# Patient Record
Sex: MALE | Race: WHITE | Employment: OTHER | URBAN - METROPOLITAN AREA
[De-identification: names, ages, dates, MRNs, and addresses within clinical notes are randomized per-mention and may not be internally consistent; named-entity substitution may affect disease eponyms.]

---

## 2017-01-28 DIAGNOSIS — G47.00 INSOMNIA, UNSPECIFIED TYPE: ICD-10-CM

## 2017-01-30 RX ORDER — TRAZODONE HYDROCHLORIDE 50 MG/1
TABLET ORAL
Qty: 30 TABLET | Refills: 0 | Status: SHIPPED | OUTPATIENT
Start: 2017-01-30 | End: 2017-03-06 | Stop reason: SDUPTHER

## 2017-01-31 DIAGNOSIS — F32.A DEPRESSION: ICD-10-CM

## 2017-01-31 DIAGNOSIS — R94.39 ABNORMAL STRESS ECG: ICD-10-CM

## 2017-01-31 RX ORDER — ASPIRIN 81 MG/1
TABLET ORAL
Qty: 30 TABLET | Refills: 3 | Status: SHIPPED | OUTPATIENT
Start: 2017-01-31 | End: 2017-09-18 | Stop reason: SDUPTHER

## 2017-01-31 RX ORDER — FLUOXETINE HYDROCHLORIDE 20 MG/1
CAPSULE ORAL
Qty: 30 CAPSULE | Refills: 3 | Status: SHIPPED | OUTPATIENT
Start: 2017-01-31 | End: 2017-09-18 | Stop reason: SDUPTHER

## 2017-03-16 DIAGNOSIS — E11.9 DIABETES MELLITUS TYPE 2 IN NONOBESE (HCC): ICD-10-CM

## 2017-07-06 ENCOUNTER — TELEPHONE (OUTPATIENT)
Dept: FAMILY MEDICINE CLINIC | Age: 57
End: 2017-07-06

## 2017-07-07 ENCOUNTER — TELEPHONE (OUTPATIENT)
Dept: FAMILY MEDICINE CLINIC | Age: 57
End: 2017-07-07

## 2017-09-18 ENCOUNTER — OFFICE VISIT (OUTPATIENT)
Dept: FAMILY MEDICINE CLINIC | Age: 57
End: 2017-09-18

## 2017-09-18 VITALS
WEIGHT: 154 LBS | BODY MASS INDEX: 22.1 KG/M2 | DIASTOLIC BLOOD PRESSURE: 80 MMHG | HEART RATE: 112 BPM | SYSTOLIC BLOOD PRESSURE: 130 MMHG | OXYGEN SATURATION: 98 %

## 2017-09-18 DIAGNOSIS — E11.9 DIABETES MELLITUS TYPE 2 IN NONOBESE (HCC): ICD-10-CM

## 2017-09-18 DIAGNOSIS — G47.00 INSOMNIA, UNSPECIFIED TYPE: ICD-10-CM

## 2017-09-18 DIAGNOSIS — G25.81 RLS (RESTLESS LEGS SYNDROME): ICD-10-CM

## 2017-09-18 DIAGNOSIS — R94.39 ABNORMAL STRESS ECG: ICD-10-CM

## 2017-09-18 DIAGNOSIS — F32.A DEPRESSION, UNSPECIFIED DEPRESSION TYPE: ICD-10-CM

## 2017-09-18 DIAGNOSIS — J40 BRONCHITIS: ICD-10-CM

## 2017-09-18 LAB
A/G RATIO: 1.9 (ref 1.1–2.2)
ALBUMIN SERPL-MCNC: 4.1 G/DL (ref 3.4–5)
ALP BLD-CCNC: 127 U/L (ref 40–129)
ALT SERPL-CCNC: 22 U/L (ref 10–40)
ANION GAP SERPL CALCULATED.3IONS-SCNC: 15 MMOL/L (ref 3–16)
AST SERPL-CCNC: 21 U/L (ref 15–37)
BILIRUB SERPL-MCNC: <0.2 MG/DL (ref 0–1)
BUN BLDV-MCNC: 10 MG/DL (ref 7–20)
CALCIUM SERPL-MCNC: 9.5 MG/DL (ref 8.3–10.6)
CHLORIDE BLD-SCNC: 100 MMOL/L (ref 99–110)
CHOLESTEROL, TOTAL: 143 MG/DL (ref 0–199)
CO2: 23 MMOL/L (ref 21–32)
CREAT SERPL-MCNC: 0.7 MG/DL (ref 0.9–1.3)
CREATININE URINE: 158.5 MG/DL (ref 39–259)
GFR AFRICAN AMERICAN: >60
GFR NON-AFRICAN AMERICAN: >60
GLOBULIN: 2.2 G/DL
GLUCOSE BLD-MCNC: 116 MG/DL (ref 70–99)
HDLC SERPL-MCNC: 23 MG/DL (ref 40–60)
LDL CHOLESTEROL CALCULATED: 62 MG/DL
MICROALBUMIN UR-MCNC: <1.2 MG/DL
MICROALBUMIN/CREAT UR-RTO: NORMAL MG/G (ref 0–30)
POTASSIUM SERPL-SCNC: 3.8 MMOL/L (ref 3.5–5.1)
SODIUM BLD-SCNC: 138 MMOL/L (ref 136–145)
TOTAL PROTEIN: 6.3 G/DL (ref 6.4–8.2)
TRIGL SERPL-MCNC: 290 MG/DL (ref 0–150)
VLDLC SERPL CALC-MCNC: 58 MG/DL

## 2017-09-18 PROCEDURE — 99214 OFFICE O/P EST MOD 30 MIN: CPT | Performed by: FAMILY MEDICINE

## 2017-09-18 PROCEDURE — 36415 COLL VENOUS BLD VENIPUNCTURE: CPT | Performed by: FAMILY MEDICINE

## 2017-09-18 RX ORDER — ASPIRIN 81 MG/1
TABLET ORAL
Qty: 30 TABLET | Refills: 3 | Status: SHIPPED | OUTPATIENT
Start: 2017-09-18 | End: 2018-01-13 | Stop reason: SDUPTHER

## 2017-09-18 RX ORDER — GABAPENTIN 300 MG/1
CAPSULE ORAL
Qty: 90 CAPSULE | Refills: 3 | Status: SHIPPED | OUTPATIENT
Start: 2017-09-18 | End: 2018-01-13 | Stop reason: SDUPTHER

## 2017-09-18 RX ORDER — ALBUTEROL SULFATE 90 UG/1
2 AEROSOL, METERED RESPIRATORY (INHALATION) EVERY 6 HOURS PRN
Qty: 1 INHALER | Refills: 3 | Status: SHIPPED | OUTPATIENT
Start: 2017-09-18 | End: 2019-02-27

## 2017-09-18 RX ORDER — FLUOXETINE HYDROCHLORIDE 20 MG/1
CAPSULE ORAL
Qty: 30 CAPSULE | Refills: 3 | Status: SHIPPED | OUTPATIENT
Start: 2017-09-18 | End: 2018-01-13 | Stop reason: SDUPTHER

## 2017-09-18 RX ORDER — TRAZODONE HYDROCHLORIDE 50 MG/1
TABLET ORAL
Qty: 30 TABLET | Refills: 3 | Status: SHIPPED | OUTPATIENT
Start: 2017-09-18 | End: 2018-01-13 | Stop reason: SDUPTHER

## 2017-09-19 LAB
ESTIMATED AVERAGE GLUCOSE: 128.4 MG/DL
HBA1C MFR BLD: 6.1 %

## 2017-10-17 ENCOUNTER — TELEPHONE (OUTPATIENT)
Dept: FAMILY MEDICINE CLINIC | Age: 57
End: 2017-10-17

## 2017-10-17 NOTE — TELEPHONE ENCOUNTER
Pt faxed over a request for a new rx stating that the pt wants to switch to something other than metformin as this is causing him to have diarrhea.  Please advise

## 2017-10-24 ENCOUNTER — TELEPHONE (OUTPATIENT)
Dept: FAMILY MEDICINE CLINIC | Age: 57
End: 2017-10-24

## 2017-10-26 NOTE — TELEPHONE ENCOUNTER
Left another message for patient to call and move up his apt from 12/8/17 to an earlier date. Patient has been left many messages, but he may just be wanting to keep that apt. It's now up to the patient to call and make apt.

## 2017-10-30 ENCOUNTER — OFFICE VISIT (OUTPATIENT)
Dept: FAMILY MEDICINE CLINIC | Age: 57
End: 2017-10-30

## 2017-10-30 VITALS
OXYGEN SATURATION: 99 % | HEART RATE: 94 BPM | WEIGHT: 155 LBS | DIASTOLIC BLOOD PRESSURE: 64 MMHG | BODY MASS INDEX: 22.24 KG/M2 | SYSTOLIC BLOOD PRESSURE: 118 MMHG

## 2017-10-30 DIAGNOSIS — R06.02 SOB (SHORTNESS OF BREATH): Primary | ICD-10-CM

## 2017-10-30 DIAGNOSIS — F17.200 SMOKER: ICD-10-CM

## 2017-10-30 PROCEDURE — 3017F COLORECTAL CA SCREEN DOC REV: CPT | Performed by: FAMILY MEDICINE

## 2017-10-30 PROCEDURE — 99214 OFFICE O/P EST MOD 30 MIN: CPT | Performed by: FAMILY MEDICINE

## 2017-10-30 PROCEDURE — G8420 CALC BMI NORM PARAMETERS: HCPCS | Performed by: FAMILY MEDICINE

## 2017-10-30 PROCEDURE — G8427 DOCREV CUR MEDS BY ELIG CLIN: HCPCS | Performed by: FAMILY MEDICINE

## 2017-10-30 PROCEDURE — G8484 FLU IMMUNIZE NO ADMIN: HCPCS | Performed by: FAMILY MEDICINE

## 2017-10-30 PROCEDURE — 4004F PT TOBACCO SCREEN RCVD TLK: CPT | Performed by: FAMILY MEDICINE

## 2017-10-30 ASSESSMENT — ENCOUNTER SYMPTOMS
COUGH: 1
SHORTNESS OF BREATH: 1

## 2017-10-30 NOTE — PROGRESS NOTES
(shortness of breath)    - FULL PFT STUDY WITH PRE AND POST; Future  - umeclidinium-vilanterol (ANORO ELLIPTA) 62.5-25 MCG/INH AEPB inhaler; Inhale 1 puff into the lungs daily  Dispense: 1 each; Refill: 5    2. Smoker    - FULL PFT STUDY WITH PRE AND POST; Future  - umeclidinium-vilanterol (ANORO ELLIPTA) 62.5-25 MCG/INH AEPB inhaler; Inhale 1 puff into the lungs daily  Dispense: 1 each; Refill: 5    Still strongly suspect early COPD by the diagnosis has not been made by criteria at this time. He'll start on ANORO and get a another pulmonary function tests.

## 2017-11-21 ENCOUNTER — HOSPITAL ENCOUNTER (OUTPATIENT)
Dept: PULMONOLOGY | Age: 57
Discharge: HOME | End: 2017-11-21
Attending: FAMILY MEDICINE | Admitting: FAMILY MEDICINE

## 2017-11-21 VITALS — OXYGEN SATURATION: 99 %

## 2017-11-21 DIAGNOSIS — R06.02 SHORTNESS OF BREATH: ICD-10-CM

## 2017-11-21 RX ORDER — ALBUTEROL SULFATE 2.5 MG/3ML
2.5 SOLUTION RESPIRATORY (INHALATION) ONCE
Status: COMPLETED | OUTPATIENT
Start: 2017-11-21 | End: 2017-11-21

## 2017-11-21 RX ADMIN — ALBUTEROL SULFATE 2.5 MG: 2.5 SOLUTION RESPIRATORY (INHALATION) at 09:14

## 2017-11-22 NOTE — PROCEDURES
[address]                  [address] [place] [address]                                PULMONARY FUNCTION    PATIENT NAME: Sudhir White                     :        1960  MED REC NO:   [id number]                          ROOM:  ACCOUNT NO:   [de-identified]                          ADMIT DATE: 2017  PROVIDER:     [facility] MD    DATE OF PROCEDURE:  2017    INDICATION:  Shortness of breath. FINDINGS:  1. Spirometry: The FEV1 is 3.41 L, which is 111% predicted. The FEV1/FVC  ratio is normal.  Inhaled bronchodilators are given. There is no  significant improvement. 2.  Lung volumes: Total lung capacity is supranormal at 7.44 L or 122%  predicted. Air trapping is present. 3.  Diffusing capacity:  DLCO is 21.38 mL/min/mmHg, which is 80% predicted. 4.  Flow volume loop is relatively normal.    IMPRESSION:  No obstructive lung defect. However, air trapping capacity is  present and diffusing capacity is at the lowest limit of normal.  Emphysema  should be in the differential diagnosis.         Graciela Nicole MD    D: 2017 16:19:28       T: 2017 0:46:25     [id number]  Job#: [id number]     Doc#: [id number]

## 2017-12-18 ENCOUNTER — OFFICE VISIT (OUTPATIENT)
Dept: FAMILY MEDICINE CLINIC | Age: 57
End: 2017-12-18

## 2017-12-18 VITALS
OXYGEN SATURATION: 97 % | DIASTOLIC BLOOD PRESSURE: 62 MMHG | WEIGHT: 154 LBS | BODY MASS INDEX: 22.1 KG/M2 | HEART RATE: 78 BPM | SYSTOLIC BLOOD PRESSURE: 110 MMHG

## 2017-12-18 DIAGNOSIS — E11.9 TYPE 2 DIABETES MELLITUS WITHOUT COMPLICATION, WITHOUT LONG-TERM CURRENT USE OF INSULIN (HCC): Primary | ICD-10-CM

## 2017-12-18 DIAGNOSIS — F17.200 SMOKER: ICD-10-CM

## 2017-12-18 PROCEDURE — G8427 DOCREV CUR MEDS BY ELIG CLIN: HCPCS | Performed by: FAMILY MEDICINE

## 2017-12-18 PROCEDURE — 4004F PT TOBACCO SCREEN RCVD TLK: CPT | Performed by: FAMILY MEDICINE

## 2017-12-18 PROCEDURE — G8484 FLU IMMUNIZE NO ADMIN: HCPCS | Performed by: FAMILY MEDICINE

## 2017-12-18 PROCEDURE — 3044F HG A1C LEVEL LT 7.0%: CPT | Performed by: FAMILY MEDICINE

## 2017-12-18 PROCEDURE — G8420 CALC BMI NORM PARAMETERS: HCPCS | Performed by: FAMILY MEDICINE

## 2017-12-18 PROCEDURE — 3017F COLORECTAL CA SCREEN DOC REV: CPT | Performed by: FAMILY MEDICINE

## 2017-12-18 PROCEDURE — 99214 OFFICE O/P EST MOD 30 MIN: CPT | Performed by: FAMILY MEDICINE

## 2017-12-21 NOTE — PROGRESS NOTES
Subjective:      Patient ID: Etienne Conrad is a 59 y.o. male. Etienne Conrad is a 59 y.o. male. Patient presents with:  3 Month Follow-Up: lung test results. Diabetes: Metformin is still causing diarrhea still happening with the decrease dose. 57-year-old male who has a significant history of shortness of breath. Recently did PFTs and these were normal. Patient has had a stress test in the past and this was normal. Patient still has shortness of breath with activity and exertion. He is also complaining that metformin continues to cause him diarrhea. He states that he is trying to watch his diet and doing well with this. His A1c's have been fairly well controlled. He denies any significant issues with numbness or tingling in his extremities no vision impairment. The patients PMH, surgical history, family history, medications, allergies were all reviewed and updated as appropriate today. Diabetes   He presents for his follow-up diabetic visit. He has type 2 diabetes mellitus. MedicAlert identification noted. His disease course has been stable. There are no diabetic associated symptoms. There are no hypoglycemic complications. Symptoms are stable. There are no diabetic complications. Risk factors for coronary artery disease include diabetes mellitus, male sex and tobacco exposure. Current diabetic treatment includes oral agent (monotherapy). He is compliant with treatment all of the time. His weight is fluctuating minimally. He is following a diabetic diet. He has had a previous visit with a dietitian. He rarely participates in exercise. His home blood glucose trend is fluctuating minimally. An ACE inhibitor/angiotensin II receptor blocker is not being taken. He does not see a podiatrist.Eye exam is current. Review of Systems    Objective:   Physical Exam   Constitutional: He is oriented to person, place, and time. He appears well-developed and well-nourished.    HENT:   Head: Normocephalic and

## 2017-12-26 ENCOUNTER — TELEPHONE (OUTPATIENT)
Dept: FAMILY MEDICINE CLINIC | Age: 57
End: 2017-12-26

## 2017-12-27 ENCOUNTER — TELEPHONE (OUTPATIENT)
Dept: FAMILY MEDICINE CLINIC | Age: 57
End: 2017-12-27

## 2017-12-27 NOTE — TELEPHONE ENCOUNTER
[address] with [facility] called to let Dr. Javon Freed know that the prescription for the Anoro-Ellipta combo was filled only as the one medication last month. He wanted to let Dr. Javon Freed know that the prescription has been corrected and patient will be getting the Anoro-Ellipta this month.

## 2018-01-10 ENCOUNTER — TELEPHONE (OUTPATIENT)
Dept: FAMILY MEDICINE CLINIC | Age: 58
End: 2018-01-10

## 2018-01-10 NOTE — TELEPHONE ENCOUNTER
Attempted to contact patient on 1/10/2018. Result: left message on the patient's voicemail asking patient to return my call. Pre-Visit planning not completed.

## 2018-01-12 NOTE — TELEPHONE ENCOUNTER
Attempted to contact patient on 1/12/2018. Result: left message on the patient's voicemail asking patient to return my call. Pre-Visit planning not completed.

## 2018-01-13 DIAGNOSIS — R94.39 ABNORMAL STRESS ECG: ICD-10-CM

## 2018-01-13 DIAGNOSIS — F32.A DEPRESSION, UNSPECIFIED DEPRESSION TYPE: ICD-10-CM

## 2018-01-13 DIAGNOSIS — G25.81 RLS (RESTLESS LEGS SYNDROME): ICD-10-CM

## 2018-01-13 DIAGNOSIS — G47.00 INSOMNIA, UNSPECIFIED TYPE: ICD-10-CM

## 2018-01-15 RX ORDER — SALICYLIC ACID 40 %
ADHESIVE PATCH, MEDICATED TOPICAL
Qty: 30 TABLET | Refills: 3 | Status: SHIPPED | OUTPATIENT
Start: 2018-01-15 | End: 2019-02-27

## 2018-01-15 RX ORDER — GABAPENTIN 300 MG/1
CAPSULE ORAL
Qty: 90 CAPSULE | Refills: 3 | Status: SHIPPED | OUTPATIENT
Start: 2018-01-15 | End: 2019-03-01 | Stop reason: ALTCHOICE

## 2018-01-15 RX ORDER — TRAZODONE HYDROCHLORIDE 50 MG/1
TABLET ORAL
Qty: 30 TABLET | Refills: 3 | Status: SHIPPED | OUTPATIENT
Start: 2018-01-15 | End: 2019-02-27

## 2018-01-15 RX ORDER — FLUOXETINE HYDROCHLORIDE 20 MG/1
CAPSULE ORAL
Qty: 30 CAPSULE | Refills: 3 | Status: SHIPPED | OUTPATIENT
Start: 2018-01-15 | End: 2019-02-27

## 2019-02-27 ENCOUNTER — APPOINTMENT (OUTPATIENT)
Dept: GENERAL RADIOLOGY | Age: 59
End: 2019-02-27
Payer: COMMERCIAL

## 2019-02-27 ENCOUNTER — HOSPITAL ENCOUNTER (OUTPATIENT)
Age: 59
Setting detail: OBSERVATION
Discharge: LEFT BEFORE DISCHARGE | End: 2019-02-28
Attending: EMERGENCY MEDICINE
Payer: COMMERCIAL

## 2019-02-27 DIAGNOSIS — R07.9 CHEST PAIN, UNSPECIFIED TYPE: Primary | ICD-10-CM

## 2019-02-27 LAB
A/G RATIO: 1.8 (ref 1.1–2.2)
ALBUMIN SERPL-MCNC: 4.2 G/DL (ref 3.4–5)
ALP BLD-CCNC: 106 U/L (ref 40–129)
ALT SERPL-CCNC: 20 U/L (ref 10–40)
ANION GAP SERPL CALCULATED.3IONS-SCNC: 10 MMOL/L (ref 3–16)
AST SERPL-CCNC: 18 U/L (ref 15–37)
BASOPHILS ABSOLUTE: 0 K/UL (ref 0–0.2)
BASOPHILS RELATIVE PERCENT: 0.6 %
BILIRUB SERPL-MCNC: 0.4 MG/DL (ref 0–1)
BUN BLDV-MCNC: 11 MG/DL (ref 7–20)
CALCIUM SERPL-MCNC: 9.3 MG/DL (ref 8.3–10.6)
CHLORIDE BLD-SCNC: 98 MMOL/L (ref 99–110)
CO2: 26 MMOL/L (ref 21–32)
CREAT SERPL-MCNC: 0.7 MG/DL (ref 0.8–1.3)
EOSINOPHILS ABSOLUTE: 0.3 K/UL (ref 0–0.6)
EOSINOPHILS RELATIVE PERCENT: 4.6 %
GFR AFRICAN AMERICAN: >60
GFR NON-AFRICAN AMERICAN: >60
GLOBULIN: 2.4 G/DL
GLUCOSE BLD-MCNC: 219 MG/DL (ref 70–99)
HCT VFR BLD CALC: 31.4 % (ref 40.5–52.5)
HEMOGLOBIN: 11.3 G/DL (ref 13.5–17.5)
LYMPHOCYTES ABSOLUTE: 2.4 K/UL (ref 1–5.1)
LYMPHOCYTES RELATIVE PERCENT: 38.8 %
MCH RBC QN AUTO: 38.3 PG (ref 26–34)
MCHC RBC AUTO-ENTMCNC: 35.9 G/DL (ref 31–36)
MCV RBC AUTO: 106.8 FL (ref 80–100)
MONOCYTES ABSOLUTE: 0.1 K/UL (ref 0–1.3)
MONOCYTES RELATIVE PERCENT: 2.1 %
NEUTROPHILS ABSOLUTE: 3.4 K/UL (ref 1.7–7.7)
NEUTROPHILS RELATIVE PERCENT: 53.9 %
PDW BLD-RTO: 18.6 % (ref 12.4–15.4)
PLATELET # BLD: 124 K/UL (ref 135–450)
PMV BLD AUTO: 7.5 FL (ref 5–10.5)
POTASSIUM SERPL-SCNC: 3.7 MMOL/L (ref 3.5–5.1)
RBC # BLD: 2.94 M/UL (ref 4.2–5.9)
SODIUM BLD-SCNC: 134 MMOL/L (ref 136–145)
TOTAL PROTEIN: 6.6 G/DL (ref 6.4–8.2)
TROPONIN: <0.01 NG/ML
WBC # BLD: 6.2 K/UL (ref 4–11)

## 2019-02-27 PROCEDURE — 2580000003 HC RX 258: Performed by: NURSE PRACTITIONER

## 2019-02-27 PROCEDURE — 71045 X-RAY EXAM CHEST 1 VIEW: CPT

## 2019-02-27 PROCEDURE — 96374 THER/PROPH/DIAG INJ IV PUSH: CPT

## 2019-02-27 PROCEDURE — 85025 COMPLETE CBC W/AUTO DIFF WBC: CPT

## 2019-02-27 PROCEDURE — 6370000000 HC RX 637 (ALT 250 FOR IP): Performed by: NURSE PRACTITIONER

## 2019-02-27 PROCEDURE — 80053 COMPREHEN METABOLIC PANEL: CPT

## 2019-02-27 PROCEDURE — 96361 HYDRATE IV INFUSION ADD-ON: CPT

## 2019-02-27 PROCEDURE — 6360000002 HC RX W HCPCS: Performed by: NURSE PRACTITIONER

## 2019-02-27 PROCEDURE — 2500000003 HC RX 250 WO HCPCS: Performed by: NURSE PRACTITIONER

## 2019-02-27 PROCEDURE — 96375 TX/PRO/DX INJ NEW DRUG ADDON: CPT

## 2019-02-27 PROCEDURE — 99285 EMERGENCY DEPT VISIT HI MDM: CPT

## 2019-02-27 PROCEDURE — 84484 ASSAY OF TROPONIN QUANT: CPT

## 2019-02-27 PROCEDURE — 93005 ELECTROCARDIOGRAM TRACING: CPT | Performed by: EMERGENCY MEDICINE

## 2019-02-27 PROCEDURE — 83690 ASSAY OF LIPASE: CPT

## 2019-02-27 RX ORDER — NITROGLYCERIN 0.4 MG/1
0.4 TABLET SUBLINGUAL EVERY 5 MIN PRN
Status: DISCONTINUED | OUTPATIENT
Start: 2019-02-27 | End: 2019-02-28 | Stop reason: HOSPADM

## 2019-02-27 RX ORDER — ONDANSETRON 2 MG/ML
4 INJECTION INTRAMUSCULAR; INTRAVENOUS ONCE
Status: COMPLETED | OUTPATIENT
Start: 2019-02-28 | End: 2019-02-27

## 2019-02-27 RX ORDER — ASPIRIN 325 MG
325 TABLET ORAL ONCE
Status: COMPLETED | OUTPATIENT
Start: 2019-02-27 | End: 2019-02-27

## 2019-02-27 RX ORDER — 0.9 % SODIUM CHLORIDE 0.9 %
1000 INTRAVENOUS SOLUTION INTRAVENOUS ONCE
Status: COMPLETED | OUTPATIENT
Start: 2019-02-27 | End: 2019-02-28

## 2019-02-27 RX ADMIN — NITROGLYCERIN 0.4 MG: 0.4 TABLET SUBLINGUAL at 23:22

## 2019-02-27 RX ADMIN — NITROGLYCERIN 0.4 MG: 0.4 TABLET SUBLINGUAL at 23:17

## 2019-02-27 RX ADMIN — NITROGLYCERIN 0.4 MG: 0.4 TABLET SUBLINGUAL at 23:28

## 2019-02-27 RX ADMIN — ASPIRIN 325 MG: 325 TABLET ORAL at 23:16

## 2019-02-27 RX ADMIN — SODIUM CHLORIDE 1000 ML: 9 INJECTION, SOLUTION INTRAVENOUS at 23:39

## 2019-02-27 RX ADMIN — NITROGLYCERIN 1 INCH: 20 OINTMENT TOPICAL at 23:39

## 2019-02-27 RX ADMIN — LIDOCAINE HYDROCHLORIDE: 20 SOLUTION ORAL; TOPICAL at 23:28

## 2019-02-27 RX ADMIN — FAMOTIDINE 20 MG: 10 INJECTION, SOLUTION INTRAVENOUS at 23:28

## 2019-02-27 RX ADMIN — ONDANSETRON 4 MG: 2 INJECTION INTRAMUSCULAR; INTRAVENOUS at 23:49

## 2019-02-27 ASSESSMENT — PAIN DESCRIPTION - ORIENTATION
ORIENTATION: MID

## 2019-02-27 ASSESSMENT — PAIN DESCRIPTION - LOCATION
LOCATION: CHEST

## 2019-02-27 ASSESSMENT — PAIN SCALES - GENERAL
PAINLEVEL_OUTOF10: 1
PAINLEVEL_OUTOF10: 4
PAINLEVEL_OUTOF10: 3
PAINLEVEL_OUTOF10: 6

## 2019-02-27 ASSESSMENT — PAIN DESCRIPTION - DESCRIPTORS
DESCRIPTORS: ACHING
DESCRIPTORS: ACHING
DESCRIPTORS: SHARP

## 2019-02-27 ASSESSMENT — HEART SCORE: ECG: 1

## 2019-02-27 ASSESSMENT — PAIN DESCRIPTION - FREQUENCY
FREQUENCY: CONTINUOUS

## 2019-02-27 ASSESSMENT — PAIN DESCRIPTION - PAIN TYPE
TYPE: ACUTE PAIN

## 2019-02-27 ASSESSMENT — ENCOUNTER SYMPTOMS
ABDOMINAL PAIN: 1
SHORTNESS OF BREATH: 0
NAUSEA: 1

## 2019-02-28 ENCOUNTER — APPOINTMENT (OUTPATIENT)
Dept: CT IMAGING | Age: 59
End: 2019-02-28
Payer: COMMERCIAL

## 2019-02-28 VITALS
SYSTOLIC BLOOD PRESSURE: 108 MMHG | WEIGHT: 160 LBS | TEMPERATURE: 96.8 F | RESPIRATION RATE: 20 BRPM | HEIGHT: 69 IN | HEART RATE: 73 BPM | DIASTOLIC BLOOD PRESSURE: 60 MMHG | OXYGEN SATURATION: 100 % | BODY MASS INDEX: 23.7 KG/M2

## 2019-02-28 LAB
EKG ATRIAL RATE: 75 BPM
EKG DIAGNOSIS: NORMAL
EKG P AXIS: 70 DEGREES
EKG P-R INTERVAL: 172 MS
EKG Q-T INTERVAL: 424 MS
EKG QRS DURATION: 140 MS
EKG QTC CALCULATION (BAZETT): 473 MS
EKG R AXIS: -31 DEGREES
EKG T AXIS: 82 DEGREES
EKG VENTRICULAR RATE: 75 BPM
LIPASE: 27 U/L (ref 13–60)

## 2019-02-28 PROCEDURE — 6360000002 HC RX W HCPCS: Performed by: NURSE PRACTITIONER

## 2019-02-28 PROCEDURE — 74177 CT ABD & PELVIS W/CONTRAST: CPT

## 2019-02-28 PROCEDURE — 93010 ELECTROCARDIOGRAM REPORT: CPT | Performed by: INTERNAL MEDICINE

## 2019-02-28 PROCEDURE — 96375 TX/PRO/DX INJ NEW DRUG ADDON: CPT

## 2019-02-28 PROCEDURE — G0378 HOSPITAL OBSERVATION PER HR: HCPCS

## 2019-02-28 PROCEDURE — 6360000004 HC RX CONTRAST MEDICATION: Performed by: EMERGENCY MEDICINE

## 2019-02-28 PROCEDURE — 74160 CT ABDOMEN W/CONTRAST: CPT

## 2019-02-28 RX ORDER — DEXTROSE MONOHYDRATE 25 G/50ML
12.5 INJECTION, SOLUTION INTRAVENOUS PRN
Status: CANCELLED | OUTPATIENT
Start: 2019-02-28

## 2019-02-28 RX ORDER — SODIUM CHLORIDE 0.9 % (FLUSH) 0.9 %
10 SYRINGE (ML) INJECTION PRN
Status: CANCELLED | OUTPATIENT
Start: 2019-02-28

## 2019-02-28 RX ORDER — SODIUM CHLORIDE 0.9 % (FLUSH) 0.9 %
10 SYRINGE (ML) INJECTION EVERY 12 HOURS SCHEDULED
Status: CANCELLED | OUTPATIENT
Start: 2019-02-28

## 2019-02-28 RX ORDER — ONDANSETRON 2 MG/ML
4 INJECTION INTRAMUSCULAR; INTRAVENOUS EVERY 6 HOURS PRN
Status: CANCELLED | OUTPATIENT
Start: 2019-02-28

## 2019-02-28 RX ORDER — ATORVASTATIN CALCIUM 10 MG/1
40 TABLET, FILM COATED ORAL NIGHTLY
Status: CANCELLED | OUTPATIENT
Start: 2019-02-28

## 2019-02-28 RX ORDER — MORPHINE SULFATE 4 MG/ML
4 INJECTION, SOLUTION INTRAMUSCULAR; INTRAVENOUS ONCE
Status: COMPLETED | OUTPATIENT
Start: 2019-02-28 | End: 2019-02-28

## 2019-02-28 RX ORDER — MORPHINE SULFATE 4 MG/ML
2 INJECTION, SOLUTION INTRAMUSCULAR; INTRAVENOUS EVERY 4 HOURS PRN
Status: CANCELLED | OUTPATIENT
Start: 2019-02-28

## 2019-02-28 RX ORDER — ASPIRIN 81 MG/1
81 TABLET, CHEWABLE ORAL DAILY
Status: CANCELLED | OUTPATIENT
Start: 2019-03-01

## 2019-02-28 RX ORDER — DEXTROSE MONOHYDRATE 50 MG/ML
100 INJECTION, SOLUTION INTRAVENOUS PRN
Status: CANCELLED | OUTPATIENT
Start: 2019-02-28

## 2019-02-28 RX ORDER — NICOTINE POLACRILEX 4 MG
15 LOZENGE BUCCAL PRN
Status: CANCELLED | OUTPATIENT
Start: 2019-02-28

## 2019-02-28 RX ADMIN — MORPHINE SULFATE 4 MG: 4 INJECTION INTRAVENOUS at 00:15

## 2019-02-28 RX ADMIN — IOPAMIDOL 75 ML: 755 INJECTION, SOLUTION INTRAVENOUS at 00:45

## 2019-02-28 ASSESSMENT — PAIN DESCRIPTION - PAIN TYPE
TYPE: ACUTE PAIN
TYPE: ACUTE PAIN

## 2019-02-28 ASSESSMENT — PAIN SCALES - GENERAL
PAINLEVEL_OUTOF10: 4
PAINLEVEL_OUTOF10: 4
PAINLEVEL_OUTOF10: 3

## 2019-02-28 ASSESSMENT — PAIN DESCRIPTION - LOCATION
LOCATION: CHEST
LOCATION: CHEST

## 2019-03-01 ENCOUNTER — HOSPITAL ENCOUNTER (EMERGENCY)
Age: 59
Discharge: HOME | End: 2019-03-01
Payer: COMMERCIAL

## 2019-03-01 ENCOUNTER — OFFICE VISIT (OUTPATIENT)
Dept: FAMILY MEDICINE CLINIC | Age: 59
End: 2019-03-01
Payer: COMMERCIAL

## 2019-03-01 ENCOUNTER — APPOINTMENT (OUTPATIENT)
Dept: ULTRASOUND IMAGING | Age: 59
End: 2019-03-01
Payer: COMMERCIAL

## 2019-03-01 VITALS
OXYGEN SATURATION: 98 % | TEMPERATURE: 98.6 F | HEART RATE: 83 BPM | HEIGHT: 69 IN | DIASTOLIC BLOOD PRESSURE: 83 MMHG | RESPIRATION RATE: 14 BRPM | SYSTOLIC BLOOD PRESSURE: 113 MMHG | BODY MASS INDEX: 22.81 KG/M2 | WEIGHT: 154 LBS

## 2019-03-01 VITALS
DIASTOLIC BLOOD PRESSURE: 64 MMHG | TEMPERATURE: 98.4 F | SYSTOLIC BLOOD PRESSURE: 130 MMHG | WEIGHT: 154 LBS | OXYGEN SATURATION: 94 % | HEART RATE: 98 BPM | BODY MASS INDEX: 22.74 KG/M2

## 2019-03-01 DIAGNOSIS — R10.11 ABDOMINAL PAIN, RIGHT UPPER QUADRANT: Primary | ICD-10-CM

## 2019-03-01 DIAGNOSIS — K76.0 HEPATIC STEATOSIS: ICD-10-CM

## 2019-03-01 DIAGNOSIS — K80.20 CALCULUS OF GALLBLADDER WITHOUT CHOLECYSTITIS WITHOUT OBSTRUCTION: ICD-10-CM

## 2019-03-01 DIAGNOSIS — R19.8 ABDOMINAL COMPLAINTS: Primary | ICD-10-CM

## 2019-03-01 DIAGNOSIS — F17.200 SMOKER: ICD-10-CM

## 2019-03-01 LAB
A/G RATIO: 1.6 (ref 1.1–2.2)
ALBUMIN SERPL-MCNC: 4.1 G/DL (ref 3.4–5)
ALP BLD-CCNC: 107 U/L (ref 40–129)
ALT SERPL-CCNC: 24 U/L (ref 10–40)
ANION GAP SERPL CALCULATED.3IONS-SCNC: 11 MMOL/L (ref 3–16)
AST SERPL-CCNC: 15 U/L (ref 15–37)
BACTERIA: ABNORMAL /HPF
BASOPHILS ABSOLUTE: 0 K/UL (ref 0–0.2)
BASOPHILS RELATIVE PERCENT: 0.2 %
BILIRUB SERPL-MCNC: 0.5 MG/DL (ref 0–1)
BILIRUBIN URINE: NEGATIVE
BLOOD, URINE: ABNORMAL
BUN BLDV-MCNC: 11 MG/DL (ref 7–20)
CALCIUM SERPL-MCNC: 8.8 MG/DL (ref 8.3–10.6)
CHLORIDE BLD-SCNC: 94 MMOL/L (ref 99–110)
CLARITY: CLEAR
CO2: 26 MMOL/L (ref 21–32)
COLOR: YELLOW
CREAT SERPL-MCNC: 0.6 MG/DL (ref 0.8–1.3)
EOSINOPHILS ABSOLUTE: 0.1 K/UL (ref 0–0.6)
EOSINOPHILS RELATIVE PERCENT: 1.1 %
ETHANOL: NORMAL MG/DL (ref 0–0.08)
GFR AFRICAN AMERICAN: >60
GFR NON-AFRICAN AMERICAN: >60
GLOBULIN: 2.5 G/DL
GLUCOSE BLD-MCNC: 144 MG/DL (ref 70–99)
GLUCOSE URINE: NEGATIVE MG/DL
HCT VFR BLD CALC: 32.6 % (ref 40.5–52.5)
HEMOGLOBIN: 11.3 G/DL (ref 13.5–17.5)
KETONES, URINE: ABNORMAL MG/DL
LEUKOCYTE ESTERASE, URINE: NEGATIVE
LIPASE: 18 U/L (ref 13–60)
LYMPHOCYTES ABSOLUTE: 2.6 K/UL (ref 1–5.1)
LYMPHOCYTES RELATIVE PERCENT: 35.3 %
MCH RBC QN AUTO: 37.2 PG (ref 26–34)
MCHC RBC AUTO-ENTMCNC: 34.7 G/DL (ref 31–36)
MCV RBC AUTO: 107.2 FL (ref 80–100)
MICROSCOPIC EXAMINATION: YES
MONOCYTES ABSOLUTE: 0.1 K/UL (ref 0–1.3)
MONOCYTES RELATIVE PERCENT: 1.8 %
MUCUS: ABNORMAL /LPF
NEUTROPHILS ABSOLUTE: 4.5 K/UL (ref 1.7–7.7)
NEUTROPHILS RELATIVE PERCENT: 61.6 %
NITRITE, URINE: NEGATIVE
PDW BLD-RTO: 19 % (ref 12.4–15.4)
PH UA: 6.5
PLATELET # BLD: 136 K/UL (ref 135–450)
PMV BLD AUTO: 7.1 FL (ref 5–10.5)
POTASSIUM SERPL-SCNC: 3.7 MMOL/L (ref 3.5–5.1)
PROTEIN UA: NEGATIVE MG/DL
RBC # BLD: 3.04 M/UL (ref 4.2–5.9)
RBC UA: ABNORMAL /HPF (ref 0–2)
SODIUM BLD-SCNC: 131 MMOL/L (ref 136–145)
SPECIFIC GRAVITY UA: 1.02
TOTAL PROTEIN: 6.6 G/DL (ref 6.4–8.2)
URINE TYPE: ABNORMAL
UROBILINOGEN, URINE: 0.2 E.U./DL
WBC # BLD: 7.3 K/UL (ref 4–11)
WBC UA: ABNORMAL /HPF (ref 0–5)

## 2019-03-01 PROCEDURE — 36415 COLL VENOUS BLD VENIPUNCTURE: CPT

## 2019-03-01 PROCEDURE — 99284 EMERGENCY DEPT VISIT MOD MDM: CPT

## 2019-03-01 PROCEDURE — 76705 ECHO EXAM OF ABDOMEN: CPT

## 2019-03-01 PROCEDURE — 81001 URINALYSIS AUTO W/SCOPE: CPT

## 2019-03-01 PROCEDURE — 80053 COMPREHEN METABOLIC PANEL: CPT

## 2019-03-01 PROCEDURE — 83690 ASSAY OF LIPASE: CPT

## 2019-03-01 PROCEDURE — 85025 COMPLETE CBC W/AUTO DIFF WBC: CPT

## 2019-03-01 PROCEDURE — 99213 OFFICE O/P EST LOW 20 MIN: CPT | Performed by: NURSE PRACTITIONER

## 2019-03-01 PROCEDURE — G0480 DRUG TEST DEF 1-7 CLASSES: HCPCS

## 2019-03-01 RX ORDER — HYDROCODONE BITARTRATE AND ACETAMINOPHEN 5; 325 MG/1; MG/1
1 TABLET ORAL EVERY 6 HOURS PRN
Qty: 12 TABLET | Refills: 0 | Status: SHIPPED | OUTPATIENT
Start: 2019-03-01 | End: 2019-03-04

## 2019-03-01 RX ORDER — FAMOTIDINE 20 MG/1
20 TABLET, FILM COATED ORAL 2 TIMES DAILY
Qty: 60 TABLET | Refills: 0 | Status: SHIPPED | OUTPATIENT
Start: 2019-03-01 | End: 2021-08-17

## 2019-03-01 RX ORDER — FLUOXETINE HYDROCHLORIDE 20 MG/1
20 CAPSULE ORAL DAILY
COMMUNITY
End: 2021-08-17

## 2019-03-01 ASSESSMENT — PAIN SCALES - GENERAL
PAINLEVEL_OUTOF10: 8
PAINLEVEL_OUTOF10: 5

## 2019-03-01 ASSESSMENT — PAIN DESCRIPTION - PAIN TYPE
TYPE: ACUTE PAIN
TYPE: ACUTE PAIN

## 2019-03-01 ASSESSMENT — ENCOUNTER SYMPTOMS
COUGH: 0
SHORTNESS OF BREATH: 0
CHEST TIGHTNESS: 0
VOMITING: 1
COLOR CHANGE: 0
DIARRHEA: 0
BACK PAIN: 0
VOMITING: 0
NAUSEA: 1
CONSTIPATION: 0
ABDOMINAL PAIN: 1
EYES NEGATIVE: 1
ABDOMINAL PAIN: 1
CONSTIPATION: 0
DIARRHEA: 0

## 2019-03-01 ASSESSMENT — PAIN DESCRIPTION - ORIENTATION: ORIENTATION: MID

## 2019-03-01 ASSESSMENT — PAIN DESCRIPTION - LOCATION: LOCATION: ABDOMEN

## 2019-03-11 ENCOUNTER — INITIAL CONSULT (OUTPATIENT)
Dept: SURGERY | Age: 59
End: 2019-03-11
Payer: COMMERCIAL

## 2019-03-11 VITALS
HEIGHT: 69 IN | SYSTOLIC BLOOD PRESSURE: 128 MMHG | BODY MASS INDEX: 22.9 KG/M2 | WEIGHT: 154.6 LBS | DIASTOLIC BLOOD PRESSURE: 79 MMHG | HEART RATE: 86 BPM

## 2019-03-11 DIAGNOSIS — K80.20 SYMPTOMATIC CHOLELITHIASIS: Primary | ICD-10-CM

## 2019-03-11 PROCEDURE — 99243 OFF/OP CNSLTJ NEW/EST LOW 30: CPT | Performed by: SURGERY

## 2019-03-12 ENCOUNTER — TELEPHONE (OUTPATIENT)
Dept: SURGERY | Age: 59
End: 2019-03-12

## 2019-03-15 ENCOUNTER — OFFICE VISIT (OUTPATIENT)
Dept: FAMILY MEDICINE CLINIC | Age: 59
End: 2019-03-15
Payer: COMMERCIAL

## 2019-03-15 VITALS
WEIGHT: 152.6 LBS | BODY MASS INDEX: 22.6 KG/M2 | OXYGEN SATURATION: 98 % | HEART RATE: 89 BPM | DIASTOLIC BLOOD PRESSURE: 50 MMHG | SYSTOLIC BLOOD PRESSURE: 100 MMHG | RESPIRATION RATE: 16 BRPM | HEIGHT: 69 IN

## 2019-03-15 DIAGNOSIS — Z01.818 PRE-OP EXAM: Primary | ICD-10-CM

## 2019-03-15 DIAGNOSIS — K80.20 CALCULUS OF GALLBLADDER WITHOUT CHOLECYSTITIS WITHOUT OBSTRUCTION: ICD-10-CM

## 2019-03-15 LAB
ANION GAP SERPL CALCULATED.3IONS-SCNC: 12 MMOL/L (ref 3–16)
BASOPHILS ABSOLUTE: 0 K/UL (ref 0–0.2)
BASOPHILS RELATIVE PERCENT: 0.5 %
BUN BLDV-MCNC: 15 MG/DL (ref 7–20)
CALCIUM SERPL-MCNC: 8.7 MG/DL (ref 8.3–10.6)
CHLORIDE BLD-SCNC: 100 MMOL/L (ref 99–110)
CO2: 25 MMOL/L (ref 21–32)
CREAT SERPL-MCNC: 0.6 MG/DL (ref 0.8–1.3)
EOSINOPHILS ABSOLUTE: 0.1 K/UL (ref 0–0.6)
EOSINOPHILS RELATIVE PERCENT: 2.4 %
GFR AFRICAN AMERICAN: >60
GFR NON-AFRICAN AMERICAN: >60
GLUCOSE BLD-MCNC: 137 MG/DL (ref 70–99)
HCT VFR BLD CALC: 28 % (ref 40.5–52.5)
HEMOGLOBIN: 9.7 G/DL (ref 13.5–17.5)
LYMPHOCYTES ABSOLUTE: 1.8 K/UL (ref 1–5.1)
LYMPHOCYTES RELATIVE PERCENT: 35.3 %
MCH RBC QN AUTO: 37.8 PG (ref 26–34)
MCHC RBC AUTO-ENTMCNC: 34.8 G/DL (ref 31–36)
MCV RBC AUTO: 108.5 FL (ref 80–100)
MONOCYTES ABSOLUTE: 0.1 K/UL (ref 0–1.3)
MONOCYTES RELATIVE PERCENT: 1.7 %
NEUTROPHILS ABSOLUTE: 3.1 K/UL (ref 1.7–7.7)
NEUTROPHILS RELATIVE PERCENT: 60.1 %
PDW BLD-RTO: 21.1 % (ref 12.4–15.4)
PLATELET # BLD: 170 K/UL (ref 135–450)
PMV BLD AUTO: 7.3 FL (ref 5–10.5)
POTASSIUM SERPL-SCNC: 4.5 MMOL/L (ref 3.5–5.1)
RBC # BLD: 2.58 M/UL (ref 4.2–5.9)
SODIUM BLD-SCNC: 137 MMOL/L (ref 136–145)
WBC # BLD: 5.2 K/UL (ref 4–11)

## 2019-03-15 PROCEDURE — 99242 OFF/OP CONSLTJ NEW/EST SF 20: CPT | Performed by: NURSE PRACTITIONER

## 2019-03-15 PROCEDURE — 36415 COLL VENOUS BLD VENIPUNCTURE: CPT | Performed by: NURSE PRACTITIONER

## 2019-03-15 ASSESSMENT — PATIENT HEALTH QUESTIONNAIRE - PHQ9
SUM OF ALL RESPONSES TO PHQ QUESTIONS 1-9: 0
SUM OF ALL RESPONSES TO PHQ9 QUESTIONS 1 & 2: 0
1. LITTLE INTEREST OR PLEASURE IN DOING THINGS: 0
2. FEELING DOWN, DEPRESSED OR HOPELESS: 0
SUM OF ALL RESPONSES TO PHQ QUESTIONS 1-9: 0

## 2019-03-18 ENCOUNTER — ANESTHESIA EVENT (OUTPATIENT)
Dept: OPERATING ROOM | Age: 59
End: 2019-03-18
Payer: COMMERCIAL

## 2019-03-18 PROBLEM — K80.20 SYMPTOMATIC CHOLELITHIASIS: Status: ACTIVE | Noted: 2019-03-18

## 2019-03-18 ASSESSMENT — ENCOUNTER SYMPTOMS: SHORTNESS OF BREATH: 1

## 2019-03-19 ENCOUNTER — HOSPITAL ENCOUNTER (OUTPATIENT)
Dept: GENERAL RADIOLOGY | Age: 59
Discharge: HOME | End: 2019-03-19
Attending: SURGERY
Payer: COMMERCIAL

## 2019-03-19 ENCOUNTER — ANESTHESIA (OUTPATIENT)
Dept: OPERATING ROOM | Age: 59
End: 2019-03-19
Payer: COMMERCIAL

## 2019-03-19 ENCOUNTER — HOSPITAL ENCOUNTER (OUTPATIENT)
Age: 59
Setting detail: OUTPATIENT SURGERY
Discharge: HOME | End: 2019-03-19
Attending: SURGERY | Admitting: SURGERY
Payer: COMMERCIAL

## 2019-03-19 VITALS
OXYGEN SATURATION: 97 % | HEART RATE: 85 BPM | DIASTOLIC BLOOD PRESSURE: 68 MMHG | HEIGHT: 68 IN | BODY MASS INDEX: 22.59 KG/M2 | RESPIRATION RATE: 20 BRPM | SYSTOLIC BLOOD PRESSURE: 108 MMHG | TEMPERATURE: 97.6 F | WEIGHT: 149.06 LBS

## 2019-03-19 VITALS
RESPIRATION RATE: 4 BRPM | SYSTOLIC BLOOD PRESSURE: 134 MMHG | OXYGEN SATURATION: 100 % | DIASTOLIC BLOOD PRESSURE: 66 MMHG

## 2019-03-19 DIAGNOSIS — Z90.49 S/P LAPAROSCOPIC CHOLECYSTECTOMY: Primary | ICD-10-CM

## 2019-03-19 DIAGNOSIS — K80.20 SYMPTOMATIC CHOLELITHIASIS: ICD-10-CM

## 2019-03-19 LAB
GLUCOSE BLD-MCNC: 148 MG/DL (ref 70–99)
GLUCOSE BLD-MCNC: 178 MG/DL (ref 70–99)
PERFORMED ON: ABNORMAL
PERFORMED ON: ABNORMAL

## 2019-03-19 PROCEDURE — 7100000010 HC PHASE II RECOVERY - FIRST 15 MIN: Performed by: SURGERY

## 2019-03-19 PROCEDURE — 3600000004 HC SURGERY LEVEL 4 BASE: Performed by: SURGERY

## 2019-03-19 PROCEDURE — 6360000002 HC RX W HCPCS

## 2019-03-19 PROCEDURE — 7100000000 HC PACU RECOVERY - FIRST 15 MIN: Performed by: SURGERY

## 2019-03-19 PROCEDURE — 7100000011 HC PHASE II RECOVERY - ADDTL 15 MIN: Performed by: SURGERY

## 2019-03-19 PROCEDURE — 3600000014 HC SURGERY LEVEL 4 ADDTL 15MIN: Performed by: SURGERY

## 2019-03-19 PROCEDURE — 2709999900 HC NON-CHARGEABLE SUPPLY: Performed by: SURGERY

## 2019-03-19 PROCEDURE — 3700000001 HC ADD 15 MINUTES (ANESTHESIA): Performed by: SURGERY

## 2019-03-19 PROCEDURE — 2580000003 HC RX 258: Performed by: ANESTHESIOLOGY

## 2019-03-19 PROCEDURE — 47563 LAPARO CHOLECYSTECTOMY/GRAPH: CPT | Performed by: SURGERY

## 2019-03-19 PROCEDURE — 74300 X-RAY BILE DUCTS/PANCREAS: CPT

## 2019-03-19 PROCEDURE — 3700000000 HC ANESTHESIA ATTENDED CARE: Performed by: SURGERY

## 2019-03-19 PROCEDURE — 6360000002 HC RX W HCPCS: Performed by: NURSE ANESTHETIST, CERTIFIED REGISTERED

## 2019-03-19 PROCEDURE — 88304 TISSUE EXAM BY PATHOLOGIST: CPT

## 2019-03-19 PROCEDURE — 7100000001 HC PACU RECOVERY - ADDTL 15 MIN: Performed by: SURGERY

## 2019-03-19 PROCEDURE — 6360000004 HC RX CONTRAST MEDICATION: Performed by: SURGERY

## 2019-03-19 PROCEDURE — 2580000003 HC RX 258: Performed by: SURGERY

## 2019-03-19 PROCEDURE — 2500000003 HC RX 250 WO HCPCS: Performed by: NURSE ANESTHETIST, CERTIFIED REGISTERED

## 2019-03-19 PROCEDURE — 3209999900 FLUORO FOR SURGICAL PROCEDURES

## 2019-03-19 PROCEDURE — 2500000003 HC RX 250 WO HCPCS: Performed by: SURGERY

## 2019-03-19 RX ORDER — LABETALOL HYDROCHLORIDE 5 MG/ML
5 INJECTION, SOLUTION INTRAVENOUS EVERY 10 MIN PRN
Status: DISCONTINUED | OUTPATIENT
Start: 2019-03-19 | End: 2019-03-19 | Stop reason: HOSPADM

## 2019-03-19 RX ORDER — LIDOCAINE HYDROCHLORIDE 10 MG/ML
INJECTION, SOLUTION INFILTRATION; PERINEURAL PRN
Status: DISCONTINUED | OUTPATIENT
Start: 2019-03-19 | End: 2019-03-19 | Stop reason: SDUPTHER

## 2019-03-19 RX ORDER — FENTANYL CITRATE 50 UG/ML
INJECTION, SOLUTION INTRAMUSCULAR; INTRAVENOUS PRN
Status: DISCONTINUED | OUTPATIENT
Start: 2019-03-19 | End: 2019-03-19 | Stop reason: SDUPTHER

## 2019-03-19 RX ORDER — MORPHINE SULFATE 4 MG/ML
1 INJECTION, SOLUTION INTRAMUSCULAR; INTRAVENOUS EVERY 5 MIN PRN
Status: DISCONTINUED | OUTPATIENT
Start: 2019-03-19 | End: 2019-03-19 | Stop reason: HOSPADM

## 2019-03-19 RX ORDER — ONDANSETRON 2 MG/ML
INJECTION INTRAMUSCULAR; INTRAVENOUS PRN
Status: DISCONTINUED | OUTPATIENT
Start: 2019-03-19 | End: 2019-03-19 | Stop reason: SDUPTHER

## 2019-03-19 RX ORDER — HYDRALAZINE HYDROCHLORIDE 20 MG/ML
5 INJECTION INTRAMUSCULAR; INTRAVENOUS EVERY 10 MIN PRN
Status: DISCONTINUED | OUTPATIENT
Start: 2019-03-19 | End: 2019-03-19 | Stop reason: HOSPADM

## 2019-03-19 RX ORDER — LIDOCAINE HYDROCHLORIDE 10 MG/ML
1 INJECTION, SOLUTION EPIDURAL; INFILTRATION; INTRACAUDAL; PERINEURAL
Status: DISCONTINUED | OUTPATIENT
Start: 2019-03-19 | End: 2019-03-19 | Stop reason: HOSPADM

## 2019-03-19 RX ORDER — PROPOFOL 10 MG/ML
INJECTION, EMULSION INTRAVENOUS PRN
Status: DISCONTINUED | OUTPATIENT
Start: 2019-03-19 | End: 2019-03-19 | Stop reason: SDUPTHER

## 2019-03-19 RX ORDER — HYDROMORPHONE HCL 110MG/55ML
PATIENT CONTROLLED ANALGESIA SYRINGE INTRAVENOUS PRN
Status: DISCONTINUED | OUTPATIENT
Start: 2019-03-19 | End: 2019-03-19 | Stop reason: SDUPTHER

## 2019-03-19 RX ORDER — DEXAMETHASONE SODIUM PHOSPHATE 4 MG/ML
INJECTION, SOLUTION INTRA-ARTICULAR; INTRALESIONAL; INTRAMUSCULAR; INTRAVENOUS; SOFT TISSUE PRN
Status: DISCONTINUED | OUTPATIENT
Start: 2019-03-19 | End: 2019-03-19 | Stop reason: SDUPTHER

## 2019-03-19 RX ORDER — SODIUM CHLORIDE 0.9 % (FLUSH) 0.9 %
10 SYRINGE (ML) INJECTION EVERY 12 HOURS SCHEDULED
Status: DISCONTINUED | OUTPATIENT
Start: 2019-03-19 | End: 2019-03-19 | Stop reason: HOSPADM

## 2019-03-19 RX ORDER — SODIUM CHLORIDE, SODIUM LACTATE, POTASSIUM CHLORIDE, CALCIUM CHLORIDE 600; 310; 30; 20 MG/100ML; MG/100ML; MG/100ML; MG/100ML
INJECTION, SOLUTION INTRAVENOUS CONTINUOUS
Status: DISCONTINUED | OUTPATIENT
Start: 2019-03-19 | End: 2019-03-19 | Stop reason: HOSPADM

## 2019-03-19 RX ORDER — DIPHENHYDRAMINE HYDROCHLORIDE 50 MG/ML
12.5 INJECTION INTRAMUSCULAR; INTRAVENOUS
Status: DISCONTINUED | OUTPATIENT
Start: 2019-03-19 | End: 2019-03-19 | Stop reason: HOSPADM

## 2019-03-19 RX ORDER — MORPHINE SULFATE 4 MG/ML
2 INJECTION, SOLUTION INTRAMUSCULAR; INTRAVENOUS EVERY 5 MIN PRN
Status: DISCONTINUED | OUTPATIENT
Start: 2019-03-19 | End: 2019-03-19 | Stop reason: HOSPADM

## 2019-03-19 RX ORDER — SODIUM CHLORIDE, SODIUM LACTATE, POTASSIUM CHLORIDE, AND CALCIUM CHLORIDE .6; .31; .03; .02 G/100ML; G/100ML; G/100ML; G/100ML
IRRIGANT IRRIGATION PRN
Status: DISCONTINUED | OUTPATIENT
Start: 2019-03-19 | End: 2019-03-19 | Stop reason: ALTCHOICE

## 2019-03-19 RX ORDER — OXYCODONE HYDROCHLORIDE AND ACETAMINOPHEN 5; 325 MG/1; MG/1
1-2 TABLET ORAL EVERY 6 HOURS PRN
Qty: 20 TABLET | Refills: 0 | Status: SHIPPED | OUTPATIENT
Start: 2019-03-19 | End: 2019-03-24

## 2019-03-19 RX ORDER — BUPIVACAINE HYDROCHLORIDE AND EPINEPHRINE 5; 5 MG/ML; UG/ML
INJECTION, SOLUTION PERINEURAL PRN
Status: DISCONTINUED | OUTPATIENT
Start: 2019-03-19 | End: 2019-03-19 | Stop reason: ALTCHOICE

## 2019-03-19 RX ORDER — ONDANSETRON 2 MG/ML
4 INJECTION INTRAMUSCULAR; INTRAVENOUS PRN
Status: DISCONTINUED | OUTPATIENT
Start: 2019-03-19 | End: 2019-03-19 | Stop reason: HOSPADM

## 2019-03-19 RX ORDER — OXYCODONE HYDROCHLORIDE AND ACETAMINOPHEN 5; 325 MG/1; MG/1
2 TABLET ORAL PRN
Status: DISCONTINUED | OUTPATIENT
Start: 2019-03-19 | End: 2019-03-19 | Stop reason: HOSPADM

## 2019-03-19 RX ORDER — PROMETHAZINE HYDROCHLORIDE 25 MG/ML
6.25 INJECTION, SOLUTION INTRAMUSCULAR; INTRAVENOUS
Status: DISCONTINUED | OUTPATIENT
Start: 2019-03-19 | End: 2019-03-19 | Stop reason: HOSPADM

## 2019-03-19 RX ORDER — OXYCODONE HYDROCHLORIDE AND ACETAMINOPHEN 5; 325 MG/1; MG/1
1 TABLET ORAL PRN
Status: DISCONTINUED | OUTPATIENT
Start: 2019-03-19 | End: 2019-03-19 | Stop reason: HOSPADM

## 2019-03-19 RX ORDER — MEPERIDINE HYDROCHLORIDE 50 MG/ML
12.5 INJECTION INTRAMUSCULAR; INTRAVENOUS; SUBCUTANEOUS EVERY 5 MIN PRN
Status: DISCONTINUED | OUTPATIENT
Start: 2019-03-19 | End: 2019-03-19 | Stop reason: HOSPADM

## 2019-03-19 RX ORDER — SODIUM CHLORIDE 0.9 % (FLUSH) 0.9 %
10 SYRINGE (ML) INJECTION PRN
Status: DISCONTINUED | OUTPATIENT
Start: 2019-03-19 | End: 2019-03-19 | Stop reason: HOSPADM

## 2019-03-19 RX ORDER — ROCURONIUM BROMIDE 10 MG/ML
INJECTION, SOLUTION INTRAVENOUS PRN
Status: DISCONTINUED | OUTPATIENT
Start: 2019-03-19 | End: 2019-03-19 | Stop reason: SDUPTHER

## 2019-03-19 RX ADMIN — FENTANYL CITRATE 75 MCG: 50 INJECTION INTRAMUSCULAR; INTRAVENOUS at 10:41

## 2019-03-19 RX ADMIN — SODIUM CHLORIDE, POTASSIUM CHLORIDE, SODIUM LACTATE AND CALCIUM CHLORIDE: 600; 310; 30; 20 INJECTION, SOLUTION INTRAVENOUS at 11:25

## 2019-03-19 RX ADMIN — Medication 0.25 MG: at 09:39

## 2019-03-19 RX ADMIN — ONDANSETRON 4 MG: 2 INJECTION INTRAMUSCULAR; INTRAVENOUS at 10:55

## 2019-03-19 RX ADMIN — HYDROMORPHONE HYDROCHLORIDE 0.5 MG: 2 INJECTION INTRAMUSCULAR; INTRAVENOUS; SUBCUTANEOUS at 11:13

## 2019-03-19 RX ADMIN — HYDROMORPHONE HYDROCHLORIDE 0.25 MG: 1 INJECTION, SOLUTION INTRAMUSCULAR; INTRAVENOUS; SUBCUTANEOUS at 09:39

## 2019-03-19 RX ADMIN — FENTANYL CITRATE 25 MCG: 50 INJECTION INTRAMUSCULAR; INTRAVENOUS at 11:00

## 2019-03-19 RX ADMIN — SODIUM CHLORIDE, POTASSIUM CHLORIDE, SODIUM LACTATE AND CALCIUM CHLORIDE: 600; 310; 30; 20 INJECTION, SOLUTION INTRAVENOUS at 08:48

## 2019-03-19 RX ADMIN — SUGAMMADEX 200 MG: 100 INJECTION, SOLUTION INTRAVENOUS at 11:46

## 2019-03-19 RX ADMIN — DEXAMETHASONE SODIUM PHOSPHATE 4 MG: 4 INJECTION, SOLUTION INTRAMUSCULAR; INTRAVENOUS at 10:55

## 2019-03-19 RX ADMIN — LIDOCAINE HYDROCHLORIDE 40 MG: 10 INJECTION, SOLUTION INFILTRATION; PERINEURAL at 10:41

## 2019-03-19 RX ADMIN — HYDROMORPHONE HYDROCHLORIDE 0.5 MG: 2 INJECTION INTRAMUSCULAR; INTRAVENOUS; SUBCUTANEOUS at 11:23

## 2019-03-19 RX ADMIN — FENTANYL CITRATE 100 MCG: 50 INJECTION INTRAMUSCULAR; INTRAVENOUS at 11:05

## 2019-03-19 RX ADMIN — ROCURONIUM BROMIDE 40 MG: 10 SOLUTION INTRAVENOUS at 10:41

## 2019-03-19 RX ADMIN — PROPOFOL 130 MG: 10 INJECTION, EMULSION INTRAVENOUS at 10:41

## 2019-03-19 ASSESSMENT — PULMONARY FUNCTION TESTS
PIF_VALUE: 4
PIF_VALUE: 13
PIF_VALUE: 23
PIF_VALUE: 16
PIF_VALUE: 22
PIF_VALUE: 13
PIF_VALUE: 13
PIF_VALUE: 24
PIF_VALUE: 0
PIF_VALUE: 18
PIF_VALUE: 13
PIF_VALUE: 13
PIF_VALUE: 7
PIF_VALUE: 2
PIF_VALUE: 24
PIF_VALUE: 15
PIF_VALUE: 2
PIF_VALUE: 13
PIF_VALUE: 13
PIF_VALUE: 22
PIF_VALUE: 4
PIF_VALUE: 13
PIF_VALUE: 13
PIF_VALUE: 2
PIF_VALUE: 2
PIF_VALUE: 17
PIF_VALUE: 4
PIF_VALUE: 2
PIF_VALUE: 0
PIF_VALUE: 14
PIF_VALUE: 24
PIF_VALUE: 19
PIF_VALUE: 16
PIF_VALUE: 1
PIF_VALUE: 3
PIF_VALUE: 0
PIF_VALUE: 13
PIF_VALUE: 18
PIF_VALUE: 18
PIF_VALUE: 2
PIF_VALUE: 23
PIF_VALUE: 15
PIF_VALUE: 15
PIF_VALUE: 24
PIF_VALUE: 24
PIF_VALUE: 4
PIF_VALUE: 2
PIF_VALUE: 21
PIF_VALUE: 14
PIF_VALUE: 2
PIF_VALUE: 1
PIF_VALUE: 1
PIF_VALUE: 13
PIF_VALUE: 25
PIF_VALUE: 16
PIF_VALUE: 18
PIF_VALUE: 2
PIF_VALUE: 18
PIF_VALUE: 14
PIF_VALUE: 0
PIF_VALUE: 21
PIF_VALUE: 23
PIF_VALUE: 19
PIF_VALUE: 23
PIF_VALUE: 2
PIF_VALUE: 21
PIF_VALUE: 21
PIF_VALUE: 2
PIF_VALUE: 2
PIF_VALUE: 13
PIF_VALUE: 14
PIF_VALUE: 4
PIF_VALUE: 2
PIF_VALUE: 2

## 2019-03-19 ASSESSMENT — PAIN DESCRIPTION - DESCRIPTORS: DESCRIPTORS: DULL

## 2019-03-19 ASSESSMENT — PAIN - FUNCTIONAL ASSESSMENT: PAIN_FUNCTIONAL_ASSESSMENT: 0-10

## 2019-03-19 ASSESSMENT — PAIN SCALES - GENERAL
PAINLEVEL_OUTOF10: 1
PAINLEVEL_OUTOF10: 0
PAINLEVEL_OUTOF10: 4
PAINLEVEL_OUTOF10: 1

## 2019-03-19 ASSESSMENT — PAIN DESCRIPTION - PAIN TYPE: TYPE: SURGICAL PAIN

## 2019-03-19 ASSESSMENT — PAIN DESCRIPTION - LOCATION: LOCATION: ABDOMEN

## 2019-03-20 DIAGNOSIS — D64.9 LOW HEMOGLOBIN: Primary | ICD-10-CM

## 2019-03-29 ENCOUNTER — TELEPHONE (OUTPATIENT)
Dept: FAMILY MEDICINE CLINIC | Age: 59
End: 2019-03-29

## 2019-04-04 ENCOUNTER — OFFICE VISIT (OUTPATIENT)
Dept: SURGERY | Age: 59
End: 2019-04-04

## 2019-04-04 VITALS
HEIGHT: 69 IN | WEIGHT: 148 LBS | DIASTOLIC BLOOD PRESSURE: 56 MMHG | SYSTOLIC BLOOD PRESSURE: 117 MMHG | BODY MASS INDEX: 21.92 KG/M2 | HEART RATE: 90 BPM

## 2019-04-04 DIAGNOSIS — Z90.49 S/P LAPAROSCOPIC CHOLECYSTECTOMY: Primary | ICD-10-CM

## 2019-04-04 PROCEDURE — 99024 POSTOP FOLLOW-UP VISIT: CPT | Performed by: SURGERY

## 2019-04-04 NOTE — PATIENT INSTRUCTIONS
Patient Education        Stopping Smoking: Care Instructions  Your Care Instructions  Cigarette smokers crave the nicotine in cigarettes. Giving it up is much harder than simply changing a habit. Your body has to stop craving the nicotine. It is hard to quit, but you can do it. There are many tools that people use to quit smoking. You may find that combining tools works best for you. There are several steps to quitting. First you get ready to quit. Then you get support to help you. After that, you learn new skills and behaviors to become a nonsmoker. For many people, a necessary step is getting and using medicine. Your doctor will help you set up the plan that best meets your needs. You may want to attend a smoking cessation program to help you quit smoking. When you choose a program, look for one that has proven success. Ask your doctor for ideas. You will greatly increase your chances of success if you take medicine as well as get counseling or join a cessation program.  Some of the changes you feel when you first quit tobacco are uncomfortable. Your body will miss the nicotine at first, and you may feel short-tempered and grumpy. You may have trouble sleeping or concentrating. Medicine can help you deal with these symptoms. You may struggle with changing your smoking habits and rituals. The last step is the tricky one: Be prepared for the smoking urge to continue for a time. This is a lot to deal with, but keep at it. You will feel better. Follow-up care is a key part of your treatment and safety. Be sure to make and go to all appointments, and call your doctor if you are having problems. It's also a good idea to know your test results and keep a list of the medicines you take. How can you care for yourself at home? · Ask your family, friends, and coworkers for support. You have a better chance of quitting if you have help and support.   · Join a support group, such as Nicotine Anonymous, for people who are nicotine. · Be prepared to keep trying. Most people are not successful the first few times they try to quit. Do not get mad at yourself if you smoke again. Make a list of things you learned and think about when you want to try again, such as next week, next month, or next year. Where can you learn more? Go to https://StorkUp.compesunilewtracy.Wallflower. org and sign in to your [employer] account. Enter Q952 in the [employer] box to learn more about \"Stopping Smoking: Care Instructions. \"     If you do not have an account, please click on the \"Sign Up Now\" link. Current as of: September 26, 2018  Content Version: 11.9  © 2222-6269 [employer]. Care instructions adapted under license by [facility] ([facility]). If you have questions about a medical condition or this instruction, always ask your healthcare professional. [address] any warranty or liability for your use of this information.

## 2019-04-04 NOTE — LETTER
[facility]  [address]  [address] [facility] [address]  Phone: [phone]  Fax: [phone]    Chasity Carrion MD        April 4, 2019     Patient: Bernadine Mancilla   YOB: 1960   Date of Visit: 4/4/2019       To Whom It May Concern: It is my medical opinion that Aixa Linton may return to work on Monday, April 8, 2019. No restrictions. If you have any questions or concerns, please don't hesitate to call.     Sincerely,        Chasity Carrion MD

## 2019-04-05 NOTE — PROGRESS NOTES
HPI: Nursing notes reviewed. Patient is a 58 yo who underwent laparoscopic cholecystectomy at [facility].   Reports minimal pain and no nausea. Energy is normal.  resolvign diarrhea and no constipation. ROS:  10 point review of systems performed with pertinent positives in HPI    Phys:    Abd - soft, minimally tender, nondistended   Incisions - no erythema    Assesment: 58 yo s/p laparoscopic cholecystectomy     Plan: 1. Doing well postop with minimal pain and no nausea   2. No activity limitations   3. Low fat diet   4.   Call with concerns

## 2019-05-06 ENCOUNTER — OFFICE VISIT (OUTPATIENT)
Dept: FAMILY MEDICINE CLINIC | Age: 59
End: 2019-05-06
Payer: COMMERCIAL

## 2019-05-06 VITALS
WEIGHT: 151 LBS | OXYGEN SATURATION: 97 % | SYSTOLIC BLOOD PRESSURE: 100 MMHG | BODY MASS INDEX: 22.3 KG/M2 | DIASTOLIC BLOOD PRESSURE: 50 MMHG | TEMPERATURE: 98.7 F | HEART RATE: 96 BPM

## 2019-05-06 DIAGNOSIS — J40 BRONCHITIS: Primary | ICD-10-CM

## 2019-05-06 PROCEDURE — 99213 OFFICE O/P EST LOW 20 MIN: CPT | Performed by: FAMILY MEDICINE

## 2019-05-06 RX ORDER — METHYLPREDNISOLONE 4 MG/1
TABLET ORAL
Qty: 1 KIT | Refills: 0 | Status: SHIPPED | OUTPATIENT
Start: 2019-05-06 | End: 2020-01-08

## 2019-05-06 RX ORDER — AZITHROMYCIN 250 MG/1
250 TABLET, FILM COATED ORAL SEE ADMIN INSTRUCTIONS
Qty: 6 TABLET | Refills: 0 | Status: SHIPPED | OUTPATIENT
Start: 2019-05-06 | End: 2019-05-11

## 2019-05-06 ASSESSMENT — ENCOUNTER SYMPTOMS
COUGH: 1
SHORTNESS OF BREATH: 1
WHEEZING: 1

## 2019-05-06 NOTE — PROGRESS NOTES
2019     Adrian Logan (:  1960) is a 59 y.o. male, here for evaluation of the following medical concerns:    Adrian Logan is a 59 y.o. male. Patient presents with:  Cough: runny nose, chest congestion   Generalized Body Aches  Otalgia        The patients PMH, surgical history, family history, medications, allergies were all reviewed and updated as appropriate today. Cough   This is a new problem. The current episode started 1 to 4 weeks ago. The problem has been gradually worsening. The problem occurs every few minutes. Associated symptoms include nasal congestion, shortness of breath, sweats and wheezing. Pertinent negatives include no chest pain or chills. Nothing aggravates the symptoms. He has tried nothing for the symptoms. The treatment provided no relief. Review of Systems   Constitutional: Negative for chills. Respiratory: Positive for cough, shortness of breath and wheezing. Cardiovascular: Negative for chest pain. Prior to Visit Medications    Medication Sig Taking? Authorizing Provider   FLUoxetine (PROZAC) 20 MG capsule Take 20 mg by mouth daily Yes Historical Provider, MD   famotidine (PEPCID) 20 MG tablet Take 1 tablet by mouth 2 times daily Yes TIM Walker        Social History     Tobacco Use    Smoking status: Current Every Day Smoker     Packs/day: 2.00     Years: 46.00     Pack years: 92.00     Types: Cigarettes    Smokeless tobacco: Never Used   Substance Use Topics    Alcohol use: No        Vitals:    19 1734   BP: (!) 100/50   Pulse: 96   Temp: 98.7 °F (37.1 °C)   TempSrc: Oral   SpO2: 97%   Weight: 151 lb (68.5 kg)     Estimated body mass index is 22.3 kg/m² as calculated from the following:    Height as of 19: 5' 9\" (1.753 m). Weight as of this encounter: 151 lb (68.5 kg). Physical Exam   Constitutional: He is oriented to person, place, and time. He appears well-developed and well-nourished.    HENT:   Head: Normocephalic and atraumatic. Right Ear: External ear normal.   Left Ear: External ear normal.   Nose: Nose normal.   Mouth/Throat: Oropharynx is clear and moist.   Eyes: Pupils are equal, round, and reactive to light. Conjunctivae and EOM are normal.   Neck: Normal range of motion. Neck supple. Cardiovascular: Normal rate, regular rhythm, normal heart sounds and intact distal pulses. Exam reveals no gallop and no friction rub. No murmur heard. Pulmonary/Chest: Effort normal and breath sounds normal. No respiratory distress. He has no wheezes. Abdominal: Soft. Bowel sounds are normal. He exhibits no distension. There is no tenderness. Musculoskeletal: Normal range of motion. He exhibits no edema or tenderness. Neurological: He is alert and oriented to person, place, and time. He has normal reflexes. Skin: Skin is warm and dry. Psychiatric: He has a normal mood and affect. His behavior is normal. Judgment and thought content normal.   Nursing note and vitals reviewed. ASSESSMENT/PLAN:  1. Bronchitis    - azithromycin (ZITHROMAX) 250 MG tablet; Take 1 tablet by mouth See Admin Instructions for 5 days 500mg on day 1 followed by 250mg on days 2 - 5  Dispense: 6 tablet; Refill: 0  - methylPREDNISolone (MEDROL, JENNIFER,) 4 MG tablet; Take by mouth as directed  Dispense: 1 kit; Refill: 0      No follow-ups on file. An electronic signature was used to authenticate this note.     --Sean Will MD on 5/6/2019 at 5:57 PM

## 2019-05-06 NOTE — LETTER
[place] [address]  Phone: [phone]  Fax: [phone]    Yvonne Dupont MD        May 6, 2019     Patient: Kathy Huggins   YOB: 1960   Date of Visit: 5/6/2019       To Whom it May Concern:    Lois Briones was seen in my clinic on 5/6/2019. He may return to work on 5/8/19. If you have any questions or concerns, please don't hesitate to call.     Sincerely,         Yvonne Dupont MD

## 2020-01-08 ENCOUNTER — OFFICE VISIT (OUTPATIENT)
Dept: FAMILY MEDICINE CLINIC | Age: 60
End: 2020-01-08
Payer: COMMERCIAL

## 2020-01-08 VITALS
OXYGEN SATURATION: 98 % | HEART RATE: 77 BPM | BODY MASS INDEX: 23.04 KG/M2 | DIASTOLIC BLOOD PRESSURE: 64 MMHG | SYSTOLIC BLOOD PRESSURE: 122 MMHG | WEIGHT: 156 LBS

## 2020-01-08 PROCEDURE — 99214 OFFICE O/P EST MOD 30 MIN: CPT | Performed by: FAMILY MEDICINE

## 2020-01-08 RX ORDER — METHYLPREDNISOLONE 4 MG/1
TABLET ORAL
Qty: 1 KIT | Refills: 0 | Status: ON HOLD | OUTPATIENT
Start: 2020-01-08 | End: 2020-06-01 | Stop reason: ALTCHOICE

## 2020-01-08 RX ORDER — MECLIZINE HCL 12.5 MG/1
12.5 TABLET ORAL 3 TIMES DAILY PRN
Qty: 30 TABLET | Refills: 0 | Status: SHIPPED | OUTPATIENT
Start: 2020-01-08 | End: 2020-01-18

## 2020-01-08 ASSESSMENT — PATIENT HEALTH QUESTIONNAIRE - PHQ9
SUM OF ALL RESPONSES TO PHQ QUESTIONS 1-9: 0
2. FEELING DOWN, DEPRESSED OR HOPELESS: 0
SUM OF ALL RESPONSES TO PHQ9 QUESTIONS 1 & 2: 0
1. LITTLE INTEREST OR PLEASURE IN DOING THINGS: 0
SUM OF ALL RESPONSES TO PHQ QUESTIONS 1-9: 0

## 2020-01-11 ASSESSMENT — ENCOUNTER SYMPTOMS: WHEEZING: 1

## 2020-01-11 NOTE — PROGRESS NOTES
2020     Mariana Urbina (:  1960) is a 62 y.o. male, here for evaluation of the following medical concerns:    Mariana Urbina is a 62 y.o. male. Patient presents with:  Otalgia  Headache: balance issues   Wheezing: at night when laying down         The patients PMH, surgical history, family history, medications, allergies were all reviewed and updated as appropriate today. Otalgia    There is pain in the left ear. This is a new problem. The current episode started in the past 7 days. The problem has been gradually worsening. There has been no fever. The pain is mild. Associated symptoms include headaches. Associated symptoms comments: dizziness. He has tried nothing for the symptoms. The treatment provided mild relief. His past medical history is significant for hearing loss. There is no history of a chronic ear infection. Headache    Associated symptoms include ear pain. Wheezing    Associated symptoms include ear pain and headaches. Review of Systems   HENT: Positive for ear pain. Respiratory: Positive for wheezing. Neurological: Positive for headaches. Prior to Visit Medications    Medication Sig Taking?  Authorizing Provider   meclizine (ANTIVERT) 12.5 MG tablet Take 1 tablet by mouth 3 times daily as needed for Dizziness Yes Lilia Mares MD   methylPREDNISolone (MEDROL, JENNIFER,) 4 MG tablet Take by mouth as directed Yes Lilia Mares MD   FLUoxetine (PROZAC) 20 MG capsule Take 20 mg by mouth daily Yes Historical Provider, MD   famotidine (PEPCID) 20 MG tablet Take 1 tablet by mouth 2 times daily Yes TIM Angel        Social History     Tobacco Use    Smoking status: Current Every Day Smoker     Packs/day: 2.00     Years: 46.00     Pack years: 92.00     Types: Cigarettes    Smokeless tobacco: Never Used   Substance Use Topics    Alcohol use: No        Vitals:    20 1617   BP: 122/64   Pulse: 77   SpO2: 98%   Weight: 156 lb (70.8 kg)     Estimated body

## 2020-03-30 ENCOUNTER — TELEPHONE (OUTPATIENT)
Dept: FAMILY MEDICINE CLINIC | Age: 60
End: 2020-03-30

## 2020-05-31 ENCOUNTER — APPOINTMENT (OUTPATIENT)
Dept: CT IMAGING | Age: 60
DRG: 809 | End: 2020-05-31
Payer: COMMERCIAL

## 2020-05-31 ENCOUNTER — APPOINTMENT (OUTPATIENT)
Dept: GENERAL RADIOLOGY | Age: 60
DRG: 809 | End: 2020-05-31
Payer: COMMERCIAL

## 2020-05-31 ENCOUNTER — HOSPITAL ENCOUNTER (EMERGENCY)
Age: 60
Discharge: LEFT BEFORE DISCHARGE | DRG: 809 | End: 2020-05-31
Attending: EMERGENCY MEDICINE
Payer: COMMERCIAL

## 2020-05-31 VITALS
WEIGHT: 148 LBS | RESPIRATION RATE: 17 BRPM | TEMPERATURE: 98.5 F | HEIGHT: 69 IN | SYSTOLIC BLOOD PRESSURE: 113 MMHG | BODY MASS INDEX: 21.92 KG/M2 | HEART RATE: 84 BPM | OXYGEN SATURATION: 97 % | DIASTOLIC BLOOD PRESSURE: 57 MMHG

## 2020-05-31 LAB
A/G RATIO: 2.7 (ref 1.1–2.2)
ABO/RH: NORMAL
ALBUMIN SERPL-MCNC: 4.6 G/DL (ref 3.4–5)
ALP BLD-CCNC: 69 U/L (ref 40–129)
ALT SERPL-CCNC: 36 U/L (ref 10–40)
ANION GAP SERPL CALCULATED.3IONS-SCNC: 6 MMOL/L (ref 3–16)
ANISOCYTOSIS: ABNORMAL
ANTIBODY SCREEN: NORMAL
AST SERPL-CCNC: 61 U/L (ref 15–37)
BASOPHILS ABSOLUTE: 0 K/UL (ref 0–0.2)
BASOPHILS RELATIVE PERCENT: 0.3 %
BILIRUB SERPL-MCNC: 1 MG/DL (ref 0–1)
BUN BLDV-MCNC: 14 MG/DL (ref 7–20)
CALCIUM SERPL-MCNC: 8.9 MG/DL (ref 8.3–10.6)
CHLORIDE BLD-SCNC: 99 MMOL/L (ref 99–110)
CO2: 26 MMOL/L (ref 21–32)
CREAT SERPL-MCNC: 0.6 MG/DL (ref 0.8–1.3)
EOSINOPHILS ABSOLUTE: 0.1 K/UL (ref 0–0.6)
EOSINOPHILS RELATIVE PERCENT: 2.6 %
GFR AFRICAN AMERICAN: >60
GFR NON-AFRICAN AMERICAN: >60
GLOBULIN: 1.7 G/DL
GLUCOSE BLD-MCNC: 137 MG/DL (ref 70–99)
HCT VFR BLD CALC: 15.8 % (ref 40.5–52.5)
HEMATOLOGY PATH CONSULT: YES
HEMOGLOBIN: 5.7 G/DL (ref 13.5–17.5)
LYMPHOCYTES ABSOLUTE: 2 K/UL (ref 1–5.1)
LYMPHOCYTES RELATIVE PERCENT: 52.9 %
MCH RBC QN AUTO: 44.5 PG (ref 26–34)
MCHC RBC AUTO-ENTMCNC: 36.3 G/DL (ref 31–36)
MCV RBC AUTO: 122.5 FL (ref 80–100)
MONOCYTES ABSOLUTE: 0 K/UL (ref 0–1.3)
MONOCYTES RELATIVE PERCENT: 0.8 %
NEUTROPHILS ABSOLUTE: 1.7 K/UL (ref 1.7–7.7)
NEUTROPHILS RELATIVE PERCENT: 43.4 %
PDW BLD-RTO: 19 % (ref 12.4–15.4)
PLATELET # BLD: 77 K/UL (ref 135–450)
PLATELET SLIDE REVIEW: ABNORMAL
PMV BLD AUTO: 7.6 FL (ref 5–10.5)
POIKILOCYTES: ABNORMAL
POLYCHROMASIA: ABNORMAL
POTASSIUM SERPL-SCNC: 3.5 MMOL/L (ref 3.5–5.1)
RBC # BLD: 1.29 M/UL (ref 4.2–5.9)
SCHISTOCYTES: ABNORMAL
SLIDE REVIEW: ABNORMAL
SODIUM BLD-SCNC: 131 MMOL/L (ref 136–145)
TEAR DROP CELLS: ABNORMAL
TOTAL PROTEIN: 6.3 G/DL (ref 6.4–8.2)
WBC # BLD: 3.8 K/UL (ref 4–11)

## 2020-05-31 PROCEDURE — 80053 COMPREHEN METABOLIC PANEL: CPT

## 2020-05-31 PROCEDURE — 86901 BLOOD TYPING SEROLOGIC RH(D): CPT

## 2020-05-31 PROCEDURE — 86900 BLOOD TYPING SEROLOGIC ABO: CPT

## 2020-05-31 PROCEDURE — 99284 EMERGENCY DEPT VISIT MOD MDM: CPT

## 2020-05-31 PROCEDURE — 86850 RBC ANTIBODY SCREEN: CPT

## 2020-05-31 PROCEDURE — 36415 COLL VENOUS BLD VENIPUNCTURE: CPT

## 2020-05-31 PROCEDURE — 36430 TRANSFUSION BLD/BLD COMPNT: CPT

## 2020-05-31 PROCEDURE — 71046 X-RAY EXAM CHEST 2 VIEWS: CPT

## 2020-05-31 PROCEDURE — 70450 CT HEAD/BRAIN W/O DYE: CPT

## 2020-05-31 PROCEDURE — 93005 ELECTROCARDIOGRAM TRACING: CPT | Performed by: NURSE PRACTITIONER

## 2020-05-31 PROCEDURE — 86923 COMPATIBILITY TEST ELECTRIC: CPT

## 2020-05-31 PROCEDURE — 85025 COMPLETE CBC W/AUTO DIFF WBC: CPT

## 2020-05-31 RX ORDER — 0.9 % SODIUM CHLORIDE 0.9 %
20 INTRAVENOUS SOLUTION INTRAVENOUS ONCE
Status: DISCONTINUED | OUTPATIENT
Start: 2020-05-31 | End: 2020-06-01 | Stop reason: HOSPADM

## 2020-05-31 ASSESSMENT — ENCOUNTER SYMPTOMS
PHOTOPHOBIA: 0
ABDOMINAL PAIN: 0
VOMITING: 0
SHORTNESS OF BREATH: 0
COUGH: 0
DIARRHEA: 0
NAUSEA: 0
WHEEZING: 0
COLOR CHANGE: 0
BACK PAIN: 0

## 2020-05-31 ASSESSMENT — PAIN DESCRIPTION - ORIENTATION: ORIENTATION: LEFT;RIGHT

## 2020-05-31 ASSESSMENT — PAIN SCALES - GENERAL: PAINLEVEL_OUTOF10: 5

## 2020-05-31 ASSESSMENT — PAIN DESCRIPTION - LOCATION: LOCATION: LEG;FOOT

## 2020-05-31 ASSESSMENT — PAIN DESCRIPTION - PAIN TYPE: TYPE: CHRONIC PAIN

## 2020-06-01 ENCOUNTER — HOSPITAL ENCOUNTER (INPATIENT)
Age: 60
LOS: 1 days | Discharge: HOME | DRG: 809 | End: 2020-06-02
Attending: EMERGENCY MEDICINE | Admitting: INTERNAL MEDICINE
Payer: COMMERCIAL

## 2020-06-01 PROBLEM — D61.818 PANCYTOPENIA (HCC): Status: ACTIVE | Noted: 2020-06-01

## 2020-06-01 LAB
ABO/RH: NORMAL
ANTIBODY SCREEN: NORMAL
APTT: 31 SEC (ref 24.2–36.2)
BACTERIA: ABNORMAL /HPF
BILIRUBIN URINE: NEGATIVE
BLOOD BANK DISPENSE STATUS: NORMAL
BLOOD BANK PRODUCT CODE: NORMAL
BLOOD SMEAR REVIEW: NORMAL
BLOOD, URINE: ABNORMAL
BPU ID: NORMAL
CLARITY: CLEAR
COLOR: YELLOW
D DIMER: 576 NG/ML DDU (ref 0–229)
DAT IGG CAPTURE: NORMAL
DESCRIPTION BLOOD BANK: NORMAL
EKG ATRIAL RATE: 85 BPM
EKG DIAGNOSIS: NORMAL
EKG P AXIS: 77 DEGREES
EKG P-R INTERVAL: 162 MS
EKG Q-T INTERVAL: 412 MS
EKG QRS DURATION: 136 MS
EKG QTC CALCULATION (BAZETT): 490 MS
EKG R AXIS: -46 DEGREES
EKG T AXIS: 84 DEGREES
EKG VENTRICULAR RATE: 85 BPM
FERRITIN: 322 NG/ML (ref 30–400)
FIBRINOGEN: 257 MG/DL (ref 200–397)
FOLATE: 17.41 NG/ML (ref 4.78–24.2)
GLUCOSE URINE: NEGATIVE MG/DL
HAPTOGLOBIN: <10 MG/DL (ref 30–200)
HCT VFR BLD CALC: 24.1 % (ref 40.5–52.5)
HCT VFR BLD CALC: 24.3 % (ref 40.5–52.5)
HEMATOLOGY PATH CONSULT: NORMAL
HEMOGLOBIN: 8.7 G/DL (ref 13.5–17.5)
IMMATURE RETIC FRACT: 0.21 (ref 0.21–0.37)
INR BLD: 1.06 (ref 0.86–1.14)
IRON SATURATION: 82 % (ref 20–50)
IRON: 194 UG/DL (ref 59–158)
KETONES, URINE: NEGATIVE MG/DL
LACTATE DEHYDROGENASE: >2500 U/L (ref 100–190)
LEUKOCYTE ESTERASE, URINE: NEGATIVE
MICROSCOPIC EXAMINATION: YES
NITRITE, URINE: NEGATIVE
PH UA: 6.5 (ref 5–8)
PROTEIN UA: NEGATIVE MG/DL
PROTHROMBIN TIME: 12.3 SEC (ref 10–13.2)
RBC UA: ABNORMAL /HPF (ref 0–4)
RETICULOCYTE ABSOLUTE COUNT: 0.05 M/UL
RETICULOCYTE COUNT PCT: 2.07 % (ref 0.5–2.18)
SEDIMENTATION RATE, ERYTHROCYTE: 17 MM/HR (ref 0–20)
SPECIFIC GRAVITY UA: 1.01 (ref 1–1.03)
TOTAL IRON BINDING CAPACITY: 238 UG/DL (ref 260–445)
TRANSFERRIN: 175 MG/DL (ref 200–360)
URIC ACID, SERUM: 5.3 MG/DL (ref 3.5–7.2)
URINE TYPE: ABNORMAL
UROBILINOGEN, URINE: 1 E.U./DL
VITAMIN B-12: <150 PG/ML (ref 211–911)
WBC UA: ABNORMAL /HPF (ref 0–5)

## 2020-06-01 PROCEDURE — 85018 HEMOGLOBIN: CPT

## 2020-06-01 PROCEDURE — 86880 COOMBS TEST DIRECT: CPT

## 2020-06-01 PROCEDURE — 84466 ASSAY OF TRANSFERRIN: CPT

## 2020-06-01 PROCEDURE — 86023 IMMUNOGLOBULIN ASSAY: CPT

## 2020-06-01 PROCEDURE — 85397 CLOTTING FUNCT ACTIVITY: CPT

## 2020-06-01 PROCEDURE — 2580000003 HC RX 258: Performed by: EMERGENCY MEDICINE

## 2020-06-01 PROCEDURE — 6370000000 HC RX 637 (ALT 250 FOR IP): Performed by: INTERNAL MEDICINE

## 2020-06-01 PROCEDURE — 81001 URINALYSIS AUTO W/SCOPE: CPT

## 2020-06-01 PROCEDURE — 84165 PROTEIN E-PHORESIS SERUM: CPT

## 2020-06-01 PROCEDURE — 85379 FIBRIN DEGRADATION QUANT: CPT

## 2020-06-01 PROCEDURE — 83010 ASSAY OF HAPTOGLOBIN QUANT: CPT

## 2020-06-01 PROCEDURE — P9016 RBC LEUKOCYTES REDUCED: HCPCS

## 2020-06-01 PROCEDURE — 86022 PLATELET ANTIBODIES: CPT

## 2020-06-01 PROCEDURE — 86901 BLOOD TYPING SEROLOGIC RH(D): CPT

## 2020-06-01 PROCEDURE — 85045 AUTOMATED RETICULOCYTE COUNT: CPT

## 2020-06-01 PROCEDURE — 86923 COMPATIBILITY TEST ELECTRIC: CPT

## 2020-06-01 PROCEDURE — 82607 VITAMIN B-12: CPT

## 2020-06-01 PROCEDURE — 86900 BLOOD TYPING SEROLOGIC ABO: CPT

## 2020-06-01 PROCEDURE — 82746 ASSAY OF FOLIC ACID SERUM: CPT

## 2020-06-01 PROCEDURE — 93010 ELECTROCARDIOGRAM REPORT: CPT | Performed by: INTERNAL MEDICINE

## 2020-06-01 PROCEDURE — 84550 ASSAY OF BLOOD/URIC ACID: CPT

## 2020-06-01 PROCEDURE — 86140 C-REACTIVE PROTEIN: CPT

## 2020-06-01 PROCEDURE — 1200000000 HC SEMI PRIVATE

## 2020-06-01 PROCEDURE — 36415 COLL VENOUS BLD VENIPUNCTURE: CPT

## 2020-06-01 PROCEDURE — 85610 PROTHROMBIN TIME: CPT

## 2020-06-01 PROCEDURE — 85730 THROMBOPLASTIN TIME PARTIAL: CPT

## 2020-06-01 PROCEDURE — 85014 HEMATOCRIT: CPT

## 2020-06-01 PROCEDURE — 84238 ASSAY NONENDOCRINE RECEPTOR: CPT

## 2020-06-01 PROCEDURE — 85652 RBC SED RATE AUTOMATED: CPT

## 2020-06-01 PROCEDURE — 86850 RBC ANTIBODY SCREEN: CPT

## 2020-06-01 PROCEDURE — 84155 ASSAY OF PROTEIN SERUM: CPT

## 2020-06-01 PROCEDURE — 36430 TRANSFUSION BLD/BLD COMPNT: CPT

## 2020-06-01 PROCEDURE — 99285 EMERGENCY DEPT VISIT HI MDM: CPT

## 2020-06-01 PROCEDURE — 82728 ASSAY OF FERRITIN: CPT

## 2020-06-01 PROCEDURE — 83883 ASSAY NEPHELOMETRY NOT SPEC: CPT

## 2020-06-01 PROCEDURE — 82784 ASSAY IGA/IGD/IGG/IGM EACH: CPT

## 2020-06-01 PROCEDURE — 83540 ASSAY OF IRON: CPT

## 2020-06-01 PROCEDURE — 85384 FIBRINOGEN ACTIVITY: CPT

## 2020-06-01 PROCEDURE — 2580000003 HC RX 258: Performed by: INTERNAL MEDICINE

## 2020-06-01 PROCEDURE — 83615 LACTATE (LD) (LDH) ENZYME: CPT

## 2020-06-01 PROCEDURE — 82232 ASSAY OF BETA-2 PROTEIN: CPT

## 2020-06-01 RX ORDER — SODIUM CHLORIDE 0.9 % (FLUSH) 0.9 %
10 SYRINGE (ML) INJECTION PRN
Status: DISCONTINUED | OUTPATIENT
Start: 2020-06-01 | End: 2020-06-02 | Stop reason: HOSPADM

## 2020-06-01 RX ORDER — PROMETHAZINE HYDROCHLORIDE 25 MG/1
12.5 TABLET ORAL EVERY 6 HOURS PRN
Status: DISCONTINUED | OUTPATIENT
Start: 2020-06-01 | End: 2020-06-02 | Stop reason: HOSPADM

## 2020-06-01 RX ORDER — ONDANSETRON 2 MG/ML
4 INJECTION INTRAMUSCULAR; INTRAVENOUS EVERY 6 HOURS PRN
Status: DISCONTINUED | OUTPATIENT
Start: 2020-06-01 | End: 2020-06-02 | Stop reason: HOSPADM

## 2020-06-01 RX ORDER — NICOTINE 21 MG/24HR
1 PATCH, TRANSDERMAL 24 HOURS TRANSDERMAL DAILY
Status: DISCONTINUED | OUTPATIENT
Start: 2020-06-01 | End: 2020-06-02 | Stop reason: HOSPADM

## 2020-06-01 RX ORDER — ACETAMINOPHEN 325 MG/1
650 TABLET ORAL EVERY 6 HOURS PRN
Status: DISCONTINUED | OUTPATIENT
Start: 2020-06-01 | End: 2020-06-02 | Stop reason: HOSPADM

## 2020-06-01 RX ORDER — 0.9 % SODIUM CHLORIDE 0.9 %
20 INTRAVENOUS SOLUTION INTRAVENOUS ONCE
Status: COMPLETED | OUTPATIENT
Start: 2020-06-01 | End: 2020-06-01

## 2020-06-01 RX ORDER — SODIUM CHLORIDE 9 MG/ML
INJECTION, SOLUTION INTRAVENOUS
Status: DISPENSED
Start: 2020-06-01 | End: 2020-06-02

## 2020-06-01 RX ORDER — SODIUM CHLORIDE 0.9 % (FLUSH) 0.9 %
10 SYRINGE (ML) INJECTION EVERY 12 HOURS SCHEDULED
Status: DISCONTINUED | OUTPATIENT
Start: 2020-06-01 | End: 2020-06-02 | Stop reason: HOSPADM

## 2020-06-01 RX ORDER — POLYETHYLENE GLYCOL 3350 17 G/17G
17 POWDER, FOR SOLUTION ORAL DAILY PRN
Status: DISCONTINUED | OUTPATIENT
Start: 2020-06-01 | End: 2020-06-02 | Stop reason: HOSPADM

## 2020-06-01 RX ORDER — ACETAMINOPHEN 650 MG/1
650 SUPPOSITORY RECTAL EVERY 6 HOURS PRN
Status: DISCONTINUED | OUTPATIENT
Start: 2020-06-01 | End: 2020-06-02 | Stop reason: HOSPADM

## 2020-06-01 RX ADMIN — SODIUM CHLORIDE 20 ML: 900 INJECTION, SOLUTION INTRAVENOUS at 10:40

## 2020-06-01 RX ADMIN — Medication 10 ML: at 20:03

## 2020-06-01 NOTE — H&P
Hospital Medicine History & Physical      PCP: Enrrique Pepper MD    Date of Admission: 6/1/2020    Date of Service: Pt seen/examined on 1 June and Admitted to Inpatient with expected LOS greater than two midnights due to medical therapy. Chief Complaint:  Dizziness      History Of Present Illness:        62 y.o. male who presented to [facility] with a c/o progressive dizziness x several days. Labs revealed pancytopenia but but initially refused admission. He returned for persistent sxs w/out associated CP/SOB. The patient denies any fever/chills or other signs/sxs of systemic illness or identifiable aggravating/alleviating factors. Past Medical History:          Diagnosis Date    Acid reflux     Cholelithiasis 03/19/2019    Depression     DM (diabetes mellitus) ([facility] [address]     History of left bundle branch block (LBBB)     HX OTHER MEDICAL     \"blood doesn't flow fast enough into heart\"    Wears dentures        Past Surgical History:          Procedure Laterality Date    APPENDECTOMY      CHOLECYSTECTOMY, LAPAROSCOPIC N/A 3/19/2019    LAPAROSCOPIC CHOLECYSTECTOMY WITH INTRAOPERATIVE, CHOLANGIOGRAM performed by Himanshu Gomez MD at [facility] COLONOSCOPY         Medications Prior to Admission:      Prior to Admission medications    Medication Sig Start Date End Date Taking? Authorizing Provider   FLUoxetine (PROZAC) 20 MG capsule Take 20 mg by mouth daily    Historical Provider, MD   famotidine (PEPCID) 20 MG tablet Take 1 tablet by mouth 2 times daily 3/1/19   TIM Kraus       Allergies:  Patient has no known allergies. Social History:      The patient currently lives independently    TOBACCO:   reports that he has been smoking cigarettes. He has a 92.00 pack-year smoking history. He has never used smokeless tobacco.  ETOH:   reports no history of alcohol use. Family History:      Reviewed in detail and negative for DM, CAD, CVA.  Positive as follows:

## 2020-06-01 NOTE — ED PROVIDER NOTES
tachypneic or dyspneic, saturations are 98% on room air. Abdominal:      Palpations: Abdomen is soft. Tenderness: There is no abdominal tenderness. Musculoskeletal: Normal range of motion. Skin:     General: Skin is warm. Coloration: Skin is not pale. Neurological:      Mental Status: He is alert and oriented to person, place, and time. GCS: GCS eye subscore is 4. GCS verbal subscore is 5. GCS motor subscore is 6. Cranial Nerves: Cranial nerves are intact. Sensory: Sensation is intact. Motor: Motor function is intact. Comments: Although patient states he feels dizzy on exam he denies any dizziness or resting in stretcher, he states it is worse with ambulation. He states his been dealing with chronic numbness in his bilateral feet however on exam he is neurovascular intact without numbness tingling or paresthesias. He states his feet just feel heavy. He is awake, alert following commands correctly, neurologically intact no focal deficits. No numbness or tingling or paresthesias, no saddle anesthesia, no loss of bowel or bladder control or urinary retention. No facial asymmetry. Tongue is midline.    Psychiatric:         Mood and Affect: Mood normal.         Behavior: Behavior normal.         MEDICAL DECISION MAKING    Vitals:    Vitals:    05/31/20 1943 05/31/20 1945 05/31/20 2100 05/31/20 2144   BP: 133/73 133/73  (!) 113/57   Pulse: 99  86 91   Resp: 17      Temp: 98.5 °F (36.9 °C)      TempSrc: Oral      SpO2: 100% 100% 98% 100%   Weight: 148 lb (67.1 kg)      Height: 5' 9\" (1.753 m)          LABS:  Labs Reviewed   CBC WITH AUTO DIFFERENTIAL - Abnormal; Notable for the following components:       Result Value    WBC 3.8 (*)     RBC 1.29 (*)     Hemoglobin 5.7 (*)     Hematocrit 15.8 (*)     .5 (*)     MCH 44.5 (*)     MCHC 36.3 (*)     RDW 19.0 (*)     Platelets 77 (*)     Anisocytosis 2+ (*)     Polychromasia Occasional (*)     Poikilocytes 1+ (*)

## 2020-06-01 NOTE — ED NOTES
Pt states \"I just want to get the blood and go home, I'm not staying. \" This nurse, ED NP, and ED MD attempted to discuss with patient the importance of him staying to receive blood and be admitted. Pt denies wanting admission and states \"I just want to go home. \"      Iliana Mcgraw RN  05/31/20 1912

## 2020-06-01 NOTE — ED NOTES
Pt alert and oriented. Consent for PRBC signed by pt and witnessed by this RN.      Crescencio Barger RN  06/01/20 8719

## 2020-06-01 NOTE — CONSULTS
Hematology/Oncology Consultation         Patient:   Alben Gilford       Reason for Consult:  Pancytopenia   Requesting Physician: No ref. provider found    Chief Complaint:     Chief Complaint   Patient presents with    Other     Pt seen last night and left AMA. Low blood counts. States he is now willing to stay to receive PRBC. History Obtained from:     patient, electronic medical record    History of Present Illness:     Alben Gilford is a 62 y.o. male  with significant past medical history of esophageal varices, depression, DM, hx of alcoholism who presents with WBC 3.8, Hgb 5.7, and plt 77k. ANC is 1.7. He denies acute ETOH abuse. He has had a blood transfusion prior at [address] 5 years ago for \"coughing up blood, I had a tear in my esophagus\". CBC in March 2019 shows WBC 5.2, Hgb 9.7, and plt 170k. Plt have been as low as 124 in Feb 2019. Denies drenching night sweats or weight loss. LDH is over 2500. Creat is 0.5. Iron, b12 and folate are pending. He feels well despite Hgb of 5.7. NO SOB or chest pain.      Past Medical History:         Diagnosis Date    Acid reflux     Cholelithiasis 03/19/2019    Depression     DM (diabetes mellitus) ([facility] [address]     History of left bundle branch block (LBBB)     HX OTHER MEDICAL     \"blood doesn't flow fast enough into heart\"    Wears dentures        Past Surgical History:         Procedure Laterality Date    APPENDECTOMY      CHOLECYSTECTOMY, LAPAROSCOPIC N/A 3/19/2019    LAPAROSCOPIC CHOLECYSTECTOMY WITH INTRAOPERATIVE, CHOLANGIOGRAM performed by [place] MD at [address] COLONOSCOPY         Current Medications:     Current Facility-Administered Medications   Medication Dose Route Frequency Provider Last Rate Last Dose    0.9 % sodium chloride bolus  20 mL Intravenous Once [facility] V, DO 10 mL/hr at 06/01/20 1040 20 mL at 06/01/20 1040    sodium chloride flush 0.9 % injection 10 mL  10 mL Intravenous 2 times per 1.020 03/01/2019    LEUKOCYTESUR Negative 03/01/2019    UROBILINOGEN 0.2 03/01/2019    BILIRUBINUR Negative 03/01/2019    BLOODU TRACE-LYSED 03/01/2019    GLUCOSEU Negative 03/01/2019       Imaging:     CT head negative     Impression:     Pancytopenia   Depression   Diabetic   Acid Reflux     Plan:     Pancytopenia  - Send flow cytometry and smear   - LDH is over 2500- get Haptoglobin   - Plt 77 with hx of alcoholism- but no recent ETOH abuse. - Send platelet antibody and ADAMTS 13 (less likely TTP since creat stable)   - Send uric acid levels   - Send labs for DIC/HIT  - Will likely need a bone marrow bx pending labs above  - Send iron studies-hx of anemia with bleeding esophageal varices ([facility] 5 years ago)      - Get US liver. - Send sed rate, CRP, stool for blood and UA     MD to see in the AM.       Thank you very much for allowing me to participate in the care of this patient.     Angelica Rollins, MARTÍN   Medical Oncology/Hematology    [facility] - [address],  [address]  Phone: [phone]  Fax: [phone]

## 2020-06-01 NOTE — FLOWSHEET NOTE
06/01/20 1648   Encounter Summary   Services provided to: Patient   Referral/Consult From: Nurse   Support System Spouse   Continue Visiting   (6/1 Initial visit/No needs/Not interested in ADs.)   Complexity of Encounter High   Length of Encounter 15 minutes   Spiritual/Restorationist   Type Spiritual support   Assessment Calm; Approachable;Passive;Coping  (Wants to get back to work.)   Intervention Active listening;Explored feelings, thoughts, concerns;Nurtured hope   Outcome Expressed gratitude;Coping;Encouraged   Advance Directives (For Healthcare)   Healthcare Directive No, patient does not have an advance directive for healthcare treatment   Advance Directives Pt. not interested at this time

## 2020-06-01 NOTE — ED TRIAGE NOTES
Pt was just seen here earlier this shift/last night. Pts blood counts are low, but decided to leave AMA. Pt cooperative at this time and states he is willing to be admitted for further workup.

## 2020-06-01 NOTE — ED PROVIDER NOTES
CHIEF COMPLAINT  Other (Pt seen last night and left AMA. Low blood counts. States he is now willing to stay to receive PRBC.)      HISTORY OF PRESENT ILLNESS  Lynn Cintron is a 62 y.o. male who presents to the ED with c/o dizziness, left AMA last night after found to be pancytopenic with low hgb. Refused further eval including admission as well as PRBC. Now dizzy, willing to stay for further eval and mgmt. No other complaints, modifying factors or associated symptoms. I have reviewed the following from the nursing documentation.     Past Medical History:   Diagnosis Date    Acid reflux     Cholelithiasis 03/19/2019    Depression     DM (diabetes mellitus) (HCC)     History of left bundle branch block (LBBB)     HX OTHER MEDICAL     \"blood doesn't flow fast enough into heart\"    Wears dentures      Past Surgical History:   Procedure Laterality Date    APPENDECTOMY      CHOLECYSTECTOMY, LAPAROSCOPIC N/A 3/19/2019    LAPAROSCOPIC CHOLECYSTECTOMY WITH INTRAOPERATIVE, CHOLANGIOGRAM performed by Akira Wilder MD at [address] [place] [address]       Family History   Problem Relation Age of Onset    Cancer Father         lung    Mult Sclerosis Maternal Aunt      Social History     Socioeconomic History    Marital status:      Spouse name: Not on file    Number of children: Not on file    Years of education: Not on file    Highest education level: Not on file   Occupational History    Not on file   Social Needs    Financial resource strain: Not on file    Food insecurity     Worry: Not on file     Inability: Not on file   [employer] needs     Medical: Not on file     Non-medical: Not on file   Tobacco Use    Smoking status: Current Every Day Smoker     Packs/day: 2.00     Years: 46.00     Pack years: 92.00     Types: Cigarettes    Smokeless tobacco: Never Used   Substance and Sexual Activity    Alcohol use: No    Drug use: No    Sexual activity: Yes     Partners: Female This chart was generated in part by using Dragon Dictation system and may contain errors related to that system including errors in grammar, punctuation, and spelling, as well as words and phrases that may be inappropriate. When dictating, effort is made to correct spelling/grammar errors. If there are any questions or concerns please feel free to contact the dictating provider for clarification.      MADHU Pereira 49, DO  ATTENDING, [address] [place] [address],   06/02/20 [facility]

## 2020-06-01 NOTE — CARE COORDINATION
CASE MANAGEMENT INITIAL ASSESSMENT      Reviewed chart and completed assessment via telephone with:patient   Explained Case Management role/services. Primary contact information: Katherin Eduardo    Admit date/status: 6/1/20  Diagnosis: pancytopenia  Is this a Readmission?:n      [facility]  [facility] for [facility] - Y        3 night stay required - N    Living arrangements, Adls, care needs, prior to admission lives in [address] [place] [address] with wife. 6 step entry:     Transportation: [address] at home: Walker__Cane_x_RTS__ BSC__Shower Chair__  02__ HHN__ CPAP__  BiPap__  Hospital Bed__ W/C___ Other__________    Services in the home and/or outpatient, prior to admission: none    Dialysis Facility (if applicable) none  · Name:  · Address:  · Dialysis Schedule:  · Phone:  · Fax:    PT/OT recs: none    Hospital Exemption Notification (HEN): needed for sNF    Barriers to discharge: none    Plan/comments: Patient states lives with wife in apartment with 6 step entry. Is ambulatory with cane and princess to get up stairs with assistance of railing. Hem onc consult pending. Transfusion today.  Sharon Rondon RN      [facility] on chart for MD mclain

## 2020-06-02 ENCOUNTER — APPOINTMENT (OUTPATIENT)
Dept: ULTRASOUND IMAGING | Age: 60
DRG: 809 | End: 2020-06-02
Payer: COMMERCIAL

## 2020-06-02 VITALS
DIASTOLIC BLOOD PRESSURE: 72 MMHG | TEMPERATURE: 98.5 F | HEIGHT: 69 IN | RESPIRATION RATE: 16 BRPM | OXYGEN SATURATION: 98 % | HEART RATE: 74 BPM | BODY MASS INDEX: 21.77 KG/M2 | WEIGHT: 147 LBS | SYSTOLIC BLOOD PRESSURE: 116 MMHG

## 2020-06-02 LAB
C-REACTIVE PROTEIN: 6.4 MG/L (ref 0–5.1)
HEMATOLOGY PATH CONSULT: NORMAL
HEPARIN INDUCED PLATELET ANTIBODY: NEGATIVE
IGA: 159 MG/DL (ref 70–400)
IGG: 599 MG/DL (ref 700–1600)
IGM: 25 MG/DL (ref 40–230)
KAPPA, FREE LIGHT CHAINS, SERUM: 12.26 MG/L (ref 3.3–19.4)
KAPPA/LAMBDA RATIO: 1.01 (ref 0.26–1.65)
KAPPA/LAMBDA TEST COMMENT: NORMAL
LAMBDA, FREE LIGHT CHAINS, SERUM: 12.18 MG/L (ref 5.71–26.3)

## 2020-06-02 PROCEDURE — 2580000003 HC RX 258: Performed by: INTERNAL MEDICINE

## 2020-06-02 PROCEDURE — 76705 ECHO EXAM OF ABDOMEN: CPT

## 2020-06-02 PROCEDURE — 6360000002 HC RX W HCPCS: Performed by: INTERNAL MEDICINE

## 2020-06-02 PROCEDURE — 6370000000 HC RX 637 (ALT 250 FOR IP): Performed by: INTERNAL MEDICINE

## 2020-06-02 RX ORDER — CYANOCOBALAMIN 1000 UG/ML
1000 INJECTION INTRAMUSCULAR; SUBCUTANEOUS DAILY
Status: DISCONTINUED | OUTPATIENT
Start: 2020-06-02 | End: 2020-06-02 | Stop reason: HOSPADM

## 2020-06-02 RX ORDER — FOLIC ACID 1 MG/1
1 TABLET ORAL DAILY
Status: DISCONTINUED | OUTPATIENT
Start: 2020-06-02 | End: 2020-06-02 | Stop reason: HOSPADM

## 2020-06-02 RX ORDER — FOLIC ACID 1 MG/1
1 TABLET ORAL DAILY
Qty: 30 TABLET | Refills: 3 | Status: SHIPPED | OUTPATIENT
Start: 2020-06-03 | End: 2021-08-17

## 2020-06-02 RX ADMIN — FOLIC ACID 1 MG: 1 TABLET ORAL at 10:35

## 2020-06-02 RX ADMIN — CYANOCOBALAMIN 1000 MCG: 1000 INJECTION, SOLUTION INTRAMUSCULAR; SUBCUTANEOUS at 10:35

## 2020-06-02 RX ADMIN — Medication 10 ML: at 10:35

## 2020-06-02 NOTE — PROGRESS NOTES
Pt has been NPO sine midnight and is adamantly requesting to eat. Hospitalist messaged via [employer], awaiting response/orders.

## 2020-06-02 NOTE — CARE COORDINATION
Chart reviewed. Spoke with Britni Forbes. Patient wanting to D/C ASAP. Denied any DCP needs.  Brenna Basurto RN

## 2020-06-03 ENCOUNTER — TELEPHONE (OUTPATIENT)
Dept: FAMILY MEDICINE CLINIC | Age: 60
End: 2020-06-03

## 2020-06-03 LAB
ALBUMIN SERPL-MCNC: 3.7 G/DL (ref 3.1–4.9)
ALPHA-1-GLOBULIN: 0.3 G/DL (ref 0.2–0.4)
ALPHA-2-GLOBULIN: 0.5 G/DL (ref 0.4–1.1)
BETA GLOBULIN: 0.7 G/DL (ref 0.9–1.6)
BETA-2 MICROGLOBULIN: 2.4 MG/L (ref 1.1–2.4)
GAMMA GLOBULIN: 0.6 G/DL (ref 0.6–1.8)
SOLUBLE TRANSFERRIN RECEPT: 4.5 MG/L (ref 2.2–5)
SPE/IFE INTERPRETATION: NORMAL
TOTAL PROTEIN: 5.8 G/DL (ref 6.4–8.2)

## 2020-06-04 LAB
PLATELET ANTIBODY, IGG: NEGATIVE
PLATELET ANTIBODY, IGM: NEGATIVE

## 2020-06-05 ENCOUNTER — NURSE ONLY (OUTPATIENT)
Dept: FAMILY MEDICINE CLINIC | Age: 60
End: 2020-06-05
Payer: COMMERCIAL

## 2020-06-05 VITALS — TEMPERATURE: 98 F

## 2020-06-05 LAB — ADAMTS13 ACTIVITY: 71 %

## 2020-06-05 PROCEDURE — 96372 THER/PROPH/DIAG INJ SC/IM: CPT | Performed by: FAMILY MEDICINE

## 2020-06-05 RX ORDER — CYANOCOBALAMIN 1000 UG/ML
1000 INJECTION INTRAMUSCULAR; SUBCUTANEOUS ONCE
Status: COMPLETED | OUTPATIENT
Start: 2020-06-05 | End: 2020-06-05

## 2020-06-05 RX ADMIN — CYANOCOBALAMIN 1000 MCG: 1000 INJECTION INTRAMUSCULAR; SUBCUTANEOUS at 09:32

## 2020-11-16 ENCOUNTER — OFFICE VISIT (OUTPATIENT)
Dept: FAMILY MEDICINE CLINIC | Age: 60
End: 2020-11-16
Payer: COMMERCIAL

## 2020-11-16 VITALS
DIASTOLIC BLOOD PRESSURE: 62 MMHG | BODY MASS INDEX: 22.15 KG/M2 | SYSTOLIC BLOOD PRESSURE: 120 MMHG | TEMPERATURE: 98.9 F | OXYGEN SATURATION: 98 % | WEIGHT: 150 LBS | HEART RATE: 78 BPM

## 2020-11-16 LAB — HBA1C MFR BLD: 5.9 %

## 2020-11-16 PROCEDURE — 83036 HEMOGLOBIN GLYCOSYLATED A1C: CPT | Performed by: FAMILY MEDICINE

## 2020-11-16 PROCEDURE — 96372 THER/PROPH/DIAG INJ SC/IM: CPT | Performed by: FAMILY MEDICINE

## 2020-11-16 PROCEDURE — 99214 OFFICE O/P EST MOD 30 MIN: CPT | Performed by: FAMILY MEDICINE

## 2020-11-16 RX ORDER — FLUOXETINE HYDROCHLORIDE 20 MG/1
20 CAPSULE ORAL DAILY
Qty: 30 CAPSULE | Refills: 5 | Status: SHIPPED | OUTPATIENT
Start: 2020-11-16 | End: 2021-10-07

## 2020-11-16 RX ORDER — SILDENAFIL 50 MG/1
50 TABLET, FILM COATED ORAL DAILY PRN
Qty: 10 TABLET | Refills: 0 | Status: SHIPPED | OUTPATIENT
Start: 2020-11-16

## 2020-11-16 RX ORDER — CYANOCOBALAMIN 1000 UG/ML
1000 INJECTION INTRAMUSCULAR; SUBCUTANEOUS ONCE
Status: COMPLETED | OUTPATIENT
Start: 2020-11-16 | End: 2020-11-16

## 2020-11-16 RX ADMIN — CYANOCOBALAMIN 1000 MCG: 1000 INJECTION INTRAMUSCULAR; SUBCUTANEOUS at 17:41

## 2021-03-31 ENCOUNTER — IMMUNIZATION (OUTPATIENT)
Dept: PRIMARY CARE CLINIC | Age: 61
End: 2021-03-31
Payer: OTHER GOVERNMENT

## 2021-03-31 PROCEDURE — 0001A COVID-19, PFIZER VACCINE 30MCG/0.3ML DOSE: CPT | Performed by: FAMILY MEDICINE

## 2021-03-31 PROCEDURE — 91300 COVID-19, PFIZER VACCINE 30MCG/0.3ML DOSE: CPT | Performed by: FAMILY MEDICINE

## 2021-04-21 ENCOUNTER — IMMUNIZATION (OUTPATIENT)
Dept: PRIMARY CARE CLINIC | Age: 61
End: 2021-04-21
Payer: MEDICAID

## 2021-04-21 PROCEDURE — 91300 COVID-19, PFIZER VACCINE 30MCG/0.3ML DOSE: CPT | Performed by: FAMILY MEDICINE

## 2021-04-21 PROCEDURE — 0002A COVID-19, PFIZER VACCINE 30MCG/0.3ML DOSE: CPT | Performed by: FAMILY MEDICINE

## 2021-08-06 ENCOUNTER — TELEPHONE (OUTPATIENT)
Dept: FAMILY MEDICINE CLINIC | Age: 61
End: 2021-08-06

## 2021-08-06 NOTE — TELEPHONE ENCOUNTER
----- Message from [facility] sent at 8/6/2021  1:55 PM EDT -----  Subject: Referral Request    QUESTIONS   Reason for referral request? Patient would like routine labs done. Has the physician seen you for this condition before? No   Preferred Specialist (if applicable)? Do you already have an appointment scheduled? Yes  Select Scheduled Date? 2021-08-17  Select Scheduled Physician? Additional Information for Provider?   ---------------------------------------------------------------------------  --------------  CALL BACK INFO  What is the best way for the office to contact you? OK to leave message on   voicemail  Preferred Call Back Phone Number?  [phone]

## 2021-08-17 ENCOUNTER — OFFICE VISIT (OUTPATIENT)
Dept: FAMILY MEDICINE CLINIC | Age: 61
End: 2021-08-17
Payer: MEDICAID

## 2021-08-17 VITALS
OXYGEN SATURATION: 97 % | BODY MASS INDEX: 20.82 KG/M2 | SYSTOLIC BLOOD PRESSURE: 110 MMHG | WEIGHT: 141 LBS | DIASTOLIC BLOOD PRESSURE: 70 MMHG | HEART RATE: 82 BPM

## 2021-08-17 DIAGNOSIS — E53.8 B12 DEFICIENCY: ICD-10-CM

## 2021-08-17 DIAGNOSIS — J44.1 COPD EXACERBATION (HCC): Primary | ICD-10-CM

## 2021-08-17 PROCEDURE — 99214 OFFICE O/P EST MOD 30 MIN: CPT | Performed by: FAMILY MEDICINE

## 2021-08-17 PROCEDURE — 3017F COLORECTAL CA SCREEN DOC REV: CPT | Performed by: FAMILY MEDICINE

## 2021-08-17 PROCEDURE — 4004F PT TOBACCO SCREEN RCVD TLK: CPT | Performed by: FAMILY MEDICINE

## 2021-08-17 PROCEDURE — G8926 SPIRO NO PERF OR DOC: HCPCS | Performed by: FAMILY MEDICINE

## 2021-08-17 PROCEDURE — G8427 DOCREV CUR MEDS BY ELIG CLIN: HCPCS | Performed by: FAMILY MEDICINE

## 2021-08-17 PROCEDURE — G8420 CALC BMI NORM PARAMETERS: HCPCS | Performed by: FAMILY MEDICINE

## 2021-08-17 PROCEDURE — 3023F SPIROM DOC REV: CPT | Performed by: FAMILY MEDICINE

## 2021-08-17 RX ORDER — DOXYCYCLINE HYCLATE 100 MG
100 TABLET ORAL 2 TIMES DAILY
Qty: 20 TABLET | Refills: 0 | Status: SHIPPED | OUTPATIENT
Start: 2021-08-17 | End: 2021-08-27

## 2021-08-17 RX ORDER — METHYLPREDNISOLONE 4 MG/1
TABLET ORAL
Qty: 1 KIT | Refills: 0 | Status: SHIPPED | OUTPATIENT
Start: 2021-08-17 | End: 2021-10-12

## 2021-08-17 RX ORDER — CYANOCOBALAMIN 1000 UG/ML
1000 INJECTION INTRAMUSCULAR; SUBCUTANEOUS ONCE
Status: COMPLETED | OUTPATIENT
Start: 2021-08-17 | End: 2021-08-17

## 2021-08-17 RX ADMIN — CYANOCOBALAMIN 1000 MCG: 1000 INJECTION INTRAMUSCULAR; SUBCUTANEOUS at 11:50

## 2021-08-17 ASSESSMENT — PATIENT HEALTH QUESTIONNAIRE - PHQ9
SUM OF ALL RESPONSES TO PHQ QUESTIONS 1-9: 2
1. LITTLE INTEREST OR PLEASURE IN DOING THINGS: 1
SUM OF ALL RESPONSES TO PHQ QUESTIONS 1-9: 2
SUM OF ALL RESPONSES TO PHQ QUESTIONS 1-9: 2
SUM OF ALL RESPONSES TO PHQ9 QUESTIONS 1 & 2: 2
2. FEELING DOWN, DEPRESSED OR HOPELESS: 1

## 2021-08-17 ASSESSMENT — ENCOUNTER SYMPTOMS: SHORTNESS OF BREATH: 1

## 2021-08-17 NOTE — LETTER
[address]  Phone: [phone]  Fax: [phone]    Lis Khan MD        August 17, 2021     Patient: Tres Escalera   YOB: 1960   Date of Visit: 8/17/2021       To Whom it May Concern:    Grant Pierre was seen in my clinic on 8/17/2021. He may return to work on 8/18/2021. He does not have Covid. If you have any questions or concerns, please don't hesitate to call.     Sincerely,         Lis Khan MD

## 2021-08-17 NOTE — PROGRESS NOTES
Gerson Gruber (:  1960) is a 59 y.o. male,Established patient, here for evaluation of the following chief complaint(s):  Sinusitis (coughing, ear problem, states he went to his sister catrina and found out she had COVID 19 so not sure if that's what he has, x3 weeks ago ) and Shortness of Breath (when working)         ASSESSMENT/PLAN:  1. B12 deficiency  -     cyanocobalamin injection 1,000 mcg; 1,000 mcg, Intramuscular, ONCE, On 21 at 1200, For 1 dose  2. COPD exacerbation (HCC)  -     doxycycline hyclate (VIBRA-TABS) 100 MG tablet; Take 1 tablet by mouth 2 times daily for 10 days, Disp-20 tablet, R-0Normal  -     methylPREDNISolone (MEDROL, JENNIFER,) 4 MG tablet; Take by mouth as directed, Disp-1 kit, R-0Normal      No follow-ups on file. Subjective   SUBJECTIVE/OBJECTIVE:  Gerson Gruber is a 59 y.o. male. Patient presents with:  Sinusitis: coughing, ear problem, states he went to his sister catrina and found out she had COVID 19 so not sure if that's what he has, x3 weeks ago   Shortness of Breath: when working      This is a 53-year-old male with COPD who is having increased coughing and shortness of breath recently. He is also had some sinus tenderness and sinus drainage. Patient notes that he has no fevers. He has no loss of taste or smell. Patient has been feeling somewhat fatigued. He denies any significant chills or rigors. Patient has no nausea vomiting or diarrhea. The patients PMH, surgical history, family history, medications, allergies were all reviewed and updated as appropriate today. Sinusitis  Associated symptoms include shortness of breath. Shortness of Breath        Review of Systems   Respiratory: Positive for shortness of breath. Objective   Physical Exam  Vitals and nursing note reviewed. Constitutional:       Appearance: Normal appearance. He is well-developed. HENT:      Head: Normocephalic and atraumatic.       Right Ear: External ear normal.      Left Ear: External ear normal.      Nose: Nose normal.   Eyes:      Conjunctiva/sclera: Conjunctivae normal.      Pupils: Pupils are equal, round, and reactive to light. Cardiovascular:      Rate and Rhythm: Normal rate and regular rhythm. Heart sounds: Normal heart sounds. No murmur heard. No friction rub. No gallop. Pulmonary:      Effort: Pulmonary effort is normal. No respiratory distress. Breath sounds: Normal breath sounds. No wheezing. Abdominal:      General: Bowel sounds are normal. There is no distension. Palpations: Abdomen is soft. Tenderness: There is no abdominal tenderness. Musculoskeletal:         General: No tenderness. Normal range of motion. Cervical back: Normal range of motion and neck supple. Skin:     General: Skin is warm and dry. Neurological:      Mental Status: He is alert and oriented to person, place, and time. Deep Tendon Reflexes: Reflexes are normal and symmetric. Psychiatric:         Behavior: Behavior normal.         Thought Content: Thought content normal.         Judgment: Judgment normal.              An electronic signature was used to authenticate this note.     --Ivory Leyva MD

## 2021-09-20 ENCOUNTER — TELEPHONE (OUTPATIENT)
Dept: FAMILY MEDICINE CLINIC | Age: 61
End: 2021-09-20

## 2021-09-20 NOTE — TELEPHONE ENCOUNTER
Received call from patient stating that he is still experiencing sinus pressure, sinus drainage and some fatigue. Pt stated that the medicine he was given isn't helping. Pt states that when he woke up this morning he found blood on his pillow and was experiencing a bloody nose. Pt states that he has no fever or chills or body aches.        Please advise

## 2021-10-04 ENCOUNTER — NURSE ONLY (OUTPATIENT)
Dept: FAMILY MEDICINE CLINIC | Age: 61
End: 2021-10-04
Payer: MEDICAID

## 2021-10-04 DIAGNOSIS — E53.8 B12 DEFICIENCY: Primary | ICD-10-CM

## 2021-10-04 PROCEDURE — 96372 THER/PROPH/DIAG INJ SC/IM: CPT | Performed by: FAMILY MEDICINE

## 2021-10-04 RX ORDER — CYANOCOBALAMIN 1000 UG/ML
1000 INJECTION INTRAMUSCULAR; SUBCUTANEOUS ONCE
Status: COMPLETED | OUTPATIENT
Start: 2021-10-04 | End: 2021-10-04

## 2021-10-04 RX ADMIN — CYANOCOBALAMIN 1000 MCG: 1000 INJECTION INTRAMUSCULAR; SUBCUTANEOUS at 09:53

## 2021-10-07 DIAGNOSIS — F32.A DEPRESSION, UNSPECIFIED DEPRESSION TYPE: ICD-10-CM

## 2021-10-07 RX ORDER — FLUOXETINE HYDROCHLORIDE 20 MG/1
CAPSULE ORAL
Qty: 30 CAPSULE | Refills: 5 | Status: SHIPPED | OUTPATIENT
Start: 2021-10-07 | End: 2022-03-23 | Stop reason: SDUPTHER

## 2021-10-11 ENCOUNTER — TELEPHONE (OUTPATIENT)
Dept: FAMILY MEDICINE CLINIC | Age: 61
End: 2021-10-11

## 2021-10-12 ENCOUNTER — VIRTUAL VISIT (OUTPATIENT)
Dept: FAMILY MEDICINE CLINIC | Age: 61
End: 2021-10-12
Payer: MEDICAID

## 2021-10-12 DIAGNOSIS — J30.2 SEASONAL ALLERGIES: ICD-10-CM

## 2021-10-12 DIAGNOSIS — J44.1 COPD EXACERBATION (HCC): Primary | ICD-10-CM

## 2021-10-12 PROCEDURE — 3023F SPIROM DOC REV: CPT | Performed by: FAMILY MEDICINE

## 2021-10-12 PROCEDURE — G8420 CALC BMI NORM PARAMETERS: HCPCS | Performed by: FAMILY MEDICINE

## 2021-10-12 PROCEDURE — G8427 DOCREV CUR MEDS BY ELIG CLIN: HCPCS | Performed by: FAMILY MEDICINE

## 2021-10-12 PROCEDURE — G8926 SPIRO NO PERF OR DOC: HCPCS | Performed by: FAMILY MEDICINE

## 2021-10-12 PROCEDURE — 99214 OFFICE O/P EST MOD 30 MIN: CPT | Performed by: FAMILY MEDICINE

## 2021-10-12 PROCEDURE — 3017F COLORECTAL CA SCREEN DOC REV: CPT | Performed by: FAMILY MEDICINE

## 2021-10-12 PROCEDURE — G8484 FLU IMMUNIZE NO ADMIN: HCPCS | Performed by: FAMILY MEDICINE

## 2021-10-12 PROCEDURE — 4004F PT TOBACCO SCREEN RCVD TLK: CPT | Performed by: FAMILY MEDICINE

## 2021-10-12 RX ORDER — METHYLPREDNISOLONE 4 MG/1
TABLET ORAL
Qty: 1 KIT | Refills: 0 | Status: SHIPPED | OUTPATIENT
Start: 2021-10-12 | End: 2022-01-04 | Stop reason: ALTCHOICE

## 2021-10-12 RX ORDER — CETIRIZINE HYDROCHLORIDE 10 MG/1
10 TABLET ORAL EVERY EVENING
Qty: 30 TABLET | Refills: 0 | Status: SHIPPED | OUTPATIENT
Start: 2021-10-12 | End: 2021-11-11

## 2021-10-12 RX ORDER — FLUTICASONE PROPIONATE 50 MCG
2 SPRAY, SUSPENSION (ML) NASAL DAILY
Qty: 16 G | Refills: 5 | Status: SHIPPED | OUTPATIENT
Start: 2021-10-12 | End: 2022-03-18 | Stop reason: SDUPTHER

## 2021-10-12 RX ORDER — ALBUTEROL SULFATE 90 UG/1
2 AEROSOL, METERED RESPIRATORY (INHALATION) 4 TIMES DAILY PRN
Qty: 18 G | Refills: 5 | Status: SHIPPED | OUTPATIENT
Start: 2021-10-12

## 2021-10-12 RX ORDER — AMOXICILLIN AND CLAVULANATE POTASSIUM 875; 125 MG/1; MG/1
1 TABLET, FILM COATED ORAL 2 TIMES DAILY
Qty: 20 TABLET | Refills: 0 | Status: SHIPPED | OUTPATIENT
Start: 2021-10-12 | End: 2021-10-22

## 2021-10-12 NOTE — PROGRESS NOTES
Ton Key (:  1960) is a 59 y.o. male,Established patient, here for evaluation of the following chief complaint(s): Cough and Other (runny nose )         ASSESSMENT/PLAN:  1. COPD exacerbation (HCC)  -     amoxicillin-clavulanate (AUGMENTIN) 875-125 MG per tablet; Take 1 tablet by mouth 2 times daily for 10 days, Disp-20 tablet, R-0Normal  -     methylPREDNISolone (MEDROL, JENNIFER,) 4 MG tablet; Take by mouth as directed, Disp-1 kit, R-0Normal  -     albuterol sulfate HFA (VENTOLIN HFA) 108 (90 Base) MCG/ACT inhaler; Inhale 2 puffs into the lungs 4 times daily as needed for Wheezing, Disp-18 g, R-5Normal  2. Seasonal allergies  -     cetirizine (ZYRTEC) 10 MG tablet; Take 1 tablet by mouth every evening, Disp-30 tablet, R-0Normal  -     fluticasone (FLONASE) 50 MCG/ACT nasal spray; 2 sprays by Each Nostril route daily, Disp-16 g, R-5Normal      No follow-ups on file. SUBJECTIVE/OBJECTIVE:  Ton Key is a 59 y.o. male. Patient presents with:  Cough  Other: runny nose       Congestion, sneezing, mild cough. Still going on . Short of breath. No production to his cough. No fever. No headaches. NO myalgia. Patient notes worse when he is outside. Worse with activity. Better with rest.  The patients PMH, surgical history, family history, medications, allergies were all reviewed and updated as appropriate today.         Review of Systems    Patient-Reported Vitals 10/12/2021   Patient-Reported Weight 145lb        Physical Exam    [INSTRUCTIONS:  \"[x]\" Indicates a positive item  \"[]\" Indicates a negative item  -- DELETE ALL ITEMS NOT EXAMINED]    Constitutional: [x] Appears well-developed and well-nourished [x] No apparent distress      [] Abnormal -     Mental status: [x] Alert and awake  [x] Oriented to person/place/time [x] Able to follow commands    [] Abnormal -     Eyes:   EOM    [x]  Normal    [] Abnormal -   Sclera  [x]  Normal    [] Abnormal -          Discharge [x]  None visible   [] Abnormal -     HENT: [x] Normocephalic, atraumatic  [] Abnormal -   [x] Mouth/Throat: Mucous membranes are moist    External Ears [x] Normal  [] Abnormal -    Neck: [x] No visualized mass [] Abnormal -     Pulmonary/Chest: [x] Respiratory effort normal   [x] No visualized signs of difficulty breathing or respiratory distress        [] Abnormal -      Musculoskeletal:   [x] Normal gait with no signs of ataxia         [x] Normal range of motion of neck        [] Abnormal -     Neurological:        [x] No Facial Asymmetry (Cranial nerve 7 motor function) (limited exam due to video visit)          [x] No gaze palsy        [] Abnormal -          Skin:        [x] No significant exanthematous lesions or discoloration noted on facial skin         [] Abnormal -            Psychiatric:       [x] Normal Affect [] Abnormal -        [x] No Hallucinations    Other pertinent observable physical exam findings:-          On this date 10/12/2021 I have spent 20 minutes reviewing previous notes, test results and face to face (virtual) with the patient discussing the diagnosis and importance of compliance with the treatment plan as well as documenting on the day of the visitBrittany CasanovaClark Ka, was evaluated through a synchronous (real-time) audio-video encounter. The patient (or guardian if applicable) is aware that this is a billable service. Verbal consent to proceed has been obtained within the past 12 months. The visit was conducted pursuant to the emergency declaration under the [facility], [address] [place] [address] authority and the [employer] General Act. Patient identification was verified, and a caregiver was present when appropriate. The patient was located in a state where the provider was credentialed to provide care. An electronic signature was used to authenticate this note.     --Juan Almonte MD

## 2021-11-05 ENCOUNTER — NURSE ONLY (OUTPATIENT)
Dept: FAMILY MEDICINE CLINIC | Age: 61
End: 2021-11-05
Payer: MEDICAID

## 2021-11-05 DIAGNOSIS — E53.8 B12 DEFICIENCY: Primary | ICD-10-CM

## 2021-11-05 PROCEDURE — 96372 THER/PROPH/DIAG INJ SC/IM: CPT | Performed by: FAMILY MEDICINE

## 2021-11-05 RX ORDER — CYANOCOBALAMIN 1000 UG/ML
1000 INJECTION INTRAMUSCULAR; SUBCUTANEOUS ONCE
Status: COMPLETED | OUTPATIENT
Start: 2021-11-05 | End: 2021-11-05

## 2021-12-30 ENCOUNTER — TELEPHONE (OUTPATIENT)
Dept: FAMILY MEDICINE CLINIC | Age: 61
End: 2021-12-30

## 2021-12-30 NOTE — TELEPHONE ENCOUNTER
----- Message from Gerber Farooq sent at 12/30/2021  5:08 PM EST -----  Subject: Message to Provider    QUESTIONS  Information for Provider? patient is calling in asking if possible he can   get pain medication for his legs he stated every time he try's to relax   but it keeps jumping in both legs his pharmacy is Pharmacy: [id number], [address] if can please advise he stated he has   not be able to sleep   ---------------------------------------------------------------------------  --------------  CALL BACK INFO  What is the best way for the office to contact you? OK to leave message on   voicemail  Preferred Call Back Phone Number? [phone]  ---------------------------------------------------------------------------  --------------  SCRIPT ANSWERS  Relationship to Patient?  Self

## 2021-12-30 NOTE — TELEPHONE ENCOUNTER
Please advise patient to make an appointment to follow-up for this issue. He'll need to be evaluated.

## 2022-01-04 ENCOUNTER — OFFICE VISIT (OUTPATIENT)
Dept: FAMILY MEDICINE CLINIC | Age: 62
End: 2022-01-04
Payer: MEDICAID

## 2022-01-04 VITALS
TEMPERATURE: 97.6 F | WEIGHT: 151 LBS | DIASTOLIC BLOOD PRESSURE: 62 MMHG | BODY MASS INDEX: 22.36 KG/M2 | SYSTOLIC BLOOD PRESSURE: 102 MMHG | HEIGHT: 69 IN | HEART RATE: 85 BPM | OXYGEN SATURATION: 98 %

## 2022-01-04 DIAGNOSIS — G25.81 RLS (RESTLESS LEGS SYNDROME): ICD-10-CM

## 2022-01-04 DIAGNOSIS — K90.89 BILE SALT-INDUCED DIARRHEA: Primary | ICD-10-CM

## 2022-01-04 DIAGNOSIS — E53.8 B12 DEFICIENCY: ICD-10-CM

## 2022-01-04 PROCEDURE — G8484 FLU IMMUNIZE NO ADMIN: HCPCS | Performed by: FAMILY MEDICINE

## 2022-01-04 PROCEDURE — G8420 CALC BMI NORM PARAMETERS: HCPCS | Performed by: FAMILY MEDICINE

## 2022-01-04 PROCEDURE — 4004F PT TOBACCO SCREEN RCVD TLK: CPT | Performed by: FAMILY MEDICINE

## 2022-01-04 PROCEDURE — 99214 OFFICE O/P EST MOD 30 MIN: CPT | Performed by: FAMILY MEDICINE

## 2022-01-04 PROCEDURE — G8427 DOCREV CUR MEDS BY ELIG CLIN: HCPCS | Performed by: FAMILY MEDICINE

## 2022-01-04 PROCEDURE — 3017F COLORECTAL CA SCREEN DOC REV: CPT | Performed by: FAMILY MEDICINE

## 2022-01-04 RX ORDER — GABAPENTIN 300 MG/1
300 CAPSULE ORAL NIGHTLY
Qty: 30 CAPSULE | Refills: 5 | Status: SHIPPED | OUTPATIENT
Start: 2022-01-04 | End: 2022-03-18 | Stop reason: SDUPTHER

## 2022-01-04 RX ORDER — MONTELUKAST SODIUM 4 MG/1
1 TABLET, CHEWABLE ORAL 2 TIMES DAILY
Qty: 60 TABLET | Refills: 3 | Status: SHIPPED | OUTPATIENT
Start: 2022-01-04 | End: 2022-03-18 | Stop reason: SDUPTHER

## 2022-01-04 RX ORDER — CYANOCOBALAMIN 1000 UG/ML
1000 INJECTION INTRAMUSCULAR; SUBCUTANEOUS ONCE
Status: COMPLETED | OUTPATIENT
Start: 2022-01-04 | End: 2022-01-04

## 2022-01-04 RX ADMIN — CYANOCOBALAMIN 1000 MCG: 1000 INJECTION INTRAMUSCULAR; SUBCUTANEOUS at 12:10

## 2022-01-05 NOTE — PROGRESS NOTES
Niki Cohen (:  1960) is a 59 y.o. male,Established patient, here for evaluation of the following chief complaint(s):  Leg Pain (bilateral) and Injections (b12)         ASSESSMENT/PLAN:  1. Bile salt-induced diarrhea  -     colestipol (COLESTID) 1 g tablet; Take 1 tablet by mouth 2 times daily, Disp-60 tablet, R-3Normal  2. RLS (restless legs syndrome)  -     gabapentin (NEURONTIN) 300 MG capsule; Take 1 capsule by mouth nightly for 180 days. Intended supply: 30 days, Disp-30 capsule, R-5Normal  3. B12 deficiency  -     cyanocobalamin injection 1,000 mcg; 1,000 mcg, IntraMUSCular, ONCE, On 22 at 1230, For 1 dose      No follow-ups on file. Subjective   SUBJECTIVE/OBJECTIVE:  Niki Cohen is a 59 y.o. male. Patient presents with:  Leg Pain: bilateral  Injections: b15     68-year-old male presents for follow-up of conditions. 1.  Deficiency. Patient needs B12 injections today. 2.  Bilateral leg pains and restless legs. This work-up has been wearing night. He feels cramping and restless legs and evening are worse. He is and this is because someone called him sleep. 3.  Patient has gallbladder out any significant neck pain. Notes that this is been going on since he moved. The patients PMH, surgical history, family history, medications, allergies were all reviewed and updated as appropriate today. Leg Pain         Review of Systems       Objective   Physical Exam  Vitals and nursing note reviewed. Constitutional:       Appearance: Normal appearance. He is well-developed. HENT:      Head: Normocephalic and atraumatic. Right Ear: External ear normal.      Left Ear: External ear normal.      Nose: Nose normal.   Eyes:      Conjunctiva/sclera: Conjunctivae normal.      Pupils: Pupils are equal, round, and reactive to light. Cardiovascular:      Rate and Rhythm: Normal rate and regular rhythm. Heart sounds: Normal heart sounds. No murmur heard.   No friction rub. No gallop. Pulmonary:      Effort: Pulmonary effort is normal. No respiratory distress. Breath sounds: Normal breath sounds. No wheezing. Abdominal:      General: Bowel sounds are normal. There is no distension. Palpations: Abdomen is soft. Tenderness: There is no abdominal tenderness. Musculoskeletal:         General: No tenderness. Normal range of motion. Cervical back: Normal range of motion and neck supple. Skin:     General: Skin is warm and dry. Neurological:      Mental Status: He is alert and oriented to person, place, and time. Deep Tendon Reflexes: Reflexes are normal and symmetric. Psychiatric:         Behavior: Behavior normal.         Thought Content: Thought content normal.         Judgment: Judgment normal.              An electronic signature was used to authenticate this note.     --Leona Ferrari MD

## 2022-03-18 DIAGNOSIS — G25.81 RLS (RESTLESS LEGS SYNDROME): ICD-10-CM

## 2022-03-18 DIAGNOSIS — J30.2 SEASONAL ALLERGIES: ICD-10-CM

## 2022-03-18 DIAGNOSIS — K90.89 BILE SALT-INDUCED DIARRHEA: ICD-10-CM

## 2022-03-18 RX ORDER — FLUTICASONE PROPIONATE 50 MCG
2 SPRAY, SUSPENSION (ML) NASAL DAILY
Qty: 16 G | Refills: 5 | Status: SHIPPED | OUTPATIENT
Start: 2022-03-18 | End: 2022-03-22 | Stop reason: SDUPTHER

## 2022-03-18 RX ORDER — MONTELUKAST SODIUM 4 MG/1
1 TABLET, CHEWABLE ORAL 2 TIMES DAILY
Qty: 60 TABLET | Refills: 3 | Status: SHIPPED | OUTPATIENT
Start: 2022-03-18 | End: 2022-03-23 | Stop reason: SDUPTHER

## 2022-03-18 RX ORDER — GABAPENTIN 300 MG/1
300 CAPSULE ORAL NIGHTLY
Qty: 30 CAPSULE | Refills: 5 | Status: SHIPPED | OUTPATIENT
Start: 2022-03-18 | End: 2022-03-21

## 2022-03-18 NOTE — TELEPHONE ENCOUNTER
Last Office Visit  -  1-4-2022  Next Office Visit  -  3-    Last Filled  -    Last UDS -    Contract -      Refill colestipol (COLESTID) 1 g tablet    Refill gabapentin (NEURONTIN) 300 MG capsule    Refill fluticasone (FLONASE) 50 MCG/ACT nasal spray

## 2022-03-21 ENCOUNTER — OFFICE VISIT (OUTPATIENT)
Dept: FAMILY MEDICINE CLINIC | Age: 62
End: 2022-03-21
Payer: MEDICAID

## 2022-03-21 VITALS
DIASTOLIC BLOOD PRESSURE: 70 MMHG | WEIGHT: 152 LBS | BODY MASS INDEX: 22.51 KG/M2 | OXYGEN SATURATION: 99 % | HEART RATE: 80 BPM | SYSTOLIC BLOOD PRESSURE: 128 MMHG | HEIGHT: 69 IN

## 2022-03-21 DIAGNOSIS — Z12.11 SCREENING FOR COLON CANCER: ICD-10-CM

## 2022-03-21 DIAGNOSIS — J30.9 ALLERGIC RHINITIS, UNSPECIFIED SEASONALITY, UNSPECIFIED TRIGGER: ICD-10-CM

## 2022-03-21 DIAGNOSIS — Z00.00 ENCOUNTER FOR WELL ADULT EXAM WITHOUT ABNORMAL FINDINGS: Primary | ICD-10-CM

## 2022-03-21 DIAGNOSIS — E53.8 B12 DEFICIENCY: ICD-10-CM

## 2022-03-21 DIAGNOSIS — G25.81 RESTLESS LEGS: ICD-10-CM

## 2022-03-21 DIAGNOSIS — E11.9 TYPE 2 DIABETES MELLITUS WITHOUT COMPLICATION, WITHOUT LONG-TERM CURRENT USE OF INSULIN (HCC): ICD-10-CM

## 2022-03-21 LAB
A/G RATIO: 2.4 (ref 1.1–2.2)
ALBUMIN SERPL-MCNC: 4.7 G/DL (ref 3.4–5)
ALP BLD-CCNC: 126 U/L (ref 40–129)
ALT SERPL-CCNC: 27 U/L (ref 10–40)
ANION GAP SERPL CALCULATED.3IONS-SCNC: 14 MMOL/L (ref 3–16)
AST SERPL-CCNC: 19 U/L (ref 15–37)
BILIRUB SERPL-MCNC: <0.2 MG/DL (ref 0–1)
BUN BLDV-MCNC: 13 MG/DL (ref 7–20)
CALCIUM SERPL-MCNC: 9.7 MG/DL (ref 8.3–10.6)
CHLORIDE BLD-SCNC: 101 MMOL/L (ref 99–110)
CHOLESTEROL, FASTING: 155 MG/DL (ref 0–199)
CO2: 25 MMOL/L (ref 21–32)
CREAT SERPL-MCNC: 0.7 MG/DL (ref 0.8–1.3)
CREATININE URINE: 206.3 MG/DL (ref 39–259)
GFR AFRICAN AMERICAN: >60
GFR NON-AFRICAN AMERICAN: >60
GLUCOSE BLD-MCNC: 152 MG/DL (ref 70–99)
HBA1C MFR BLD: 6.1 %
HDLC SERPL-MCNC: 28 MG/DL (ref 40–60)
LDL CHOLESTEROL CALCULATED: 82 MG/DL
MICROALBUMIN UR-MCNC: <1.2 MG/DL
MICROALBUMIN/CREAT UR-RTO: NORMAL MG/G (ref 0–30)
POTASSIUM SERPL-SCNC: 4.1 MMOL/L (ref 3.5–5.1)
SODIUM BLD-SCNC: 140 MMOL/L (ref 136–145)
TOTAL PROTEIN: 6.7 G/DL (ref 6.4–8.2)
TRIGLYCERIDE, FASTING: 227 MG/DL (ref 0–150)
VLDLC SERPL CALC-MCNC: 45 MG/DL

## 2022-03-21 PROCEDURE — 99396 PREV VISIT EST AGE 40-64: CPT | Performed by: FAMILY MEDICINE

## 2022-03-21 PROCEDURE — 36415 COLL VENOUS BLD VENIPUNCTURE: CPT | Performed by: FAMILY MEDICINE

## 2022-03-21 PROCEDURE — 3017F COLORECTAL CA SCREEN DOC REV: CPT | Performed by: FAMILY MEDICINE

## 2022-03-21 PROCEDURE — 83036 HEMOGLOBIN GLYCOSYLATED A1C: CPT | Performed by: FAMILY MEDICINE

## 2022-03-21 PROCEDURE — 2022F DILAT RTA XM EVC RTNOPTHY: CPT | Performed by: FAMILY MEDICINE

## 2022-03-21 PROCEDURE — 4004F PT TOBACCO SCREEN RCVD TLK: CPT | Performed by: FAMILY MEDICINE

## 2022-03-21 PROCEDURE — 3044F HG A1C LEVEL LT 7.0%: CPT | Performed by: FAMILY MEDICINE

## 2022-03-21 PROCEDURE — G8420 CALC BMI NORM PARAMETERS: HCPCS | Performed by: FAMILY MEDICINE

## 2022-03-21 PROCEDURE — G8427 DOCREV CUR MEDS BY ELIG CLIN: HCPCS | Performed by: FAMILY MEDICINE

## 2022-03-21 PROCEDURE — G8484 FLU IMMUNIZE NO ADMIN: HCPCS | Performed by: FAMILY MEDICINE

## 2022-03-21 PROCEDURE — 99213 OFFICE O/P EST LOW 20 MIN: CPT | Performed by: FAMILY MEDICINE

## 2022-03-21 RX ORDER — PRAMIPEXOLE DIHYDROCHLORIDE 0.12 MG/1
0.12 TABLET ORAL NIGHTLY
Qty: 90 TABLET | Refills: 3 | Status: SHIPPED | OUTPATIENT
Start: 2022-03-21 | End: 2022-03-24 | Stop reason: SDUPTHER

## 2022-03-21 RX ORDER — MONTELUKAST SODIUM 10 MG/1
10 TABLET ORAL DAILY
Qty: 30 TABLET | Refills: 3 | Status: SHIPPED | OUTPATIENT
Start: 2022-03-21

## 2022-03-21 RX ORDER — CYANOCOBALAMIN 1000 UG/ML
1000 INJECTION INTRAMUSCULAR; SUBCUTANEOUS ONCE
Status: COMPLETED | OUTPATIENT
Start: 2022-03-21 | End: 2022-03-21

## 2022-03-21 RX ADMIN — CYANOCOBALAMIN 1000 MCG: 1000 INJECTION INTRAMUSCULAR; SUBCUTANEOUS at 08:39

## 2022-03-21 ASSESSMENT — ENCOUNTER SYMPTOMS
BACK PAIN: 0
DIARRHEA: 0
CONSTIPATION: 0
RHINORRHEA: 1
CHEST TIGHTNESS: 0
COLOR CHANGE: 0
EYE PAIN: 0
TROUBLE SWALLOWING: 0
SHORTNESS OF BREATH: 1

## 2022-03-21 NOTE — PROGRESS NOTES
Well Adult Note  Name: Khushbu Carter Date: 3/21/2022   MRN: [id number] Sex: Male   Age: 59 y.o. Ethnicity: Non- / Non    : 1960 Race: White (non-)      Paco Carrera is here for well adult exam.  History:  Patient presents for complete physical exam but he also has acute complaints today. Acute issues:  1. Leg pains persist at night. Notes that it is worse at night. Patient feels that this makes him feel that he has to move his legs at night. Patient did have a brief episode recently of having right leg feeling \"go out\" and almost gave out on him. Patient has been taking gabapentin and found this not to be helpful. Does feel that his legs want to move all night. 2.  \"sinus\"  Patient has chronic nasal drainage, and sneezing. Always feels that his ears are popping. Patient notes that this is despite using his fluticasone on a routine basis. 3.  Type 2 diabetes mellitus. Patient has been controlling his diet. Notes that he has been exercising not very frequently. Notes that he is fairly sedentary. Has noted that his control consists of avoiding sweets. Review of Systems   Constitutional: Negative for fatigue and unexpected weight change. HENT: Positive for rhinorrhea. Negative for congestion and trouble swallowing. Eyes: Negative for pain and visual disturbance. Respiratory: Positive for shortness of breath. Negative for chest tightness. Cardiovascular: Negative for chest pain and palpitations. Gastrointestinal: Negative for constipation and diarrhea. Endocrine: Negative for cold intolerance and heat intolerance. Genitourinary: Negative for frequency and urgency. Musculoskeletal: Negative for arthralgias and back pain. Skin: Negative for color change and rash. Allergic/Immunologic: Negative for environmental allergies and food allergies. Neurological: Negative for dizziness and light-headedness.    Hematological: Negative for adenopathy. Does not bruise/bleed easily. Psychiatric/Behavioral: Negative for dysphoric mood and sleep disturbance. No Known Allergies      Prior to Visit Medications    Medication Sig Taking? Authorizing Provider   pramipexole (MIRAPEX) 0.125 MG tablet Take 1 tablet by mouth nightly Yes Ned Nichole MD   montelukast (SINGULAIR) 10 MG tablet Take 1 tablet by mouth daily Yes Ned Nichole MD   colestipol (COLESTID) 1 g tablet Take 1 tablet by mouth 2 times daily Yes Ned Nichole MD   fluticasone (FLONASE) 50 MCG/ACT nasal spray 2 sprays by Each Nostril route daily Yes Ned Nichole MD   albuterol sulfate HFA (VENTOLIN HFA) 108 (90 Base) MCG/ACT inhaler Inhale 2 puffs into the lungs 4 times daily as needed for Wheezing Yes Ned Nichole MD   FLUoxetine (PROZAC) 20 MG capsule TAKE 1 CAPSULE BY MOUTH EVERY DAY Yes Ned Nichole MD   sildenafil (VIAGRA) 50 MG tablet Take 1 tablet by mouth daily as needed for Erectile Dysfunction Yes Ned Nichole MD         Past Medical History:   Diagnosis Date    Acid reflux     Cholelithiasis 03/19/2019    Depression     DM [facility]diabetes mellitus) ([facility] [address]     History of left bundle branch block (LBBB)     HX OTHER MEDICAL     \"blood doesn't flow fast enough into heart\"    Wears dentures        Past Surgical History:   Procedure Laterality Date    APPENDECTOMY      CHOLECYSTECTOMY, LAPAROSCOPIC N/A 3/19/2019    LAPAROSCOPIC CHOLECYSTECTOMY WITH INTRAOPERATIVE, CHOLANGIOGRAM performed by Sharonda Clemons MD at [address] [place] [address]           Family History   Problem Relation Age of Onset    Cancer Father         lung    Mult Sclerosis Maternal Aunt        Social History     Tobacco Use    Smoking status: Current Every Day Smoker     Packs/day: 2.00     Years: 46.00     Pack years: 92.00     Types: Cigarettes    Smokeless tobacco: Never Used   Vaping Use    Vaping Use: Never used   Substance Use Topics    Alcohol use: No    Drug use:  No Objective   /70   Pulse 80   Ht 5' 9\" (1.753 m)   Wt 152 lb (68.9 kg)   SpO2 99%   BMI 22.45 kg/m²   Wt Readings from Last 3 Encounters:   03/21/22 152 lb (68.9 kg)   01/04/22 151 lb (68.5 kg)   08/17/21 141 lb (64 kg)     There were no vitals filed for this visit. Physical Exam  Vitals and nursing note reviewed. Constitutional:       Appearance: Normal appearance. He is well-developed. HENT:      Head: Normocephalic and atraumatic. Right Ear: External ear normal.      Left Ear: External ear normal.      Nose: Nose normal.   Eyes:      Conjunctiva/sclera: Conjunctivae normal.      Pupils: Pupils are equal, round, and reactive to light. Cardiovascular:      Rate and Rhythm: Normal rate and regular rhythm. Heart sounds: Normal heart sounds. No murmur heard. No friction rub. No gallop. Pulmonary:      Effort: Pulmonary effort is normal. No respiratory distress. Breath sounds: Normal breath sounds. No wheezing. Abdominal:      General: Bowel sounds are normal. There is no distension. Palpations: Abdomen is soft. Tenderness: There is no abdominal tenderness. Musculoskeletal:         General: No tenderness. Normal range of motion. Cervical back: Normal range of motion and neck supple. Skin:     General: Skin is warm and dry. Neurological:      Mental Status: He is alert and oriented to person, place, and time. Deep Tendon Reflexes: Reflexes are normal and symmetric. Psychiatric:         Behavior: Behavior normal.         Thought Content: Thought content normal.         Judgment: Judgment normal.           Assessment   Plan   1. B12 deficiency  -     cyanocobalamin injection 1,000 mcg; 1,000 mcg, IntraMUSCular, ONCE, On Mon 3/21/22 at 0900, For 1 dose  2.  Type 2 diabetes mellitus without complication, without long-term current use of insulin (HCC)  -     POCT glycosylated hemoglobin (Hb A1C)  -     Microalbumin / Creatinine Urine Ratio  - Comprehensive Metabolic Panel  -     Lipid, Fasting  Check above labs today. 3. Restless legs  -     pramipexole (MIRAPEX) 0.125 MG tablet; Take 1 tablet by mouth nightly, Disp-90 tablet, R-3Normal  DC gabapentin start Mirapex. 4. Allergic rhinitis, unspecified seasonality, unspecified trigger  -     montelukast (SINGULAIR) 10 MG tablet; Take 1 tablet by mouth daily, Disp-30 tablet, R-3Normal  Add Singulair to the regimen consistently help his breathing as well as his allergies. 5. Encounter for well adult exam without abnormal findings  6. Screening for colon cancer         Personalized Preventive Plan   Current Health Maintenance Status  Immunization History   Administered Date(s) Administered    COVID-19, Pfizer Purple top, DILUTE for use, 12+ yrs, 30mcg/0.3mL dose 03/31/2021, 04/21/2021        Health Maintenance   Topic Date Due    Hepatitis C screen  Never done    Pneumococcal 0-64 years Vaccine (1 of 2 - PPSV23) Never done    Diabetic foot exam  Never done    HIV screen  Never done    DTaP/Tdap/Td vaccine (1 - Tdap) Never done    Shingles Vaccine (1 of 2) Never done    Low dose CT lung screening  Never done    Diabetic retinal exam  06/22/2016    Colorectal Cancer Screen  12/23/2016    Diabetic microalbuminuria test  09/18/2018    Lipid screen  09/18/2018    Flu vaccine (1) Never done    COVID-19 Vaccine (3 - Booster for Pfizer series) 09/21/2021    A1C test (Diabetic or Prediabetic)  11/16/2021    Depression Monitoring  08/17/2022    Hepatitis A vaccine  Aged Out    Hib vaccine  Aged Out    Meningococcal (ACWY) vaccine  Aged Out     Recommendations for Edxact Due: see orders and patient instructions/AVS.    No follow-ups on file.

## 2022-03-22 RX ORDER — FLUTICASONE PROPIONATE 50 MCG
2 SPRAY, SUSPENSION (ML) NASAL DAILY
Qty: 16 G | Refills: 5 | Status: SHIPPED | OUTPATIENT
Start: 2022-03-22

## 2022-03-23 ENCOUNTER — TELEPHONE (OUTPATIENT)
Dept: FAMILY MEDICINE CLINIC | Age: 62
End: 2022-03-23

## 2022-03-23 DIAGNOSIS — F32.A DEPRESSION, UNSPECIFIED DEPRESSION TYPE: ICD-10-CM

## 2022-03-23 DIAGNOSIS — G25.81 RLS (RESTLESS LEGS SYNDROME): ICD-10-CM

## 2022-03-23 DIAGNOSIS — K90.89 BILE SALT-INDUCED DIARRHEA: ICD-10-CM

## 2022-03-23 RX ORDER — MONTELUKAST SODIUM 4 MG/1
1 TABLET, CHEWABLE ORAL 2 TIMES DAILY
Qty: 180 TABLET | Refills: 3 | Status: SHIPPED | OUTPATIENT
Start: 2022-03-23

## 2022-03-23 RX ORDER — GABAPENTIN 300 MG/1
300 CAPSULE ORAL NIGHTLY
Qty: 90 CAPSULE | Refills: 2 | Status: SHIPPED | OUTPATIENT
Start: 2022-03-23 | End: 2022-06-22 | Stop reason: SDUPTHER

## 2022-03-23 RX ORDER — FLUOXETINE HYDROCHLORIDE 20 MG/1
CAPSULE ORAL
Qty: 90 CAPSULE | Refills: 3 | Status: SHIPPED | OUTPATIENT
Start: 2022-03-23 | End: 2022-03-28

## 2022-03-23 NOTE — TELEPHONE ENCOUNTER
Received fax from [facility] stating they are a new pharmacy for patient .  They are requesting  FLUoxetine (PROZAC) 20 MG capsule    colestipol (COLESTID) 1 g tablet     Gabapentin    PE 3/21/22    Next office visit   6/21/2022

## 2022-03-24 ENCOUNTER — TELEPHONE (OUTPATIENT)
Dept: FAMILY MEDICINE CLINIC | Age: 62
End: 2022-03-24

## 2022-03-24 DIAGNOSIS — G25.81 RESTLESS LEGS: ICD-10-CM

## 2022-03-24 RX ORDER — PRAMIPEXOLE DIHYDROCHLORIDE 0.12 MG/1
0.12 TABLET ORAL NIGHTLY
Qty: 90 TABLET | Refills: 3 | Status: SHIPPED | OUTPATIENT
Start: 2022-03-24 | End: 2022-10-17 | Stop reason: SDUPTHER

## 2022-03-24 NOTE — TELEPHONE ENCOUNTER
Received call from Pt requesting that pramipexole (MIRAPEX) 0.125 MG tablet be sent to [address] [place] [address] Main Visit  -   3/21/22  Next Office Visit  - 6/21/22

## 2022-03-24 NOTE — TELEPHONE ENCOUNTER
Pt requesting Call Back with Cologuard instruction. Pt states he received in the mail the Cologuard package.

## 2022-03-24 NOTE — TELEPHONE ENCOUNTER
Received call from Evan Linton. He is requesting clarification on the directions for Gabapentin. Is it a 90 or 30 day supply?   [phone] Improved

## 2022-03-27 DIAGNOSIS — F32.A DEPRESSION, UNSPECIFIED DEPRESSION TYPE: ICD-10-CM

## 2022-03-28 ENCOUNTER — TELEPHONE (OUTPATIENT)
Dept: FAMILY MEDICINE CLINIC | Age: 62
End: 2022-03-28

## 2022-03-28 DIAGNOSIS — R19.5 POSITIVE COLORECTAL CANCER SCREENING USING COLOGUARD TEST: Primary | ICD-10-CM

## 2022-03-28 RX ORDER — FLUOXETINE HYDROCHLORIDE 20 MG/1
CAPSULE ORAL
Qty: 30 CAPSULE | Refills: 5 | Status: SHIPPED | OUTPATIENT
Start: 2022-03-28 | End: 2022-03-30 | Stop reason: SDUPTHER

## 2022-03-28 NOTE — TELEPHONE ENCOUNTER
----- Message from Cy Morris sent at 3/28/2022  1:26 PM EDT -----  Subject: Results Request    QUESTIONS  Which lab or imaging result is the patient calling about? Fecal Analysis   Which provider ordered the test? Michelle Morales   At what location was the test performed? Date the test was performed? 2022-03-21  Additional Information for Provider?   ---------------------------------------------------------------------------  --------------  CALL BACK INFO  What is the best way for the office to contact you? OK to leave message on   voicemail  Preferred Call Back Phone Number?  [phone]

## 2022-03-30 LAB — NONINV COLON CA DNA+OCC BLD SCRN STL QL: POSITIVE

## 2022-03-30 RX ORDER — FLUOXETINE HYDROCHLORIDE 20 MG/1
CAPSULE ORAL
Qty: 30 CAPSULE | Refills: 5 | Status: SHIPPED | OUTPATIENT
Start: 2022-03-30 | End: 2022-09-09 | Stop reason: SDUPTHER

## 2022-03-30 NOTE — TELEPHONE ENCOUNTER
Pt states [facility] did not receive  E-script,  Please RE-Send. FLUoxetine (PROZAC) 20 MG capsule     [id number]- [place], [place] - [address] [phone] - F [phone]

## 2022-04-11 ENCOUNTER — TELEPHONE (OUTPATIENT)
Dept: FAMILY MEDICINE CLINIC | Age: 62
End: 2022-04-11

## 2022-04-11 NOTE — TELEPHONE ENCOUNTER
4/11/22 @ 1:20pm- Pt was given Cologuard results & PCP message about the result. Please Call Pt back when Colonoscopy Referral is ready.

## 2022-04-14 ENCOUNTER — TELEPHONE (OUTPATIENT)
Dept: FAMILY MEDICINE CLINIC | Age: 62
End: 2022-04-14

## 2022-04-14 DIAGNOSIS — R19.5 POSITIVE COLORECTAL CANCER SCREENING USING COLOGUARD TEST: Primary | ICD-10-CM

## 2022-04-14 NOTE — TELEPHONE ENCOUNTER
Received call from Pt stating that he tried to schedule an appt with the gastroenterologist that was referred and was told that they are booked up pretty far.  Pt is requesting a referral for a different gastroenterologist.     Please advise

## 2022-04-14 NOTE — TELEPHONE ENCOUNTER
-Pt was given PCP message to schedule with any provider in group.  -Pt states the entire group has no openings available. -Please Advise for more options.

## 2022-04-17 ENCOUNTER — APPOINTMENT (OUTPATIENT)
Dept: CT IMAGING | Age: 62
End: 2022-04-17
Payer: MEDICAID

## 2022-04-17 ENCOUNTER — HOSPITAL ENCOUNTER (EMERGENCY)
Age: 62
Discharge: HOME | End: 2022-04-17
Payer: MEDICAID

## 2022-04-17 VITALS
HEIGHT: 69 IN | HEART RATE: 67 BPM | OXYGEN SATURATION: 99 % | RESPIRATION RATE: 16 BRPM | SYSTOLIC BLOOD PRESSURE: 133 MMHG | WEIGHT: 148 LBS | BODY MASS INDEX: 21.92 KG/M2 | TEMPERATURE: 98.5 F | DIASTOLIC BLOOD PRESSURE: 79 MMHG

## 2022-04-17 DIAGNOSIS — R10.84 GENERALIZED ABDOMINAL PAIN: Primary | ICD-10-CM

## 2022-04-17 LAB
A/G RATIO: 2.2 (ref 1.1–2.2)
ALBUMIN SERPL-MCNC: 4.9 G/DL (ref 3.4–5)
ALP BLD-CCNC: 123 U/L (ref 40–129)
ALT SERPL-CCNC: 31 U/L (ref 10–40)
ANION GAP SERPL CALCULATED.3IONS-SCNC: 10 MMOL/L (ref 3–16)
AST SERPL-CCNC: 20 U/L (ref 15–37)
BASOPHILS ABSOLUTE: 0.1 K/UL (ref 0–0.2)
BASOPHILS RELATIVE PERCENT: 1.3 %
BILIRUB SERPL-MCNC: <0.2 MG/DL (ref 0–1)
BUN BLDV-MCNC: 12 MG/DL (ref 7–20)
CALCIUM SERPL-MCNC: 9.6 MG/DL (ref 8.3–10.6)
CHLORIDE BLD-SCNC: 98 MMOL/L (ref 99–110)
CO2: 27 MMOL/L (ref 21–32)
CREAT SERPL-MCNC: 0.7 MG/DL (ref 0.8–1.3)
EOSINOPHILS ABSOLUTE: 0.4 K/UL (ref 0–0.6)
EOSINOPHILS RELATIVE PERCENT: 4.3 %
GFR AFRICAN AMERICAN: >60
GFR NON-AFRICAN AMERICAN: >60
GLUCOSE BLD-MCNC: 133 MG/DL (ref 70–99)
HCT VFR BLD CALC: 42.5 % (ref 40.5–52.5)
HEMOGLOBIN: 14.7 G/DL (ref 13.5–17.5)
LIPASE: 43 U/L (ref 13–60)
LYMPHOCYTES ABSOLUTE: 3.8 K/UL (ref 1–5.1)
LYMPHOCYTES RELATIVE PERCENT: 38 %
MCH RBC QN AUTO: 30.6 PG (ref 26–34)
MCHC RBC AUTO-ENTMCNC: 34.6 G/DL (ref 31–36)
MCV RBC AUTO: 88.4 FL (ref 80–100)
MONOCYTES ABSOLUTE: 0.6 K/UL (ref 0–1.3)
MONOCYTES RELATIVE PERCENT: 5.7 %
NEUTROPHILS ABSOLUTE: 5.1 K/UL (ref 1.7–7.7)
NEUTROPHILS RELATIVE PERCENT: 50.7 %
PDW BLD-RTO: 13.4 % (ref 12.4–15.4)
PLATELET # BLD: 274 K/UL (ref 135–450)
PMV BLD AUTO: 6.5 FL (ref 5–10.5)
POTASSIUM SERPL-SCNC: 4.3 MMOL/L (ref 3.5–5.1)
RBC # BLD: 4.81 M/UL (ref 4.2–5.9)
SODIUM BLD-SCNC: 135 MMOL/L (ref 136–145)
TOTAL PROTEIN: 7.1 G/DL (ref 6.4–8.2)
TROPONIN: <0.01 NG/ML
WBC # BLD: 10 K/UL (ref 4–11)

## 2022-04-17 PROCEDURE — 2580000003 HC RX 258: Performed by: NURSE PRACTITIONER

## 2022-04-17 PROCEDURE — 83690 ASSAY OF LIPASE: CPT

## 2022-04-17 PROCEDURE — 99284 EMERGENCY DEPT VISIT MOD MDM: CPT

## 2022-04-17 PROCEDURE — 85025 COMPLETE CBC W/AUTO DIFF WBC: CPT

## 2022-04-17 PROCEDURE — 6360000004 HC RX CONTRAST MEDICATION: Performed by: NURSE PRACTITIONER

## 2022-04-17 PROCEDURE — 80053 COMPREHEN METABOLIC PANEL: CPT

## 2022-04-17 PROCEDURE — 84484 ASSAY OF TROPONIN QUANT: CPT

## 2022-04-17 PROCEDURE — 74177 CT ABD & PELVIS W/CONTRAST: CPT

## 2022-04-17 RX ORDER — 0.9 % SODIUM CHLORIDE 0.9 %
1000 INTRAVENOUS SOLUTION INTRAVENOUS ONCE
Status: COMPLETED | OUTPATIENT
Start: 2022-04-17 | End: 2022-04-17

## 2022-04-17 RX ADMIN — IOPAMIDOL 75 ML: 755 INJECTION, SOLUTION INTRAVENOUS at 20:41

## 2022-04-17 RX ADMIN — SODIUM CHLORIDE 1000 ML: 9 INJECTION, SOLUTION INTRAVENOUS at 19:54

## 2022-04-17 ASSESSMENT — PAIN SCALES - GENERAL
PAINLEVEL_OUTOF10: 5
PAINLEVEL_OUTOF10: 6

## 2022-04-17 ASSESSMENT — PAIN - FUNCTIONAL ASSESSMENT: PAIN_FUNCTIONAL_ASSESSMENT: 0-10

## 2022-04-18 NOTE — ED NOTES
Pt instructed to follow up with PCP. Assessed per Ольга Beatty NP.      Sam Watters, PARTICION  58/98/41 [id number]

## 2022-04-19 NOTE — ED PROVIDER NOTES
[address] [place] [address]  ED  EMERGENCY DEPARTMENT ENCOUNTER      This patient was not seen and evaluated by the attending physician. Pt Name: Beata Ortiz  MRN: [id number]  [place] 1960  Date of evaluation: 4/17/2022  Provider: Saint Carbo, APRN - CNP-C  PCP: Mee Oneill MD      History provided by the patient. CHIEFCOMPLAINT:     Chief Complaint   Patient presents with    Abdominal Pain     BUQ abd pain, 5/10, few hours. Nausea, no emesis. Ate spicy food 4-16. Hx cancer. HISTORY OF PRESENT ILLNESS:      Beata Ortiz is a 59 y.o. male who presents to [address] [place] [address]  ED with complaints of abdominal pain. Patient planing of upper quadrant abdominal pain its been going on for the last several hours, has had no vomiting no nausea, states that he ate some spicy food, has a history of colorectal cancer. Patient appears very comfortable sitting in the chair he not appear to be distressed, he is here for further evaluation. Dino Hooks  SEVERITY:6  DURATION:today  MODIFYING FACTORS:none noted    Nursing Notes were reviewed     REVIEW OF SYSTEMS:     Review of Systems  All systems, a total of 10, are reviewed and negative except for those that were just noted in history present illness.         PAST MEDICAL HISTORY:     Past Medical History:   Diagnosis Date    Acid reflux     Cancer ([address]     Cholelithiasis 03/19/2019    Depression     DM (diabetes mellitus) (HCC)     History of left bundle branch block (LBBB)     HX OTHER MEDICAL     \"blood doesn't flow fast enough into heart\"    Wears dentures          SURGICAL HISTORY:      Past Surgical History:   Procedure Laterality Date    APPENDECTOMY      CHOLECYSTECTOMY, LAPAROSCOPIC N/A 3/19/2019    LAPAROSCOPIC CHOLECYSTECTOMY WITH INTRAOPERATIVE, CHOLANGIOGRAM performed by Harjeet Tapia MD at [address]:       Discharge Medication List as of 4/17/2022  9:30 PM      CONTINUE these medications which have NOT CHANGED    Details   FLUoxetine (PROZAC) 20 MG capsule TAKE 1 CAPSULE BY MOUTH EVERY DAY, Disp-30 capsule, R-5Normal      pramipexole (MIRAPEX) 0.125 MG tablet Take 1 tablet by mouth nightly, Disp-90 tablet, R-3Normal      colestipol (COLESTID) 1 g tablet Take 1 tablet by mouth 2 times daily, Disp-180 tablet, R-3Normal      gabapentin (NEURONTIN) 300 MG capsule Take 1 capsule by mouth nightly for 90 days. Intended supply: 30 days, Disp-90 capsule, R-2Normal      fluticasone (FLONASE) 50 MCG/ACT nasal spray 2 sprays by Each Nostril route daily, Disp-16 g, R-5Normal      montelukast (SINGULAIR) 10 MG tablet Take 1 tablet by mouth daily, Disp-30 tablet, R-3Normal      albuterol sulfate HFA (VENTOLIN HFA) 108 (90 Base) MCG/ACT inhaler Inhale 2 puffs into the lungs 4 times daily as needed for Wheezing, Disp-18 g, R-5Normal      sildenafil (VIAGRA) 50 MG tablet Take 1 tablet by mouth daily as needed for Erectile Dysfunction, Disp-10 tablet,R-0Normal               ALLERGIES:    Patient has no known allergies.     FAMILY HISTORY:       Family History   Problem Relation Age of Onset   [facility] Cancer Father         lung    Mult Sclerosis Maternal Aunt           SOCIAL HISTORY:     Social History     Socioeconomic History    Marital status:      Spouse name: None    Number of children: None    Years of education: None    Highest education level: None   Occupational History    None   Tobacco Use    Smoking status: Current Every Day Smoker     Packs/day: 1.00     Years: 46.00     Pack years: 46.00     Types: Cigarettes    Smokeless tobacco: Never Used   Vaping Use    Vaping Use: Never used   Substance and Sexual Activity    Alcohol use: No    Drug use: No    Sexual activity: Yes     Partners: Female   Other Topics Concern    None   Social History Narrative    None     Social Determinants of Health     Financial Resource Strain:     Difficulty of Paying Living Expenses: Not on file   Food Insecurity:     Worried About Running Out of Food in the Last Year: Not on file    Ran Out of Food in the Last Year: Not on file   Transportation Needs:     Lack of Transportation (Medical): Not on file    Lack of Transportation (Non-Medical): Not on file   Physical Activity:     Days of Exercise per Week: Not on file    Minutes of Exercise per Session: Not on file   Stress:     Feeling of Stress : Not on file   Social Connections:     Frequency of Communication with Friends and Family: Not on file    Frequency of Social Gatherings with Friends and Family: Not on file    Attends Yarsanism Services: Not on file    Active Member of Clubs or Organizations: Not on file    Attends Club or Organization Meetings: Not on file    Marital Status: Not on file   Intimate Partner Violence:     Fear of Current or Ex-Partner: Not on file    Emotionally Abused: Not on file    Physically Abused: Not on file    Sexually Abused: Not on file   Housing Stability:     Unable to Pay for Housing in the Last Year: Not on file    Number of [place] in the Last Year: Not on file    Unstable Housing in the Last Year: Not on file       SCREENINGS:    Maranda Coma Scale  Eye Opening: Spontaneous  Best Verbal Response: Oriented  Best Motor Response: Obeys commands  Maranda Coma Scale Score: 15        PHYSICAL EXAM:       ED Triage Vitals [04/17/22 1922]   BP Temp Temp Source Pulse Resp SpO2 Height Weight   139/81 98.5 °F (36.9 °C) Oral 97 20 98 % 5' 9\" (1.753 m) 148 lb (67.1 kg)       Physical Exam    CONSTITUTIONAL: Awake and alert. Cooperative. Well-developed. Well-nourished. Non-toxic. No acute distress. Vitals:    04/17/22 1922 04/17/22 2127   BP: 139/81 133/79   Pulse: 97 67   Resp: 20 16   Temp: 98.5 °F (36.9 °C)    TempSrc: Oral    SpO2: 98% 99%   Weight: 148 lb (67.1 kg)    Height: 5' 9\" (1.753 m)      HENT: Normocephalic. Atraumatic.  External ears normal, without discharge. TMs clear bilaterally. Nonasal discharge. Oropharynx clear, no erythema. Mucous membranes moist.  EYES: Conjunctiva non-injected, nolid abnormalities noted. No scleral icterus. PERRL. EOM's grossly intact. Anterior chambers clear. NECK: Supple. Normal ROM. No meningismus. No thyroid tenderness or swelling noted. CARDIOVASCULAR: RRR. No Murmer. No carotid bruits. PULMONARY/CHEST WALL: Effort normal. No tachypnea. Lungs clear to ausculation. ABDOMEN: Normal BS. Soft. Nondistended. No tenderness to palpation. No guarding. No hernias noted. No splenomegaly. Back: Spine is midline. No ecchymosis. No crepituson palpation. No obvious subluxation of vertebral column. No saddle anesthesia or evidence of cauda equina. /ANORECTAL: Not assessed  MUSKULOSKELETAL: Normal ROM. No acute deformities. No edema. No tenderness to palpate. SKIN: Warm and dry. NEUROLOGICAL:  GCS 15. CN II-XII grossly intact. Strength is 5/5 in all extremities and sensation is intact. PSYCHIATRIC: Normal affect, normal insight and judgement. Alert and oriented x 3.         DIAGNOSTIC RESULTS:     LABS:    Results for orders placed or performed during the hospital encounter of 04/17/22   CBC with Auto Differential   Result Value Ref Range    WBC 10.0 4.0 - 11.0 K/uL    RBC 4.81 4.20 - 5.90 M/uL    Hemoglobin 14.7 13.5 - 17.5 g/dL    Hematocrit 42.5 40.5 - 52.5 %    MCV 88.4 80.0 - 100.0 fL    MCH 30.6 26.0 - 34.0 pg    MCHC 34.6 31.0 - 36.0 g/dL    RDW 13.4 12.4 - 15.4 %    Platelets 343 925 - 608 K/uL    MPV 6.5 5.0 - 10.5 fL    Neutrophils % 50.7 %    Lymphocytes % 38.0 %    Monocytes % 5.7 %    Eosinophils % 4.3 %    Basophils % 1.3 %    Neutrophils Absolute 5.1 1.7 - 7.7 K/uL    Lymphocytes Absolute 3.8 1.0 - 5.1 K/uL    Monocytes Absolute 0.6 0.0 - 1.3 K/uL    Eosinophils Absolute 0.4 0.0 - 0.6 K/uL    Basophils Absolute 0.1 0.0 - 0.2 K/uL   Comprehensive Metabolic Panel   Result Value Ref Range    Sodium 135 (L) 136 - 145 mmol/L    Potassium 4.3 3.5 - 5.1 mmol/L    Chloride 98 (L) 99 - 110 mmol/L    CO2 27 21 - 32 mmol/L    Anion Gap 10 3 - 16    Glucose 133 (H) 70 - 99 mg/dL    BUN 12 7 - 20 mg/dL    CREATININE 0.7 (L) 0.8 - 1.3 mg/dL    GFR Non-African American >60 >60    GFR African American >60 >60    Calcium 9.6 8.3 - 10.6 mg/dL    Total Protein 7.1 6.4 - 8.2 g/dL    Albumin 4.9 3.4 - 5.0 g/dL    Albumin/Globulin Ratio 2.2 1.1 - 2.2    Total Bilirubin <0.2 0.0 - 1.0 mg/dL    Alkaline Phosphatase 123 40 - 129 U/L    ALT 31 10 - 40 U/L    AST 20 15 - 37 U/L   Troponin   Result Value Ref Range    Troponin <0.01 <0.01 ng/mL   Lipase   Result Value Ref Range    Lipase 43.0 13.0 - 60.0 U/L         RADIOLOGY:  All x-ray studies are viewed/reviewed by me. Formal interpretations per the radiologist are as follows:      CT ABDOMEN PELVIS W IV CONTRAST Additional Contrast? None   Final Result   Status post cholecystectomy with no acute abnormality seen. Mild chronic liver changes with fatty replacement throughout which is   unchanged. Prominent extrarenal pelvis in both kidneys which is more apparent. There   may be an element of congenital UPJ narrowing bilaterally which is more   prominent with both ureters appearing normal caliber distally with no obvious   ureteral stones. Recommend clinical follow-up. Mild constipation with no obstruction. Minimal prostatic enlargement with no pelvic mass or active inflammation. Small umbilical hernia which is unchanged. Mild bibasilar atelectasis or early infiltrates posteriorly. EKG:  See EKG interpretation by an attending physician.       PROCEDURES:   N/A    CRITICAL CARE TIME:   N/A    CONSULTS:  None      EMERGENCY DEPARTMENT COURSE andDIFFERENTIAL DIAGNOSIS/MDM:   Vitals:    Vitals:    04/17/22 1922 04/17/22 2127   BP: 139/81 133/79   Pulse: 97 67   Resp: 20 16   Temp: 98.5 °F (36.9 °C)    TempSrc: Oral    SpO2: 98% 99%   Weight: 148 lb (67.1 kg) Height: 5' 9\" (1.753 m)        Patient wasgiven the following medications:  Medications   0.9 % sodium chloride bolus (0 mLs IntraVENous Stopped 4/17/22 2128)   iopamidol (ISOVUE-370) 76 % injection 75 mL (75 mLs IntraVENous Given 4/17/22 2041)         Patient was evaluated independently by myself with the attending physician available for consultation. Patient presented to the emergency department today with complaints of upper abdominal pain has been going on for the last several hours, he does have a history of cancer and even though he did not appear to be distressed and his abdomen was benign and I did not feel that a CT was reasonable given his history, CT was unremarkable, laboratory studies unremarkable today, he required no intervention from a pain standpoint. His vital signs are stable, he was discharged home with instructions to follow-up as an outpatient. Patient verbalized understanding of the plan of care and agreed with it. Patient laboratory studies, radiographic imaging, and assessment were all discussed with the patient and/orpatient family. There was shared decision-making between myself as well as the patient and/or their surrogate and we are all in agreement with discharge home. There was an opportunity for questions and all questions were answered tothe best of my ability and to the satisfaction of the patient and/or patient family. FINAL IMPRESSION:      1. Generalized abdominal pain          DISPOSITION/PLAN:   DISPOSITION Decision To Discharge      PATIENT REFERRED TO:  Ofelia Anderson MD  [id number] [facility].   [address] [place] [id number]  [phone]    Call   For follow up      DISCHARGE MEDICATIONS:  Discharge Medication List as of 4/17/2022  9:30 PM                     (Please note thatportions of this note were completed with a voice recognition program.  Efforts were made to edit the dictations, but occasionally words are mis-transcribed.)    Radha Gannon, TRACI - MARTÍN-C (electronicallysigned)        TRACI Zaragoza - CNP  04/19/22 1851

## 2022-04-22 ENCOUNTER — OFFICE VISIT (OUTPATIENT)
Dept: FAMILY MEDICINE CLINIC | Age: 62
End: 2022-04-22
Payer: MEDICAID

## 2022-04-22 VITALS
BODY MASS INDEX: 23.19 KG/M2 | WEIGHT: 153 LBS | HEART RATE: 69 BPM | SYSTOLIC BLOOD PRESSURE: 118 MMHG | HEIGHT: 68 IN | OXYGEN SATURATION: 97 % | DIASTOLIC BLOOD PRESSURE: 80 MMHG

## 2022-04-22 DIAGNOSIS — Z87.891 PERSONAL HISTORY OF TOBACCO USE: ICD-10-CM

## 2022-04-22 DIAGNOSIS — G47.00 INSOMNIA, UNSPECIFIED TYPE: ICD-10-CM

## 2022-04-22 DIAGNOSIS — E53.8 VITAMIN B 12 DEFICIENCY: ICD-10-CM

## 2022-04-22 DIAGNOSIS — R10.31 RIGHT LOWER QUADRANT ABDOMINAL PAIN: ICD-10-CM

## 2022-04-22 DIAGNOSIS — Z09 HOSPITAL DISCHARGE FOLLOW-UP: Primary | ICD-10-CM

## 2022-04-22 PROCEDURE — G8420 CALC BMI NORM PARAMETERS: HCPCS | Performed by: NURSE PRACTITIONER

## 2022-04-22 PROCEDURE — 1111F DSCHRG MED/CURRENT MED MERGE: CPT | Performed by: NURSE PRACTITIONER

## 2022-04-22 PROCEDURE — 3017F COLORECTAL CA SCREEN DOC REV: CPT | Performed by: NURSE PRACTITIONER

## 2022-04-22 PROCEDURE — 99214 OFFICE O/P EST MOD 30 MIN: CPT | Performed by: NURSE PRACTITIONER

## 2022-04-22 PROCEDURE — 4004F PT TOBACCO SCREEN RCVD TLK: CPT | Performed by: NURSE PRACTITIONER

## 2022-04-22 PROCEDURE — G8427 DOCREV CUR MEDS BY ELIG CLIN: HCPCS | Performed by: NURSE PRACTITIONER

## 2022-04-22 PROCEDURE — G0296 VISIT TO DETERM LDCT ELIG: HCPCS | Performed by: NURSE PRACTITIONER

## 2022-04-22 RX ORDER — HYDROXYZINE HYDROCHLORIDE 25 MG/1
25 TABLET, FILM COATED ORAL NIGHTLY
Qty: 30 TABLET | Refills: 0 | Status: SHIPPED | OUTPATIENT
Start: 2022-04-22 | End: 2022-05-19

## 2022-04-22 RX ORDER — CYANOCOBALAMIN 1000 UG/ML
1000 INJECTION INTRAMUSCULAR; SUBCUTANEOUS ONCE
Status: COMPLETED | OUTPATIENT
Start: 2022-04-22 | End: 2022-04-22

## 2022-04-22 RX ADMIN — CYANOCOBALAMIN 1000 MCG: 1000 INJECTION INTRAMUSCULAR; SUBCUTANEOUS at 09:43

## 2022-04-22 ASSESSMENT — ENCOUNTER SYMPTOMS
RESPIRATORY NEGATIVE: 1
ABDOMINAL PAIN: 1
COUGH: 0
SHORTNESS OF BREATH: 0
WHEEZING: 0

## 2022-04-22 NOTE — PROGRESS NOTES
Post-Discharge Transitional Care Follow Up      Jack Nissen   YOB: 1960    Date of Office Visit:  4/22/2022  Date of Hospital Admission: 4/17/22  Date of Hospital Discharge: 4/17/22  Readmission Risk Score (high >=14%. Medium >=10%):No data recorded    Care management risk score Rising risk (score 2-5) and Complex Care (Scores >=6): 3     Non face to face  following discharge, date last encounter closed (first attempt may have been earlier): *No documented post hospital discharge outreach found in the last 14 days     Call initiated 2 business days of discharge: *No response recorded in the last 14 days     Hospital discharge follow-up  -     [facility] MED LIST  Right lower quadrant abdominal pain  Patient had a positive Cologuard test and has a colonoscopy scheduled but advised patient to go ahead and schedule an appointment to see GI and see if they feel like they need to do the colonoscopy sooner or if there is some other intervention to help with his pain. CT scan of the abdomen was stable. -     [facility]  Insomnia, unspecified type  I sent in hydroxyzine for him to try at night to help with sleep.   will follow-up if no improvement. -     hydrOXYzine (ATARAX) 25 MG tablet; Take 1 tablet by mouth nightly, Disp-30 tablet, R-0Normal  Personal history of tobacco use  -     CA VISIT TO DISCUSS LUNG CA SCREEN W LDCT  -     CT Lung Screen (Annual); Future  Vitamin B 12 deficiency  -     cyanocobalamin injection 1,000 mcg; 1,000 mcg, IntraMUSCular, ONCE, 1 dose, On Fri 4/22/22 at [facility] 13 Making: moderate complexity            Subjective:   HPI   Patient presents today for an ER follow-up. He was seen at the ER for right lower quadrant abdominal pain. He did have a positive Cologuard screening which has him concerned. He does have a colonoscopy scheduled for July.   He denies any nausea vomiting or constipation. He reports he does have frequent diarrhea and this has been since he had his gallbladder removed. He is on colestipol twice a day. He had a CT scan done while at the ER that looks stable. It showed some mild constipation but no obstruction. Patient and wife are requesting another B12 shot today. His wife reports that this really helps his energy levels. Patient has also noticed he has been having trouble sleeping here lately. He states is not due to the pain but rather due to him being worried about his positive Cologuard results. Patient is concerned that he has colon cancer based on these results. I did explain the results and that the positive result does not mean colon cancer necessarily. I advised that he needs a colonoscopy to further evaluate that positive result. Inpatient course: Discharge summary reviewed- see chart. Interval history/Current status: stable    Patient Active Problem List   Diagnosis    Smoker    Abnormal stress test    Chest pain    SOB (shortness of breath)    Diabetes ([address]    Symptomatic cholelithiasis    Pancytopenia ([facility] [address]       Medications listed as ordered at the time of discharge from hospital     Medication List          Accurate as of April 22, 2022 11:53 AM. If you have any questions, ask your nurse or doctor.             START taking these medications    hydrOXYzine 25 MG tablet  Commonly known as: ATARAX  Take 1 tablet by mouth nightly  Started by: TRACI Villalobos CNP        CONTINUE taking these medications    albuterol sulfate  (90 Base) MCG/ACT inhaler  Commonly known as: Ventolin HFA  Inhale 2 puffs into the lungs 4 times daily as needed for Wheezing     colestipol 1 g tablet  Commonly known as: Colestid  Take 1 tablet by mouth 2 times daily     FLUoxetine 20 MG capsule  Commonly known as: PROZAC  TAKE 1 CAPSULE BY MOUTH EVERY DAY     fluticasone 50 MCG/ACT nasal spray  Commonly known as: FLONASE  2 sprays by Each Nostril route daily     gabapentin 300 MG capsule  Commonly known as: NEURONTIN  Take 1 capsule by mouth nightly for 90 days. Intended supply: 30 days     montelukast 10 MG tablet  Commonly known as: SINGULAIR  Take 1 tablet by mouth daily     pramipexole 0.125 MG tablet  Commonly known as: Mirapex  Take 1 tablet by mouth nightly     sildenafil 50 MG tablet  Commonly known as: VIAGRA  Take 1 tablet by mouth daily as needed for Erectile Dysfunction           Where to Get Your Medications      These medications were sent to [facility]/pharmacy #[id number]- [place], [place] - [address] [phone] - F [phone]  [address] [address]    Phone: [phone]   · hydrOXYzine 25 MG tablet          Medications marked \"taking\" at this time  Outpatient Medications Marked as Taking for the 4/22/22 encounter (Office Visit) with TRACI Jeffries CNP   Medication Sig Dispense Refill    hydrOXYzine (ATARAX) 25 MG tablet Take 1 tablet by mouth nightly 30 tablet 0    FLUoxetine (PROZAC) 20 MG capsule TAKE 1 CAPSULE BY MOUTH EVERY DAY 30 capsule 5    pramipexole (MIRAPEX) 0.125 MG tablet Take 1 tablet by mouth nightly 90 tablet 3    colestipol (COLESTID) 1 g tablet Take 1 tablet by mouth 2 times daily 180 tablet 3    gabapentin (NEURONTIN) 300 MG capsule Take 1 capsule by mouth nightly for 90 days.  Intended supply: 30 days 90 capsule 2    fluticasone (FLONASE) 50 MCG/ACT nasal spray 2 sprays by Each Nostril route daily 16 g 5    montelukast (SINGULAIR) 10 MG tablet Take 1 tablet by mouth daily 30 tablet 3    albuterol sulfate HFA (VENTOLIN HFA) 108 (90 Base) MCG/ACT inhaler Inhale 2 puffs into the lungs 4 times daily as needed for Wheezing 18 g 5    sildenafil (VIAGRA) 50 MG tablet Take 1 tablet by mouth daily as needed for Erectile Dysfunction 10 tablet 0        Medications patient taking as of now reconciled against medications ordered at time of hospital discharge: Yes    Review of Systems Constitutional: Positive for fatigue. HENT: Negative. Respiratory: Negative. Negative for cough, shortness of breath and wheezing. Cardiovascular: Negative. Gastrointestinal: Positive for abdominal pain. Musculoskeletal: Negative. Skin: Negative. Neurological: Negative. Psychiatric/Behavioral: Positive for sleep disturbance. Objective:    /80   Pulse 69   Ht 5' 8\" (1.727 m)   Wt 153 lb (69.4 kg)   SpO2 97%   BMI 23.26 kg/m²   Physical Exam  Vitals reviewed. Cardiovascular:      Rate and Rhythm: Normal rate and regular rhythm. Heart sounds: Normal heart sounds. Pulmonary:      Effort: Pulmonary effort is normal.      Breath sounds: Normal breath sounds. Abdominal:      General: Bowel sounds are normal.   Skin:     General: Skin is warm and dry. Neurological:      Mental Status: He is alert and oriented to person, place, and time. Psychiatric:         Mood and Affect: Mood normal.         Behavior: Behavior normal.         An electronic signature was used to authenticate this note. --TRACI Garcia - [facility]  Low Dose CT (LDCT) Lung Screening criteria met:     Age 50-77(Medicare) or 50-80 (USPSTF)   Pack year smoking >20   Still smoking or less than 15 year since quit   No sign or symptoms of lung cancer   > 11 months since last LDCT     Risks and benefits of lung cancer screening with LDCT scans discussed:    Significance of positive screen - False-positive LDCT results often occur. 95% of all positive results do not lead to a diagnosis of cancer. Usually further imaging can resolve most false-positive results; however, some patients may require invasive procedures. Over diagnosis risk - 10% to 12% of screen-detected lung cancer cases are over diagnosed--that is, the cancer would not have been detected in the patient's lifetime without the screening.     Need for follow up screens annually to continue lung cancer screening effectiveness     Risks associated with radiation from annual LDCT- Radiation exposure is about the same as for a mammogram, which is about 1/3 of the annual background radiation exposure from everyday life. Starting screening at age 54 is not likely to increase cancer risk from radiation exposure. Patients with comorbidities resulting in life expectancy of < 10 years, or that would preclude treatment of an abnormality identified on CT, should not be screened due to lack of benefit.     To obtain maximal benefit from this screening, smoking cessation and long-term abstinence from smoking is critical

## 2022-04-25 NOTE — TELEPHONE ENCOUNTER
Pt requesting GI Referral faxed to [phone], for Colonoscopy. Pt was given this GI fax number to different GI office but Pt does not know doctor name or office name.

## 2022-04-27 ENCOUNTER — TELEPHONE (OUTPATIENT)
Dept: FAMILY MEDICINE CLINIC | Age: 62
End: 2022-04-27

## 2022-04-27 NOTE — TELEPHONE ENCOUNTER
----- Message from Caprice Montilla sent at 4/27/2022  1:16 PM EDT -----  Subject: Message to Provider    QUESTIONS  Information for Provider? supposed to  dye, wants to know if his   sister can.  ---------------------------------------------------------------------------  --------------  CALL BACK INFO  What is the best way for the office to contact you? OK to leave message on   voicemail  Preferred Call Back Phone Number?  [phone]  ---------------------------------------------------------------------------  --------------  SCRIPT ANSWERS  undefined

## 2022-04-27 NOTE — TELEPHONE ENCOUNTER
Someone showed up here but this should be picked up at [facility].  Pt informed

## 2022-05-06 ENCOUNTER — TELEPHONE (OUTPATIENT)
Dept: FAMILY MEDICINE CLINIC | Age: 62
End: 2022-05-06

## 2022-05-19 DIAGNOSIS — G47.00 INSOMNIA, UNSPECIFIED TYPE: ICD-10-CM

## 2022-05-19 RX ORDER — HYDROXYZINE HYDROCHLORIDE 25 MG/1
25 TABLET, FILM COATED ORAL NIGHTLY
Qty: 30 TABLET | Refills: 0 | Status: SHIPPED | OUTPATIENT
Start: 2022-05-19 | End: 2022-06-18

## 2022-06-14 ENCOUNTER — OFFICE VISIT (OUTPATIENT)
Dept: FAMILY MEDICINE CLINIC | Age: 62
End: 2022-06-14
Payer: MEDICAID

## 2022-06-14 VITALS
HEIGHT: 68 IN | SYSTOLIC BLOOD PRESSURE: 106 MMHG | DIASTOLIC BLOOD PRESSURE: 64 MMHG | BODY MASS INDEX: 23.19 KG/M2 | HEART RATE: 81 BPM | OXYGEN SATURATION: 97 % | WEIGHT: 153 LBS

## 2022-06-14 DIAGNOSIS — R53.83 FATIGUE, UNSPECIFIED TYPE: ICD-10-CM

## 2022-06-14 DIAGNOSIS — J44.1 COPD EXACERBATION (HCC): ICD-10-CM

## 2022-06-14 DIAGNOSIS — R29.898 LEG WEAKNESS, BILATERAL: ICD-10-CM

## 2022-06-14 DIAGNOSIS — H91.93 BILATERAL HEARING LOSS, UNSPECIFIED HEARING LOSS TYPE: ICD-10-CM

## 2022-06-14 DIAGNOSIS — I48.91 ATRIAL FIBRILLATION, UNSPECIFIED TYPE (HCC): Primary | ICD-10-CM

## 2022-06-14 PROCEDURE — G8420 CALC BMI NORM PARAMETERS: HCPCS | Performed by: FAMILY MEDICINE

## 2022-06-14 PROCEDURE — 3023F SPIROM DOC REV: CPT | Performed by: FAMILY MEDICINE

## 2022-06-14 PROCEDURE — G8427 DOCREV CUR MEDS BY ELIG CLIN: HCPCS | Performed by: FAMILY MEDICINE

## 2022-06-14 PROCEDURE — 4004F PT TOBACCO SCREEN RCVD TLK: CPT | Performed by: FAMILY MEDICINE

## 2022-06-14 PROCEDURE — 3017F COLORECTAL CA SCREEN DOC REV: CPT | Performed by: FAMILY MEDICINE

## 2022-06-14 PROCEDURE — 99214 OFFICE O/P EST MOD 30 MIN: CPT | Performed by: FAMILY MEDICINE

## 2022-06-14 RX ORDER — CYANOCOBALAMIN 1000 UG/ML
1000 INJECTION INTRAMUSCULAR; SUBCUTANEOUS ONCE
Status: COMPLETED | OUTPATIENT
Start: 2022-06-14 | End: 2022-06-14

## 2022-06-14 RX ADMIN — CYANOCOBALAMIN 1000 MCG: 1000 INJECTION INTRAMUSCULAR; SUBCUTANEOUS at 09:23

## 2022-06-14 ASSESSMENT — PATIENT HEALTH QUESTIONNAIRE - PHQ9
SUM OF ALL RESPONSES TO PHQ QUESTIONS 1-9: 6
4. FEELING TIRED OR HAVING LITTLE ENERGY: 2
2. FEELING DOWN, DEPRESSED OR HOPELESS: 0
9. THOUGHTS THAT YOU WOULD BE BETTER OFF DEAD, OR OF HURTING YOURSELF: 0
1. LITTLE INTEREST OR PLEASURE IN DOING THINGS: 3
6. FEELING BAD ABOUT YOURSELF - OR THAT YOU ARE A FAILURE OR HAVE LET YOURSELF OR YOUR FAMILY DOWN: 0
SUM OF ALL RESPONSES TO PHQ QUESTIONS 1-9: 6
8. MOVING OR SPEAKING SO SLOWLY THAT OTHER PEOPLE COULD HAVE NOTICED. OR THE OPPOSITE, BEING SO FIGETY OR RESTLESS THAT YOU HAVE BEEN MOVING AROUND A LOT MORE THAN USUAL: 0
5. POOR APPETITE OR OVEREATING: 0
SUM OF ALL RESPONSES TO PHQ QUESTIONS 1-9: 6
3. TROUBLE FALLING OR STAYING ASLEEP: 0
7. TROUBLE CONCENTRATING ON THINGS, SUCH AS READING THE NEWSPAPER OR WATCHING TELEVISION: 1
10. IF YOU CHECKED OFF ANY PROBLEMS, HOW DIFFICULT HAVE THESE PROBLEMS MADE IT FOR YOU TO DO YOUR WORK, TAKE CARE OF THINGS AT HOME, OR GET ALONG WITH OTHER PEOPLE: 0
SUM OF ALL RESPONSES TO PHQ QUESTIONS 1-9: 6
SUM OF ALL RESPONSES TO PHQ9 QUESTIONS 1 & 2: 3

## 2022-06-14 NOTE — PROGRESS NOTES
Colletta Kingfisher (:  1960) is a 59 y.o. male,Established patient, here for evaluation of the following chief complaint(s):  Fall (legs not working )         ASSESSMENT/PLAN:  1. Atrial fibrillation, unspecified type ([address]  Currently apparently in sinus rhythm. 2. COPD exacerbation ([address]  Stable enough at this time where treatment is unwarranted  3. Bilateral hearing loss, unspecified hearing loss type  -     Gamal Kuhn, Audiology, [facility]  4. Leg weakness, bilateral  -     EMG; Future  5. Fatigue, unspecified type  -     cyanocobalamin injection 1,000 mcg; 1,000 mcg, IntraMUSCular, ONCE, 1 dose, On 22 at 0945      No follow-ups on file. Subjective   SUBJECTIVE/OBJECTIVE:  Colletta Kingfisher is a 59 y.o. male. Patient presents with:  Fall: legs not working       Patient has legs giving out and falling. Numbness on left side. Patient notes that this has been going on for several weeks. Patient notes that he has had a lot of cramping in his legs. The cramping is not associated necessarily with walking or standing. He does feel some notes that his legs give out with standing. Patient has also history of B12 deficiency and is feeling increasing fatigue. Patient has a history of COPD. Patient has been taking his inhalers regularly. Has had mild increase in symptoms recently. Patient has bilateral hearing loss. Has trouble hearing and does not have hearing aids at this time is ready to get a hearing test  The patients PMH, surgical history, family history, medications, allergies were all reviewed and updated as appropriate today. Review of Systems       Objective   Physical Exam  Vitals and nursing note reviewed. Constitutional:       Appearance: Normal appearance. He is well-developed. HENT:      Head: Normocephalic and atraumatic.       Right Ear: External ear normal.      Left Ear: External ear normal.      Nose: Nose normal.   Eyes:

## 2022-06-20 ENCOUNTER — TELEPHONE (OUTPATIENT)
Dept: FAMILY MEDICINE CLINIC | Age: 62
End: 2022-06-20

## 2022-06-20 ENCOUNTER — APPOINTMENT (OUTPATIENT)
Dept: CT IMAGING | Age: 62
End: 2022-06-20
Payer: MEDICAID

## 2022-06-20 ENCOUNTER — HOSPITAL ENCOUNTER (EMERGENCY)
Age: 62
Discharge: HOME | End: 2022-06-20
Attending: EMERGENCY MEDICINE
Payer: MEDICAID

## 2022-06-20 VITALS
OXYGEN SATURATION: 97 % | DIASTOLIC BLOOD PRESSURE: 79 MMHG | SYSTOLIC BLOOD PRESSURE: 124 MMHG | TEMPERATURE: 98.6 F | RESPIRATION RATE: 18 BRPM | HEART RATE: 63 BPM

## 2022-06-20 DIAGNOSIS — G25.81 RLS (RESTLESS LEGS SYNDROME): ICD-10-CM

## 2022-06-20 DIAGNOSIS — R29.898 LEG WEAKNESS, BILATERAL: Primary | ICD-10-CM

## 2022-06-20 LAB
A/G RATIO: 2.6 (ref 1.1–2.2)
ALBUMIN SERPL-MCNC: 4.7 G/DL (ref 3.4–5)
ALP BLD-CCNC: 117 U/L (ref 40–129)
ALT SERPL-CCNC: 33 U/L (ref 10–40)
ANION GAP SERPL CALCULATED.3IONS-SCNC: 8 MMOL/L (ref 3–16)
AST SERPL-CCNC: 21 U/L (ref 15–37)
BACTERIA: ABNORMAL /HPF
BASOPHILS ABSOLUTE: 0.1 K/UL (ref 0–0.2)
BASOPHILS RELATIVE PERCENT: 1 %
BILIRUB SERPL-MCNC: 0.3 MG/DL (ref 0–1)
BILIRUBIN URINE: NEGATIVE
BLOOD, URINE: ABNORMAL
BUN BLDV-MCNC: 8 MG/DL (ref 7–20)
CALCIUM SERPL-MCNC: 9.4 MG/DL (ref 8.3–10.6)
CHLORIDE BLD-SCNC: 101 MMOL/L (ref 99–110)
CLARITY: CLEAR
CO2: 26 MMOL/L (ref 21–32)
COLOR: ABNORMAL
CREAT SERPL-MCNC: 0.6 MG/DL (ref 0.8–1.3)
EOSINOPHILS ABSOLUTE: 0.4 K/UL (ref 0–0.6)
EOSINOPHILS RELATIVE PERCENT: 4.5 %
EPITHELIAL CELLS, UA: ABNORMAL /HPF (ref 0–5)
GFR AFRICAN AMERICAN: >60
GFR NON-AFRICAN AMERICAN: >60
GLUCOSE BLD-MCNC: 152 MG/DL (ref 70–99)
GLUCOSE URINE: NEGATIVE MG/DL
HCT VFR BLD CALC: 39.3 % (ref 40.5–52.5)
HEMOGLOBIN: 14 G/DL (ref 13.5–17.5)
KETONES, URINE: NEGATIVE MG/DL
LEUKOCYTE ESTERASE, URINE: NEGATIVE
LYMPHOCYTES ABSOLUTE: 2.9 K/UL (ref 1–5.1)
LYMPHOCYTES RELATIVE PERCENT: 30.6 %
MAGNESIUM: 2.2 MG/DL (ref 1.8–2.4)
MCH RBC QN AUTO: 31.4 PG (ref 26–34)
MCHC RBC AUTO-ENTMCNC: 35.7 G/DL (ref 31–36)
MCV RBC AUTO: 88 FL (ref 80–100)
MICROSCOPIC EXAMINATION: YES
MONOCYTES ABSOLUTE: 0.5 K/UL (ref 0–1.3)
MONOCYTES RELATIVE PERCENT: 5.6 %
NEUTROPHILS ABSOLUTE: 5.5 K/UL (ref 1.7–7.7)
NEUTROPHILS RELATIVE PERCENT: 58.3 %
NITRITE, URINE: NEGATIVE
PDW BLD-RTO: 13.3 % (ref 12.4–15.4)
PH UA: 6 (ref 5–8)
PLATELET # BLD: 252 K/UL (ref 135–450)
PMV BLD AUTO: 6 FL (ref 5–10.5)
POTASSIUM SERPL-SCNC: 4.3 MMOL/L (ref 3.5–5.1)
PROTEIN UA: NEGATIVE MG/DL
RBC # BLD: 4.47 M/UL (ref 4.2–5.9)
RBC UA: ABNORMAL /HPF (ref 0–4)
SODIUM BLD-SCNC: 135 MMOL/L (ref 136–145)
SPECIFIC GRAVITY UA: 1.01 (ref 1–1.03)
TOTAL PROTEIN: 6.5 G/DL (ref 6.4–8.2)
URINE REFLEX TO CULTURE: ABNORMAL
URINE TYPE: ABNORMAL
UROBILINOGEN, URINE: 0.2 E.U./DL
WBC # BLD: 9.4 K/UL (ref 4–11)
WBC UA: ABNORMAL /HPF (ref 0–5)

## 2022-06-20 PROCEDURE — 83735 ASSAY OF MAGNESIUM: CPT

## 2022-06-20 PROCEDURE — 81001 URINALYSIS AUTO W/SCOPE: CPT

## 2022-06-20 PROCEDURE — 99284 EMERGENCY DEPT VISIT MOD MDM: CPT

## 2022-06-20 PROCEDURE — 85025 COMPLETE CBC W/AUTO DIFF WBC: CPT

## 2022-06-20 PROCEDURE — 36415 COLL VENOUS BLD VENIPUNCTURE: CPT

## 2022-06-20 PROCEDURE — 70450 CT HEAD/BRAIN W/O DYE: CPT

## 2022-06-20 PROCEDURE — 80053 COMPREHEN METABOLIC PANEL: CPT

## 2022-06-20 ASSESSMENT — PAIN - FUNCTIONAL ASSESSMENT: PAIN_FUNCTIONAL_ASSESSMENT: NONE - DENIES PAIN

## 2022-06-20 ASSESSMENT — ENCOUNTER SYMPTOMS
SORE THROAT: 0
NAUSEA: 0
RHINORRHEA: 0
EYE PAIN: 0
DIARRHEA: 0
BACK PAIN: 0
COUGH: 0
BLOOD IN STOOL: 0
SHORTNESS OF BREATH: 0
VOMITING: 0
ABDOMINAL PAIN: 0

## 2022-06-20 NOTE — ED PROVIDER NOTES
I independently examined and evaluated Nika Gilbert. All diagnostic, treatment, and disposition decisions were made by myself in conjunction with the [facility], [employer]. For all further details of the patient's emergency department visit, please see their documentation. 78-year-old male presents with leg weakness. He states that for couple years he has noticed leg weakness. He states his legs will give out on him when he is walking. He is not having any leg pain or back pain. No weakness in his arms. No numbness or tingling in his legs. No bowel or bladder symptoms. He states over the past 6 months he noticed that things are seemingly getting worse. He states that his legs just feel like Jell-O at times and he has had multiple falls at home. He lives at home with his wife. He denies fever or recent illness. He states he decided to come in today because he does think things are getting worse. Vitals:    06/20/22 1806   BP:    Pulse:    Resp: 18   Temp:    SpO2:    NIH stroke scale is 0.  2+ patellar reflexes. No midline C, T or L-spine tenderness. Gait is not ataxic but while walking he will bend his knees suddenly and then straighten again. He does not fall.     Results for orders placed or performed during the hospital encounter of 06/20/22   CBC with Auto Differential   Result Value Ref Range    WBC 9.4 4.0 - 11.0 K/uL    RBC 4.47 4.20 - 5.90 M/uL    Hemoglobin 14.0 13.5 - 17.5 g/dL    Hematocrit 39.3 (L) 40.5 - 52.5 %    MCV 88.0 80.0 - 100.0 fL    MCH 31.4 26.0 - 34.0 pg    MCHC 35.7 31.0 - 36.0 g/dL    RDW 13.3 12.4 - 15.4 %    Platelets 739 676 - 171 K/uL    MPV 6.0 5.0 - 10.5 fL    Neutrophils % 58.3 %    Lymphocytes % 30.6 %    Monocytes % 5.6 %    Eosinophils % 4.5 %    Basophils % 1.0 %    Neutrophils Absolute 5.5 1.7 - 7.7 K/uL    Lymphocytes Absolute 2.9 1.0 - 5.1 K/uL    Monocytes Absolute 0.5 0.0 - 1.3 K/uL    Eosinophils Absolute 0.4 0.0 - 0.6 K/uL    Basophils Absolute 0.1 0.0 - 0.2 K/uL   Magnesium   Result Value Ref Range    Magnesium 2.20 1.80 - 2.40 mg/dL   Comprehensive Metabolic Panel   Result Value Ref Range    Sodium 135 (L) 136 - 145 mmol/L    Potassium 4.3 3.5 - 5.1 mmol/L    Chloride 101 99 - 110 mmol/L    CO2 26 21 - 32 mmol/L    Anion Gap 8 3 - 16    Glucose 152 (H) 70 - 99 mg/dL    BUN 8 7 - 20 mg/dL    CREATININE 0.6 (L) 0.8 - 1.3 mg/dL    GFR Non-African American >60 >60    GFR African American >60 >60    Calcium 9.4 8.3 - 10.6 mg/dL    Total Protein 6.5 6.4 - 8.2 g/dL    Albumin 4.7 3.4 - 5.0 g/dL    Albumin/Globulin Ratio 2.6 (H) 1.1 - 2.2    Total Bilirubin 0.3 0.0 - 1.0 mg/dL    Alkaline Phosphatase 117 40 - 129 U/L    ALT 33 10 - 40 U/L    AST 21 15 - 37 U/L   Urinalysis with Reflex to Culture    Specimen: Urine, clean catch   Result Value Ref Range    Color, UA Straw Straw/Yellow    Clarity, UA Clear Clear    Glucose, Ur Negative Negative mg/dL    Bilirubin Urine Negative Negative    Ketones, Urine Negative Negative mg/dL    Specific Gravity, UA 1.010 1.005 - 1.030    Blood, Urine TRACE-INTACT (A) Negative    pH, UA 6.0 5.0 - 8.0    Protein, UA Negative Negative mg/dL    Urobilinogen, Urine 0.2 <2.0 E.U./dL    Nitrite, Urine Negative Negative    Leukocyte Esterase, Urine Negative Negative    Microscopic Examination YES     Urine Type NotGiven     Urine Reflex to Culture Not Indicated    Microscopic Urinalysis   Result Value Ref Range    WBC, UA 0-2 0 - 5 /HPF    RBC, UA 0-2 0 - 4 /HPF    Epithelial Cells, UA 0-1 0 - 5 /HPF    Bacteria, UA Rare (A) None Seen /HPF       CT HEAD WO CONTRAST   Final Result   No acute intracranial abnormality. I personally saw and performed a substantive portion of the visit including all aspects of the medical decision making. MDM:    Here the patient is in no acute distress. He is nontoxic in appearance.   His symptoms are quite chronic and he states they have been going on for 2 years but worsening over the past 6 months. His neurologic exam in the bed is reassuring, NIH stroke scale is 0. Patellar reflexes are normal.  I do not suspect something like Guillain-Barré syndrome. He is not having any back pain and I have low suspicion for cauda equina. No bowel or bladder symptoms. No fevers or risk factors to suggest epidural abscess or discitis. His gait is not ataxic and I have low suspicion for CVA but he will show me that his knees \"buckle\" when he is walking but the knee is able to stand himself up. He has not fallen here. Head CT is normal.  Lab work is reassuring. We offered admission for further neurologic evaluation. He would need to be transferred to [facility]. The patient has declined and states he would prefer to have an outpatient work-up. We will refer him for outpatient follow-up. He feels comfortable going home.      Faith Garcia MD  06/20/22 4508

## 2022-06-20 NOTE — TELEPHONE ENCOUNTER
Was told by the call center that this can't be done at the hospital anymore. Pt would have to see Dr. Ruy Kim for the EMG. Do you know anything about this?

## 2022-06-20 NOTE — ED PROVIDER NOTES
[address] ED  EMERGENCY DEPARTMENT ENCOUNTER        Pt Name: Jeremy Vernon  MRN: [id number]  [place] 1960  Date of evaluation: 6/20/2022  Provider: TRACI Rodriguez - CNP  PCP: Delores Freeman MD  Note Started: 3:44 PM EDT      YOUSIF. I have evaluated this patient. My supervising physician was available for consultation. Triage CHIEF COMPLAINT       Chief Complaint   Patient presents with    Extremity Weakness     Pt states that he has had increasing bl leg weakness over the last couple years that got worse today. HISTORY OF PRESENT ILLNESS   (Location/Symptom, Timing/Onset, Context/Setting, Quality, Duration, Modifying Factors, Severity)  Note limiting factors. Chief Complaint: Bilateral leg weakness    Jeremy Vernon is a 59 y.o. male who presents to the emergency department with symptoms of general weakness in both legs. He reported that last couple years he has had symptoms of bilateral lower extremity weakness and reported he did have a fall today without injury. States that symptoms have been ongoing for approximately 2 years but seem to be worse in the last 6 months. .  He denies any symptoms of arm weakness. No speech changes. No numbness or tingling. No back pain. Denies any abdominal complaints. Denies any fevers or unexplained weight loss. No dysuria or difficulty urinating. No symptoms of incomplete urination. Nursing Notes were all reviewed and agreed with or any disagreements were addressed in the HPI. REVIEW OF SYSTEMS    (2-9 systems for level 4, 10 or more for level 5)     Review of Systems   Constitutional: Negative for chills, diaphoresis and fever. HENT: Negative for congestion, ear pain, rhinorrhea and sore throat. Eyes: Negative for pain and visual disturbance. Respiratory: Negative for cough and shortness of breath. Cardiovascular: Negative for chest pain and leg swelling.    Gastrointestinal: Negative for abdominal pain, blood in stool, diarrhea, nausea and vomiting. Genitourinary: Negative for difficulty urinating, dysuria, flank pain and frequency. Musculoskeletal: Negative for back pain and neck pain. Skin: Negative for rash and wound. Neurological: Positive for weakness. Negative for dizziness and light-headedness. PAST MEDICAL HISTORY     Past Medical History:   Diagnosis Date    Acid reflux     Cancer ([facility]     Cholelithiasis 03/19/2019    Depression     DM (diabetes mellitus) ([facility]     History of left bundle branch block (LBBB)     HX OTHER MEDICAL     \"blood doesn't flow fast enough into heart\"    Wears dentures        SURGICAL HISTORY     Past Surgical History:   Procedure Laterality Date    APPENDECTOMY      CHOLECYSTECTOMY, LAPAROSCOPIC N/A 3/19/2019    LAPAROSCOPIC CHOLECYSTECTOMY WITH INTRAOPERATIVE, CHOLANGIOGRAM performed by Kelsey Mak MD at [address] [place] [address]       Discharge Medication List as of 6/20/2022  5:51 PM      CONTINUE these medications which have NOT CHANGED    Details   FLUoxetine (PROZAC) 20 MG capsule TAKE 1 CAPSULE BY MOUTH EVERY DAY, Disp-30 capsule, R-5Normal      pramipexole (MIRAPEX) 0.125 MG tablet Take 1 tablet by mouth nightly, Disp-90 tablet, R-3Normal      colestipol (COLESTID) 1 g tablet Take 1 tablet by mouth 2 times daily, Disp-180 tablet, R-3Normal      gabapentin (NEURONTIN) 300 MG capsule Take 1 capsule by mouth nightly for 90 days.  Intended supply: 30 days, Disp-90 capsule, R-2Normal      fluticasone (FLONASE) 50 MCG/ACT nasal spray 2 sprays by Each Nostril route daily, Disp-16 g, R-5Normal      montelukast (SINGULAIR) 10 MG tablet Take 1 tablet by mouth daily, Disp-30 tablet, R-3Normal      albuterol sulfate HFA (VENTOLIN HFA) 108 (90 Base) MCG/ACT inhaler Inhale 2 puffs into the lungs 4 times daily as needed for Wheezing, Disp-18 g, R-5Normal      sildenafil (VIAGRA) 50 MG tablet Take 1 tablet by mouth daily as needed for Erectile Dysfunction, Disp-10 tablet,R-0Normal             ALLERGIES     Patient has no known allergies. FAMILYHISTORY       Family History   Problem Relation Age of Onset    Pneumonia Mother     COPD Mother     Cancer Father         lung    Mult Sclerosis Maternal Aunt         SOCIAL HISTORY       Social History     Socioeconomic History    Marital status:      Spouse name: None    Number of children: None    Years of education: None    Highest education level: None   Occupational History    None   Tobacco Use    Smoking status: Current Every Day Smoker     Packs/day: 1.00     Years: 46.00     Pack years: 46.00     Types: Cigarettes    Smokeless tobacco: Never Used   Vaping Use    Vaping Use: Never used   Substance and Sexual Activity    Alcohol use: No    Drug use: No    Sexual activity: Yes     Partners: Female   Other Topics Concern    None   Social History Narrative    None     Social Determinants of Health     Financial Resource Strain:     Difficulty of Paying Living Expenses: Not on file   Food Insecurity:     Worried About Running Out of Food in the Last Year: Not on file    Lottie of Food in the Last Year: Not on file   Transportation Needs:     Lack of Transportation (Medical): Not on file    Lack of Transportation (Non-Medical):  Not on file   Physical Activity:     Days of Exercise per Week: Not on file    Minutes of Exercise per Session: Not on file   Stress:     Feeling of Stress : Not on file   Social Connections:     Frequency of Communication with Friends and Family: Not on file    Frequency of Social Gatherings with Friends and Family: Not on file    Attends Roman [employer] Services: Not on file    Active Member of Clubs or Organizations: Not on file    Attends Club or Organization Meetings: Not on file    Marital Status: Not on file   Intimate Partner Violence:     Fear of Current or Ex-Partner: Not on file    Emotionally Abused: Not on file   [facility] Physically Abused: Not on file    Sexually Abused: Not on file   Housing Stability:     Unable to Pay for Housing in the Last Year: Not on file    Number of Places Lived in the Last Year: Not on file    Unstable Housing in the Last Year: Not on file       SCREENINGS   NIH Stroke Scale  Interval: Baseline  Level of Consciousness (1a): Alert  LOC Questions (1b): Answers both correctly  LOC Commands (1c): Performs both tasks correctly  Best Gaze (2): Normal  Visual (3): No visual loss  Facial Palsy (4): Normal symmetrical movement  Motor Arm, Left (5a): No drift  Motor Arm, Right (5b): No drift  Motor Leg, Left (6a): No drift  Motor Leg, Right (6b): No drift  Limb Ataxia (7): Absent  Sensory (8): Normal  Best Language (9): No aphasia  Dysarthria (10): Normal  Extinction and Inattention (11): No abnormality  Total: 0Glasgow Coma Scale  Eye Opening: Spontaneous  Best Verbal Response: Oriented  Best Motor Response: Obeys commands  Maranda Coma Scale Score: 15        PHYSICAL EXAM    (up to 7 for level 4, 8 or more for level 5)     ED Triage Vitals [06/20/22 1455]   BP Temp Temp Source Heart Rate Resp SpO2 Height Weight   134/78 98.6 °F (37 °C) Oral 72 16 98 % -- --       Physical Exam  Vitals and nursing note reviewed. Constitutional:       Appearance: Normal appearance. He is not toxic-appearing or diaphoretic. HENT:      Head: Normocephalic and atraumatic. Nose: Nose normal.   Eyes:      General: No visual field deficit. Right eye: No discharge. Left eye: No discharge. Cardiovascular:      Rate and Rhythm: Normal rate and regular rhythm. Pulses: Normal pulses. Heart sounds: No murmur heard. Pulmonary:      Effort: Pulmonary effort is normal. No respiratory distress. Breath sounds: No wheezing or rhonchi. Abdominal:      Palpations: Abdomen is soft. Tenderness: There is no abdominal tenderness. There is no guarding or rebound.    Musculoskeletal:         General: Normal range of motion. Cervical back: Normal range of motion and neck supple. Skin:     General: Skin is warm and dry. Capillary Refill: Capillary refill takes less than 2 seconds. Neurological:      General: No focal deficit present. Mental Status: He is alert and oriented to person, place, and time. GCS: GCS eye subscore is 4. GCS verbal subscore is 5. GCS motor subscore is 6. Cranial Nerves: Cranial nerves are intact. No dysarthria or facial asymmetry. Sensory: Sensation is intact. No sensory deficit. Motor: Motor function is intact. No weakness, tremor or pronator drift. Gait: Gait abnormal.      Deep Tendon Reflexes:      Reflex Scores:       Patellar reflexes are 2+ on the right side and 2+ on the left side. Psychiatric:         Mood and Affect: Mood normal.         Behavior: Behavior normal.         DIAGNOSTIC RESULTS   LABS:    Labs Reviewed   CBC WITH AUTO DIFFERENTIAL - Abnormal; Notable for the following components:       Result Value    Hematocrit 39.3 (*)     All other components within normal limits   COMPREHENSIVE METABOLIC PANEL - Abnormal; Notable for the following components:    Sodium 135 (*)     Glucose 152 (*)     CREATININE 0.6 (*)     Albumin/Globulin Ratio 2.6 (*)     All other components within normal limits   URINALYSIS WITH REFLEX TO CULTURE - Abnormal; Notable for the following components:    Blood, Urine TRACE-INTACT (*)     All other components within normal limits   MICROSCOPIC URINALYSIS - Abnormal; Notable for the following components:    Bacteria, UA Rare (*)     All other components within normal limits   MAGNESIUM       When ordered, only abnormal lab results are displayed. All other labs were within normal range or not returned as of this dictation. EKG: When ordered, EKG's are interpreted by the Emergency Department Physician in the absence of a cardiologist.  Please see their note for interpretation of EKG.       RADIOLOGY: Non-plain film images such as CT, Ultrasound and MRI are read by the radiologist. Plain radiographic images are visualized andpreliminarily interpreted by the  ED Provider with the below findings:        Interpretation perthe Radiologist below, if available at the time of this note:    CT HEAD WO CONTRAST   Final Result   No acute intracranial abnormality. No results found. PROCEDURES   Unless otherwise noted below, none     Procedures    CRITICAL CARE TIME   N/A    CONSULTS:  None      EMERGENCY DEPARTMENT COURSE and DIFFERENTIAL DIAGNOSIS/MDM:   Vitals:    Vitals:    06/20/22 1601 06/20/22 1702 06/20/22 1756 06/20/22 1806   BP: 127/81 124/79     Pulse: 66 63 63    Resp: 15 16 17 18   Temp:       TempSrc:       SpO2: 97% 98% 97%        Patient was given thefollowing medications:  Medications - No data to display      Is this patient to be included in the SEP-1 Core Measure due to severe sepsis or septic shock? No   Exclusion criteria - the patient is NOT to be included for SEP-1 Core Measure due to: Infection is not suspected    The patient was evaluated here in the emergency department with NIH score of 0. The patient has 5-5 strength in the lower extremities. The patient is otherwise well-appearing. Had a detailed discussion with the patient involving his CT scan results which were read as negative, laboratory findings which were quite unremarkable, and urinalysis which was unremarkable. The patient also had a report of 0 mL postvoid residual.  Has no back pain. Patient was ambulatory here in the emergency department with standby assist.  The patient was offered admission and transfer for neurological evaluation as he has been having weakness in the lower extremities for approximately 2 years which seem to have worsened in the last 6 months. Currently the patient states that he would like to follow-up as an outpatient with neurology.   Again I provided him admission and transfer and he declined. He states that he would rather follow-up as an outpatient. FINAL IMPRESSION      1.  Leg weakness, bilateral          DISPOSITION/PLAN   DISPOSITION Decision To Discharge 06/20/2022 05:29:43 PM      PATIENT REFERREDTO:  Delon Gonzalez [address] [facility]  [phone]    Schedule an appointment as soon as possible for a visit       Brian Williamson, [address] [place] [address]  [phone]    Schedule an appointment as soon as possible for a visit       [facility] [place] [facility]  [address] [address]  [phone]  Go to   If symptoms worsen      DISCHARGE MEDICATIONS:  Discharge Medication List as of 6/20/2022  5:51 PM          DISCONTINUED MEDICATIONS:  Discharge Medication List as of 6/20/2022  5:51 PM                 (Please note that portions ofthis note were completed with a voice recognition program.  Efforts were made to edit the dictations but occasionally words are mis-transcribed.)    TRACI Herrmann CNP (electronically signed)            TRACI Herrmann CNP  06/21/22 8395

## 2022-06-20 NOTE — TELEPHONE ENCOUNTER
Pt states medications for his legs (pramipexole and gabapentin) are not working. His legs have been giving out. Muscles hurt. Having trouble sleeping. Please contact pt at [phone].

## 2022-06-22 RX ORDER — GABAPENTIN 300 MG/1
300 CAPSULE ORAL 3 TIMES DAILY
Qty: 270 CAPSULE | Refills: 2 | Status: SHIPPED | OUTPATIENT
Start: 2022-06-22 | End: 2022-10-13 | Stop reason: SDUPTHER

## 2022-06-22 NOTE — TELEPHONE ENCOUNTER
Spoke to central scheduling and this is correct. The doctor that does the [facility] at [facility] and [address] is retiring. They have not replaced him yet. Pt's can schedule w a neurologist or be referred to [address] [place] [address] [phone] for this testing.

## 2022-06-22 NOTE — TELEPHONE ENCOUNTER
We were being able to do EMG's at the hospital.  Can we reach out to appropriate department to see why this is no longer an option?

## 2022-08-24 ENCOUNTER — PROCEDURE VISIT (OUTPATIENT)
Dept: AUDIOLOGY | Age: 62
End: 2022-08-24
Payer: MEDICAID

## 2022-08-24 DIAGNOSIS — R42 DIZZINESS: ICD-10-CM

## 2022-08-24 DIAGNOSIS — H90.3 SENSORINEURAL HEARING LOSS, BILATERAL: Primary | ICD-10-CM

## 2022-08-24 PROCEDURE — 92567 TYMPANOMETRY: CPT | Performed by: AUDIOLOGIST

## 2022-08-24 PROCEDURE — 92557 COMPREHENSIVE HEARING TEST: CPT | Performed by: AUDIOLOGIST

## 2022-08-24 NOTE — PATIENT INSTRUCTIONS
Good Communication Strategies    Communication can be challenging for anyone, but can be especially difficult for those with some degree of hearing loss. While we may not be able to control every factor that may lead to difficulty with communication, there are Good Communication Strategies that we can all use in our day-to-day lives. Communication takes both parties working together for it to be successful. Tips as a Listener:   Control your environment. It is important to limit the amount of background noise in the room when possible. You should also consider having a good light source in the room to best see the other person. Ask for clarification. Instead of saying \"What?\", you can use parts of what you heard to make a new question. For example, if you heard the word \"Thursday\" but not the rest of the week, you may ask \"What was that about Thursday? \" or \"What did you want to do Thursday? \". This shows the person talking that you are listening and will help them better explain what they are saying. Be an advocate for yourself. If you are hearing but not understanding, tell the other person \"I can hear you, but I need you to slow down when you speak. \"  Or if someone is facing the other direction, say \"I cannot hear you when you are not looking at me when we talk. \"       Tips as a Talker:   - Sit or stand 3 to 6 feet away to maximize audibility         -- It is unrealistic to believe someone else will fully hear your message if you are speaking from across the room or in a different room in the house   - Stay at eye level to help with visual cues   - Make sure you have the persons attention before speaking   - Use facial expressions and gestures to accentuate your message   - Raise your voice slightly (do not scream)   - Speak slowly and distinctly   - Use short, simple sentences   - Rephrase your words if the person is having a hard time understanding you    - To avoid distortion, dont speak directly into a persons ear      Some additional items that may be helpful:   - Remain patient - this is important for both parties   - Write down items that still cannot be heard/understood. You may write with pen/paper or consider typing/texting on a cell phone or smart device. - If background noise is unavoidable, try to keep yourself in a good position in the room. By sitting at a hernandez on the side of the restaurant (preferably a corner), it will be easier to communicate than if you were sitting at a table in the middle with background noise surrounding you. Keep yourself positioned away from music speakers or heavy foot traffic. Hearing Loss: Care Instructions  Your Care Instructions      Hearing loss is a sudden or slow decrease in how well you hear. It can range from mild to profound. Permanent hearing loss can occur with aging, and it can happen when you are exposed long-term to loud noise. Examples include listening to loud music, riding motorcycles, or being around other loud machines. Hearing loss can affect your work and home life. It can make you feel lonely or depressed. You may feel that you have lost your independence. But hearing aids and other devices can help you hear better and feel connected to others. Follow-up care is a key part of your treatment and safety. Be sure to make and go to all appointments, and call your doctor if you are having problems. It's also a good idea to know your test results and keep a list of the medicines you take. How can you care for yourself at home? Avoid loud noises whenever possible. This helps keep your hearing from getting worse. Always wear hearing protection around loud noises. If appropriate, wear hearing aid(s) as directed. It is recommended that hearing aids are worn during all waking hours to keep your brain active and give it access to the sounds it is missing.       If you are beginning your process with hearing aid(s), schedule a \"Hearing Aid Evaluation\" with an audiologist to discuss your lifestyle, features of hearing aid technology, and styles of hearing aids available. It is recommended that you contact your insurance company to determine if you have a hearing aid benefit, as this may dictate who you can see for these services. Have hearing tests as your doctor suggests. They can show whether your hearing has changed. Your hearing aid may need to be adjusted. Use other assistive devices as needed. These may include:  Telephone amplifiers and hearing aids that can connect to a television, stereo, radio, or microphone. Devices that use lights or vibrations. These alert you to the doorbell, a ringing telephone, or a baby monitor. Television closed-captioning. This shows the words at the bottom of the screen. Most new TVs can do this. TTY (text telephone). This lets you type messages back and forth on the telephone instead of talking or listening. These devices are also called TDD. When messages are typed on the keyboard, they are sent over the phone line to a receiving TTY. The message is shown on a monitor. Use pagers, fax machines, text, and email if it is hard for you to communicate by telephone. Try to learn a listening technique called speech-reading. It is not lip-reading. You pay attention to people's gestures, expressions, posture, and tone of voice. These clues can help you understand what a person is saying. Face the person you are talking to, and have him or her face you. Make sure the lighting is good. You need to see the other person's face clearly. Think about counseling if you need help to adjust to your hearing loss. When should you call for help? Watch closely for changes in your health, and be sure to contact your doctor if:    You think your hearing is getting worse. You have new symptoms, such as dizziness or nausea.           Noise-Induced Hearing Loss  What it is, and what you can do to prevent it      Exposure to loud sounds, in an occupational setting or recreational, can cause permanent hearing loss. Sound is measured in decibels (dB). Noise-induced hearing loss is the ONLY type of preventable hearing loss. Hearing loss related to noise exposure can occur at any age. There are small sensory cells, called inner and outer hair cells, within the inner ear (cochlea). These cells process the loudness (intensity) and pitch (frequency) of sound and send the signal to the brain via our auditory nerve (vestibulocochlear nerve, cranial nerve VIII). When these cells are damaged, they can result in permanent hearing loss and/or tinnitus. The hair cells responsible for high frequency sounds, like birds chirping, are most likely to be damaged due to loud sounds. The high frequency sounds are also very important for our clarity and understanding of speech. OCCUPATIONAL NOISE EXPOSURE RECREATIONAL NOISE EXPOSURE   Some jobs may have exposure to loud sounds in the workplace. These jobs may include but are not limited to:  [address] [place] [address] [place] settings  Manufacturing  Construction  Welding  Landscaping  Hairdressing/hairstyling  [facility]   . .. And more! Many activities outside of work may cause permanent hearing loss. These activities may include but are not limited to:  Lawnmowers, leaf blowers  Farming equipment and animals (such as pigs squealing)  Chainsaws and other power tools  Playing musical instruments and/or singing  Listening to music too loudly - at concerts, through stereo, through ear buds or headphones  Attending sporting events  Attending fireworks shows or using fireworks at home  Use of firearms  . .. And more! REDUCE OR PROTECT YOUR EARS FROM NOISE EXPOSURE    To do your best to avoid noise-induced hearing loss, here are some tips:  Limit exposure to loud sounds. 85 dB (decibels) is safe for 8 hours.   As sounds are louder, the length of time the sound is safe lessens. These numbers are cumulative across a 24-hour period. (NIOSH and CDC, 2002)  85 dB is safe for 8 hours  88 dB is safe for 4 hours  91 dB is safe for 2 hours  94 dB is safe for 1 hour  97 dB is safe for 30 minutes  100 dB is safe for 15 minutes  103 dB is safe for 7.5 minutes  106 dB is safe for 3.75 minutes  109 dB is safe for LESS THAN 2 minutes  112 dB is safe for LESS THAN 1 minute  115 dB is safe for ~ 30 seconds  130 dB can cause IMMEDIATE hearing loss  If you are unsure if a sound is too loud, consider checking the sound level with a \"sound level meter\". There are apps on smart devices that can measure the loudness of the sound. They are not as accurate as expensive equipment used by scientists, but it will give you a guesstimate of how loud the sound is, and if it may be damaging to your hearing. If you cannot avoid loud sounds, here are ways to reduce your exposure:  1. Wear hearing protection  Ear plugs and protective ear muffs can be used to reduce the intensity of the sound. The higher the NRR (noise reduction rating), the better reduction of the intensity of the sound   2. Turn the volume down  When listening to music, turn the volume down, especially when wearing ear buds or headphones. A good rule of thumb is to not go beyond the middle setting on your device. If you can't hear someone talking to you from arm's length away, your music may be at a level that it can cause damage. If someone else can hear your music from 3 feet away, it may also be at a level that it can cause damage. 3. Walk away from the sound  If you do not have the ability to wear hearing protection or turn down the volume of the sound, you should do your best to move away from the source of the sound. Sound decreases in intensity as we move further from the source. The sound will decrease by 6 dB for every doubling of distance from the sound source.     Exposure to these sounds may cause permanent damage to your hearing. If you suspect your hearing has changed, it is recommended that you have your hearing tested by your audiologist.   Dizziness: Care Instructions  Your Care Instructions  Dizziness is the feeling of unsteadiness or fuzziness in your head. It is different than having vertigo, which is a feeling that the room is spinning or that you are moving or falling. It is also different from lightheadedness, which is the feeling that you are about to faint. It can be hard to know what causes dizziness. Some people feel dizzy when they have migraine headaches. Sometimes bouts of flu can make you feel dizzy. Some medical conditions, such as heart problems or high blood pressure, can make you feel dizzy. Many medicines can cause dizziness, including medicines for high blood pressure, pain, or anxiety. If a medicine causes your symptoms, your doctor may recommend that you stop or change the medicine. If it is a problem with your heart, you may need medicine to help your heart work better. If there is no clear reason for your symptoms, your doctor may suggest watching and waiting for a while to see if the dizziness goes away on its own. Follow-up care is a key part of your treatment and safety. Be sure to make and go to all appointments, and call your doctor if you are having problems. It's also a good idea to know your test results and keep a list of the medicines you take. How can you care for yourself at home? If your doctor recommends or prescribes medicine, take it exactly as directed. Call your doctor if you think you are having a problem with your medicine. Do not drive while you feel dizzy. Try to prevent falls. Steps you can take include:  Using nonskid mats, adding grab bars near the tub, and using night-lights. Clearing your home so that walkways are free of anything you might trip on. Letting family and friends know that you have been feeling dizzy.  This will help them know how to help you.    When should you call for help? Call 911 anytime you think you may need emergency care. For example, call if:    You passed out (lost consciousness). You have dizziness along with symptoms of a heart attack. These may include:  Chest pain or pressure, or a strange feeling in the chest.  Sweating. Shortness of breath. Nausea or vomiting. Pain, pressure, or a strange feeling in the back, neck, jaw, or upper belly or in one or both shoulders or arms. Lightheadedness or sudden weakness. A fast or irregular heartbeat. You have symptoms of a stroke. These may include:  Sudden numbness, tingling, weakness, or loss of movement in your face, arm, or leg, especially on only one side of your body. Sudden vision changes. Sudden trouble speaking. Sudden confusion or trouble understanding simple statements. Sudden problems with walking or balance. A sudden, severe headache that is different from past headaches. Call your doctor now or seek immediate medical care if:    You feel dizzy and have a fever, headache, or ringing in your ears. You have new or increased nausea and vomiting. Your dizziness does not go away or comes back. Watch closely for changes in your health, and be sure to contact your doctor if:    You do not get better as expected. Where can you learn more? Go to https://[employer]form[employer].Wink. org and sign in to your [employer] account. Enter R494 in the [facility] box to learn more about \"Dizziness: Care Instructions. \"     If you do not have an account, please click on the \"Sign Up Now\" link. Current as of: September 23, 2018  Content Version: 11.9  © 5184-0100 [place] [employer]. Care instructions adapted under license by [facility] ([facility]). If you have questions about a medical condition or this instruction, always ask your healthcare professional. [place] [address] any warranty or liability for your use of this information.

## 2022-08-24 NOTE — Clinical Note
Dr. Rodney Low,  Thank you for your referral for audiometric testing on this patient. Today's results revealed a symmetric sensorineural hearing loss with poor word recognition, bilaterally. Hearing loss significant enough to create hearing difficulty in at least some listening situations. Discussed benefits of amplification and possible consult with ENT physician due to imbalance. Recommended patient contact insurance to determine possible hearing aid benefit. Please see the scanned audiogram (under \"Media\" tab) and encounter note for details. If you have any questions, or if there is anything else you need, please let me know.    Lamar Hitchcock Audiologist --- [address] [place] [address] ENT - Audiology

## 2022-08-24 NOTE — PROGRESS NOTES
Stefani Hsu   1960, 57 y.o. male   [id number]       Referring Provider: Inge Sun MD   Referral Type: In an order in [address] [place] [address]    Reason for Visit: Evaluation of suspected change in hearing, tinnitus, or balance. ADULT AUDIOLOGIC EVALUATION      Stefani Hsu is a 57 y.o. male seen today, 8/24/2022 , for an initial audiologic evaluation. Patient was accompanied by his wife. AUDIOLOGIC AND OTHER PERTINENT MEDICAL HISTORY:      Stefani Hsu noted decreased hearing, bilaterally for the past 10+ years. He notes history of noise exposure throughout most of life and family history of hearing loss in many family members. Patient also reports use of cane to assist with balance for the past several months-denies dizziness, falls, or head injuries. No additional significant otologic or medical history was reported. Stefani Hsu denied otalgia, aural fullness, otorrhea, tinnitus, history of falls, history of head trauma, and history of ear surgery. Date: 8/24/2022     IMPRESSIONS:      Today's results revealed a symmetric sensorineural hearing loss with poor word recognition, bilaterally. Hearing loss significant enough to create hearing difficulty in at least some listening situations. Discussed benefits of amplification and possible consult with ENT physician due to imbalance. Recommended patient contact insurance to determine possible hearing aid benefit. ASSESSMENT AND FINDINGS:     Otoscopy revealed: Clear ear canals bilaterally    RIGHT EAR:  Hearing Sensitivity: Severe to profound sensorineural hearing loss. Speech Recognition Threshold: 70 dB HL  Word Recognition: Very Poor (52%), based on NU-6 25-word list at 105 dBHL masked using recorded speech stimuli. Tympanometry: Normal peak pressure and compliance, Type A tympanogram, consistent with normal middle ear function. LEFT EAR:  Hearing Sensitivity: Moderately-severe to profound sensorineural hearing loss.    Speech Recognition Threshold: 65 dB HL  Word Recognition: Very Poor (52%), based on NU-6 25-word list at 105 dBHL masked using recorded speech stimuli. Tympanometry: Normal peak pressure and compliance, Type A tympanogram, consistent with normal middle ear function. Reliability: Good   Transducer: Inserts    See scanned audiogram dated 8/24/2022  for results. PATIENT EDUCATION:       The following items were discussed with the patient:   - Good Communication Strategies  - Hearing Loss and Hearing Aids  - Noise-Induced Hearing Loss and use of Hearing Protection Devices (HPDs)   - Dizziness    Educational information was shared in the After Visit Summary. RECOMMENDATIONS:                                                                                                                                                                                                                                                            The following items are recommended based on patient report and results from today's appointment:   - Continue medical follow-up with Dorinda Baird MD .   - Retest hearing as medically indicated and/or sooner if a change in hearing is noted. - Possible consult with ENT physician due to noted imbalance. - If desired, schedule a Hearing Aid Evaluation (HAE) appointment to discuss hearing aid options. - Utilize \"Good Communication Strategies\" as discussed to assist in speech understanding with communication partners. - Use hearing protection devices (HPDs), such as protective ear muffs and ear plugs, when exposed to dangerous sound levels. - If medically indicated, consider vestibular evaluation to further investigate symptoms of dizziness.        Lamar El  Audiologist    Chart CC'd to: Dorinda Baird MD       Degree of   Hearing Sensitivity dB Range   Within Normal Limits (WNL) 0 - 20   Mild 20 - 40   Moderate 40 - 55   Moderately-Severe 55 - 70   Severe 70 - 90   Profound 90 +

## 2022-09-09 DIAGNOSIS — F32.A DEPRESSION, UNSPECIFIED DEPRESSION TYPE: ICD-10-CM

## 2022-09-09 RX ORDER — FLUOXETINE HYDROCHLORIDE 20 MG/1
CAPSULE ORAL
Qty: 30 CAPSULE | Refills: 5 | Status: SHIPPED | OUTPATIENT
Start: 2022-09-09

## 2022-09-09 NOTE — TELEPHONE ENCOUNTER
Patient is requesting refill on    FLUoxetine (PROZAC) 20 MG capsule      [facility]    Last Office Visit  -  6.14.22 Jose A Arevalo  Next Office Visit  -

## 2022-10-12 ENCOUNTER — TELEPHONE (OUTPATIENT)
Dept: FAMILY MEDICINE CLINIC | Age: 62
End: 2022-10-12

## 2022-10-12 NOTE — TELEPHONE ENCOUNTER
Chart audit shows patient had a positive (abnormal) Cologuard test completed 03-25-22. Audit shows results were entered correctly, ordering physician/APC reviewed and follow up plan in place.

## 2022-10-13 DIAGNOSIS — G25.81 RLS (RESTLESS LEGS SYNDROME): ICD-10-CM

## 2022-10-13 RX ORDER — GABAPENTIN 300 MG/1
300 CAPSULE ORAL 3 TIMES DAILY
Qty: 270 CAPSULE | Refills: 2 | Status: SHIPPED | OUTPATIENT
Start: 2022-10-13 | End: 2023-01-11

## 2022-10-13 NOTE — TELEPHONE ENCOUNTER
Pt requesting Prozac Refill. FLUoxetine (PROZAC) 20 MG capsule     [facility]/pharmacy #[id number]- [place], [place] - [address] [place] [address] [phone]     Last Office Visit  -  6/21/22  Next Office Visit  -  10/13/22

## 2022-10-13 NOTE — TELEPHONE ENCOUNTER
Spoke to the pharmacy and they stated that they can't fill it because it was picked up from another pharmacy on 10/4. I spoke to Pt's wife (on HIPAA) and explained the situation. She will reach out to [facility] to see if it was in his pill pack and the insurance as suggested by the pharmacist.   Gabapentin pended.

## 2022-10-13 NOTE — TELEPHONE ENCOUNTER
Gillian [place] [phone] (home)    is requesting refill(s) of Gabapentin    to preferred pharmacy [facility] [address]    Last OV 6/14/22  Last refill  9/9/22    Pharmacy is refusing to refill pts prozac as well, pt states they told him it was the office not his insurance.

## 2022-10-17 ENCOUNTER — SCHEDULED TELEPHONE ENCOUNTER (OUTPATIENT)
Dept: FAMILY MEDICINE CLINIC | Age: 62
End: 2022-10-17
Payer: MEDICAID

## 2022-10-17 DIAGNOSIS — G25.81 RESTLESS LEGS: ICD-10-CM

## 2022-10-17 PROCEDURE — 99442 PR PHYS/QHP TELEPHONE EVALUATION 11-20 MIN: CPT | Performed by: FAMILY MEDICINE

## 2022-10-17 RX ORDER — PRAMIPEXOLE DIHYDROCHLORIDE 0.25 MG/1
0.25 TABLET ORAL NIGHTLY
Qty: 30 TABLET | Refills: 3 | Status: SHIPPED | OUTPATIENT
Start: 2022-10-17 | End: 2022-11-07 | Stop reason: SDUPTHER

## 2022-10-17 NOTE — PROGRESS NOTES
Sushil Nesbitt is a 57 y.o. male evaluated via telephone on 10/17/2022 for Medication Check  . Assessment & Plan   1. Restless legs  -     pramipexole (MIRAPEX) 0.25 MG tablet; Take 1 tablet by mouth nightly, Disp-30 tablet, R-3Normal  No follow-ups on file. Subjective   Prior to Visit Medications    Medication Sig Taking? Authorizing Provider   pramipexole (MIRAPEX) 0.25 MG tablet Take 1 tablet by mouth nightly Yes Elroy Sanders MD   gabapentin (NEURONTIN) 300 MG capsule Take 1 capsule by mouth 3 times daily for 90 days. Intended supply: 30 days Yes Elroy Sanders MD   FLUoxetine (PROZAC) 20 MG capsule TAKE 1 CAPSULE BY MOUTH EVERY DAY Yes Elroy Sanders MD   colestipol (COLESTID) 1 g tablet Take 1 tablet by mouth 2 times daily Yes Elroy Sanders MD   fluticasone (FLONASE) 50 MCG/ACT nasal spray 2 sprays by Each Nostril route daily Yes Elroy Sanders MD   montelukast (SINGULAIR) 10 MG tablet Take 1 tablet by mouth daily Yes Elroy Sanders MD   albuterol sulfate HFA (VENTOLIN HFA) 108 (90 Base) MCG/ACT inhaler Inhale 2 puffs into the lungs 4 times daily as needed for Wheezing Yes Elroy Sanders MD   sildenafil (VIAGRA) 50 MG tablet Take 1 tablet by mouth daily as needed for Erectile Dysfunction Yes Elroy Sanders MD     Sushil Nesbitt is a 57 y.o. male. Patient presents with:  Medication Check      68-year-old male who has been having increased restless leg syndrome. Patient notes that this is worsening over time. He notes that the medicines have been helpful in the past but have not more effectiveness recently. Patient notes that he is often up at night with move his legs. Denies any other significant illnesses or concerns at this time. The patients PMH, surgical history, family history, medications, allergies were all reviewed and updated as appropriate today.         Review of Systems      Objective   Patient-Reported Vitals  No data recorded       Total Time: minutes: 11-20 minutes     Domonique Samayoa Dom Gordon was evaluated through a synchronous (real-time) audio only encounter. Patient identification was verified at the start of the visit. He (or guardian if applicable) is aware that this is a billable service, which includes applicable co-pays. This visit was conducted with patient's (and/or legal guardian's) verbal consent. He has not had a related appointment within my department in the past 7 days or scheduled within the next 24 hours. The patient was located at Home: [address] [place] [address]. The provider was located at [facility] (Appt Dept): [address] [place] [address].      Fariba Wu MD

## 2022-10-28 ENCOUNTER — TELEPHONE (OUTPATIENT)
Dept: FAMILY MEDICINE CLINIC | Age: 62
End: 2022-10-28

## 2022-10-28 NOTE — TELEPHONE ENCOUNTER
Pharmacy needs clarification on the   pramipexole (MIRAPEX) 0.25 MG tablet    They want to know if patient is taking this and the   pramipexole (MIRAPEX) 0.125 MG tablet     Please call [phone]  Ref # [id number]

## 2022-11-07 DIAGNOSIS — G25.81 RESTLESS LEGS: ICD-10-CM

## 2022-11-07 RX ORDER — PRAMIPEXOLE DIHYDROCHLORIDE 0.25 MG/1
0.25 TABLET ORAL NIGHTLY
Qty: 30 TABLET | Refills: 3 | Status: SHIPPED | OUTPATIENT
Start: 2022-11-07

## 2022-11-07 NOTE — TELEPHONE ENCOUNTER
Last Office Visit  -  10/17/22  Next Office Visit  -  none    Last Filled  -  10/17/22  Last UDS -    Contract -

## 2022-11-21 ENCOUNTER — OFFICE VISIT (OUTPATIENT)
Dept: FAMILY MEDICINE CLINIC | Age: 62
End: 2022-11-21
Payer: MEDICAID

## 2022-11-21 VITALS
HEIGHT: 70 IN | SYSTOLIC BLOOD PRESSURE: 124 MMHG | WEIGHT: 157 LBS | HEART RATE: 83 BPM | OXYGEN SATURATION: 97 % | DIASTOLIC BLOOD PRESSURE: 76 MMHG | BODY MASS INDEX: 22.48 KG/M2

## 2022-11-21 DIAGNOSIS — R25.1 TREMOR: ICD-10-CM

## 2022-11-21 DIAGNOSIS — R29.898 WEAKNESS OF BOTH LOWER EXTREMITIES: Primary | ICD-10-CM

## 2022-11-21 PROCEDURE — G8420 CALC BMI NORM PARAMETERS: HCPCS | Performed by: FAMILY MEDICINE

## 2022-11-21 PROCEDURE — 99214 OFFICE O/P EST MOD 30 MIN: CPT | Performed by: FAMILY MEDICINE

## 2022-11-21 PROCEDURE — G8484 FLU IMMUNIZE NO ADMIN: HCPCS | Performed by: FAMILY MEDICINE

## 2022-11-21 PROCEDURE — G8427 DOCREV CUR MEDS BY ELIG CLIN: HCPCS | Performed by: FAMILY MEDICINE

## 2022-11-21 PROCEDURE — 3017F COLORECTAL CA SCREEN DOC REV: CPT | Performed by: FAMILY MEDICINE

## 2022-11-21 PROCEDURE — 4004F PT TOBACCO SCREEN RCVD TLK: CPT | Performed by: FAMILY MEDICINE

## 2022-11-21 NOTE — PROGRESS NOTES
Paramjit Scale (:  1960) is a 57 y.o. male,Established patient, here for evaluation of the following chief complaint(s):  Extremity Weakness (Lt leg )         ASSESSMENT/PLAN:  1. Weakness of both lower extremities  -     MRI BRAIN W WO CONTRAST; Future  2. Tremor  -     MRI BRAIN W WO CONTRAST; Future    No follow-ups on file. Subjective   SUBJECTIVE/OBJECTIVE:  Paramjit Levy is a 57 y.o. male. Patient presents with:  Extremity Weakness: Lt leg     78-year-old male who is exhibiting increased weakness in his extremities bilaterally but left greater than right. Patient notes that he has been stumbling more. He has fallen a couple times. He has also noticed increased tremor. Patient notes that he has having more tremor with fine activities. Having trouble when he touches his face. Patient notes no resting tremor. He has had no significant issues with forgetfulness. He has had no confusion. Patient notes no upper extremity weakness. The patients PMH, surgical history, family history, medications, allergies were all reviewed and updated as appropriate today. Extremity Weakness      Review of Systems       Objective   Physical Exam  Vitals and nursing note reviewed. Constitutional:       Appearance: Normal appearance. He is well-developed. HENT:      Head: Normocephalic and atraumatic. Right Ear: External ear normal.      Left Ear: External ear normal.      Nose: Nose normal.   Eyes:      Conjunctiva/sclera: Conjunctivae normal.      Pupils: Pupils are equal, round, and reactive to light. Cardiovascular:      Rate and Rhythm: Normal rate and regular rhythm. Heart sounds: Normal heart sounds. No murmur heard. No friction rub. No gallop. Pulmonary:      Effort: Pulmonary effort is normal. No respiratory distress. Breath sounds: Normal breath sounds. No wheezing. Abdominal:      General: Bowel sounds are normal. There is no distension.       Palpations: Abdomen is soft. Tenderness: There is no abdominal tenderness. Musculoskeletal:         General: No tenderness. Normal range of motion. Cervical back: Normal range of motion and neck supple. Skin:     General: Skin is warm and dry. Neurological:      Mental Status: He is alert and oriented to person, place, and time. Cranial Nerves: Cranial nerves 2-12 are intact. Sensory: Sensation is intact. Motor: Weakness and tremor present. Coordination: Coordination abnormal. Finger-Nose-Finger Test abnormal.      Gait: Gait abnormal.      Deep Tendon Reflexes: Reflexes are normal and symmetric. Babinski sign absent on the right side. Reflex Scores:       Tricep reflexes are 2+ on the right side and 2+ on the left side. Bicep reflexes are 2+ on the right side and 2+ on the left side. Brachioradialis reflexes are 2+ on the right side and 2+ on the left side. Patellar reflexes are 2+ on the right side and 2+ on the left side. Achilles reflexes are 2+ on the right side and 2+ on the left side. Psychiatric:         Behavior: Behavior normal.         Thought Content: Thought content normal.         Judgment: Judgment normal.            An electronic signature was used to authenticate this note.     --Fariba Wu MD

## 2022-11-22 ENCOUNTER — TELEPHONE (OUTPATIENT)
Dept: FAMILY MEDICINE CLINIC | Age: 62
End: 2022-11-22

## 2022-11-22 DIAGNOSIS — R29.898 WEAKNESS OF BOTH LOWER EXTREMITIES: Primary | ICD-10-CM

## 2022-11-22 NOTE — TELEPHONE ENCOUNTER
Patient's caregiver returned call to the office and informed of message. She is going to call and confirm that the labs will be drawn the morning of the MRI.

## 2022-11-22 NOTE — TELEPHONE ENCOUNTER
Central scheduling is requesting for Hepatic panel to be ordered for pt to do before getting his MRI? Please order and fa to [phone]?

## 2022-11-30 ENCOUNTER — HOSPITAL ENCOUNTER (OUTPATIENT)
Age: 62
Discharge: HOME | End: 2022-11-30
Payer: MEDICAID

## 2022-11-30 ENCOUNTER — HOSPITAL ENCOUNTER (OUTPATIENT)
Dept: MRI IMAGING | Age: 62
Discharge: HOME | End: 2022-11-30
Payer: MEDICAID

## 2022-11-30 DIAGNOSIS — R25.1 TREMOR: ICD-10-CM

## 2022-11-30 DIAGNOSIS — R29.898 WEAKNESS OF BOTH LOWER EXTREMITIES: ICD-10-CM

## 2022-11-30 LAB
A/G RATIO: 1.8 (ref 1.1–2.2)
ALBUMIN SERPL-MCNC: 4.2 G/DL (ref 3.4–5)
ALP BLD-CCNC: 127 U/L (ref 40–129)
ALT SERPL-CCNC: 37 U/L (ref 10–40)
ANION GAP SERPL CALCULATED.3IONS-SCNC: 6 MMOL/L (ref 3–16)
AST SERPL-CCNC: 26 U/L (ref 15–37)
BILIRUB SERPL-MCNC: 0.3 MG/DL (ref 0–1)
BUN BLDV-MCNC: 11 MG/DL (ref 7–20)
CALCIUM SERPL-MCNC: 9.5 MG/DL (ref 8.3–10.6)
CHLORIDE BLD-SCNC: 99 MMOL/L (ref 99–110)
CO2: 29 MMOL/L (ref 21–32)
CREAT SERPL-MCNC: 0.7 MG/DL (ref 0.8–1.3)
GFR SERPL CREATININE-BSD FRML MDRD: >60 ML/MIN/{1.73_M2}
GLUCOSE BLD-MCNC: 146 MG/DL (ref 70–99)
POTASSIUM SERPL-SCNC: 4.5 MMOL/L (ref 3.5–5.1)
SODIUM BLD-SCNC: 134 MMOL/L (ref 136–145)
TOTAL PROTEIN: 6.5 G/DL (ref 6.4–8.2)

## 2022-11-30 PROCEDURE — 80053 COMPREHEN METABOLIC PANEL: CPT

## 2022-11-30 PROCEDURE — 36415 COLL VENOUS BLD VENIPUNCTURE: CPT

## 2022-12-07 ENCOUNTER — HOSPITAL ENCOUNTER (OUTPATIENT)
Dept: MRI IMAGING | Age: 62
Discharge: HOME | End: 2022-12-07
Payer: MEDICAID

## 2022-12-07 PROCEDURE — 70553 MRI BRAIN STEM W/O & W/DYE: CPT

## 2022-12-07 PROCEDURE — A9579 GAD-BASE MR CONTRAST NOS,1ML: HCPCS | Performed by: FAMILY MEDICINE

## 2022-12-07 PROCEDURE — 6360000004 HC RX CONTRAST MEDICATION: Performed by: FAMILY MEDICINE

## 2022-12-07 RX ADMIN — GADOTERIDOL 15 ML: 279.3 INJECTION, SOLUTION INTRAVENOUS at 10:41

## 2022-12-08 ENCOUNTER — TELEPHONE (OUTPATIENT)
Dept: FAMILY MEDICINE CLINIC | Age: 62
End: 2022-12-08

## 2023-01-12 DIAGNOSIS — G25.81 RLS (RESTLESS LEGS SYNDROME): ICD-10-CM

## 2023-01-12 DIAGNOSIS — G25.81 RESTLESS LEGS: ICD-10-CM

## 2023-01-12 RX ORDER — PRAMIPEXOLE DIHYDROCHLORIDE 0.25 MG/1
0.25 TABLET ORAL NIGHTLY
Qty: 30 TABLET | Refills: 3 | Status: SHIPPED | OUTPATIENT
Start: 2023-01-12

## 2023-01-12 RX ORDER — GABAPENTIN 300 MG/1
300 CAPSULE ORAL 3 TIMES DAILY
Qty: 270 CAPSULE | Refills: 2 | Status: SHIPPED | OUTPATIENT
Start: 2023-01-12 | End: 2023-04-12

## 2023-01-12 NOTE — TELEPHONE ENCOUNTER
Pt requesting refill    pramipexole (MIRAPEX) 0.25 MG tablet   gabapentin (NEURONTIN) 300 MG capsule     Last Office Visit  -  11/21/22  Next Office Visit  -  none

## 2023-01-16 DIAGNOSIS — G25.81 RLS (RESTLESS LEGS SYNDROME): ICD-10-CM

## 2023-01-16 RX ORDER — GABAPENTIN 300 MG/1
300 CAPSULE ORAL 3 TIMES DAILY
Qty: 270 CAPSULE | Refills: 2 | Status: SHIPPED | OUTPATIENT
Start: 2023-01-16 | End: 2023-04-16

## 2023-01-16 RX ORDER — GABAPENTIN 300 MG/1
300 CAPSULE ORAL 3 TIMES DAILY
Qty: 270 CAPSULE | Refills: 2 | Status: SHIPPED | OUTPATIENT
Start: 2023-01-16 | End: 2023-01-16 | Stop reason: SDUPTHER

## 2023-01-16 NOTE — TELEPHONE ENCOUNTER
Received call from [address] [place] [address] requesting direction to be changed to 1 pill in the am, 1 pill in the afternoon and 1 pill in the evening so they can be distributed to a pill pack .  Advise

## 2023-02-02 ENCOUNTER — TELEPHONE (OUTPATIENT)
Dept: FAMILY MEDICINE CLINIC | Age: 63
End: 2023-02-02

## 2023-02-02 NOTE — TELEPHONE ENCOUNTER
Patient states pramipexole (MIRAPEX) 0.25 MG tablet is not helping with his restless leg. He would like to know if it can be changed.  Advise

## 2023-02-07 RX ORDER — ROPINIROLE 0.25 MG/1
0.25 TABLET, FILM COATED ORAL NIGHTLY
Qty: 90 TABLET | Refills: 3 | Status: SHIPPED | OUTPATIENT
Start: 2023-02-07

## 2023-02-17 ENCOUNTER — TELEMEDICINE (OUTPATIENT)
Dept: FAMILY MEDICINE CLINIC | Age: 63
End: 2023-02-17
Payer: MEDICAID

## 2023-02-17 DIAGNOSIS — J40 BRONCHITIS: Primary | ICD-10-CM

## 2023-02-17 DIAGNOSIS — J30.2 SEASONAL ALLERGIES: ICD-10-CM

## 2023-02-17 DIAGNOSIS — J44.9 CHRONIC OBSTRUCTIVE PULMONARY DISEASE, UNSPECIFIED COPD TYPE (HCC): ICD-10-CM

## 2023-02-17 PROCEDURE — 3023F SPIROM DOC REV: CPT | Performed by: REGISTERED NURSE

## 2023-02-17 PROCEDURE — 3017F COLORECTAL CA SCREEN DOC REV: CPT | Performed by: REGISTERED NURSE

## 2023-02-17 PROCEDURE — G8420 CALC BMI NORM PARAMETERS: HCPCS | Performed by: REGISTERED NURSE

## 2023-02-17 PROCEDURE — 99214 OFFICE O/P EST MOD 30 MIN: CPT | Performed by: REGISTERED NURSE

## 2023-02-17 PROCEDURE — G8484 FLU IMMUNIZE NO ADMIN: HCPCS | Performed by: REGISTERED NURSE

## 2023-02-17 PROCEDURE — G8427 DOCREV CUR MEDS BY ELIG CLIN: HCPCS | Performed by: REGISTERED NURSE

## 2023-02-17 PROCEDURE — 4004F PT TOBACCO SCREEN RCVD TLK: CPT | Performed by: REGISTERED NURSE

## 2023-02-17 RX ORDER — METHYLPREDNISOLONE 4 MG/1
TABLET ORAL
Qty: 21 TABLET | Refills: 0 | Status: SHIPPED | OUTPATIENT
Start: 2023-02-17

## 2023-02-17 RX ORDER — FLUTICASONE PROPIONATE 50 MCG
2 SPRAY, SUSPENSION (ML) NASAL DAILY
Qty: 16 G | Refills: 5 | Status: SHIPPED | OUTPATIENT
Start: 2023-02-17

## 2023-02-17 RX ORDER — ALBUTEROL SULFATE 90 UG/1
2 AEROSOL, METERED RESPIRATORY (INHALATION) 4 TIMES DAILY PRN
Qty: 18 G | Refills: 5 | Status: SHIPPED | OUTPATIENT
Start: 2023-02-17

## 2023-02-17 RX ORDER — AZITHROMYCIN 250 MG/1
250 TABLET, FILM COATED ORAL SEE ADMIN INSTRUCTIONS
Qty: 6 TABLET | Refills: 0 | Status: SHIPPED | OUTPATIENT
Start: 2023-02-17 | End: 2023-02-22

## 2023-02-17 ASSESSMENT — ENCOUNTER SYMPTOMS
SINUS PRESSURE: 1
RHINORRHEA: 0
EYES NEGATIVE: 1
COUGH: 1
GASTROINTESTINAL NEGATIVE: 1
WHEEZING: 1
SORE THROAT: 0
SINUS PAIN: 1
SHORTNESS OF BREATH: 1

## 2023-02-17 NOTE — PROGRESS NOTES
2023    TELEHEALTH EVALUATION -- Audio/Visual (During [facility]-25 public health emergency)    HPI:    Manoj Layne (:  1960) has requested an audio/video evaluation for the following concern(s):    He is here for sinus pain and pressure and he feels like he has drainage \"running down into his chest\". He complains of a productive cough. No fever but he does feel tired and generally unwell. He has occasional wheezing. He has an albuterol inhaler but says he is in need of a refill. Review of Systems   Constitutional:  Positive for fatigue. Negative for chills, diaphoresis and fever. HENT:  Positive for congestion, postnasal drip, sinus pressure and sinus pain. Negative for rhinorrhea and sore throat. Eyes: Negative. Respiratory:  Positive for cough, shortness of breath and wheezing. Cardiovascular: Negative. Gastrointestinal: Negative. Genitourinary: Negative. Musculoskeletal: Negative. Neurological: Negative. Prior to Visit Medications    Medication Sig Taking? Authorizing Provider   albuterol sulfate HFA (VENTOLIN HFA) 108 (90 Base) MCG/ACT inhaler Inhale 2 puffs into the lungs 4 times daily as needed for Wheezing Yes Serena Thomas Speaker, APRN - CNP   fluticasone (FLONASE) 50 MCG/ACT nasal spray 2 sprays by Each Nostril route daily Yes Roshan Jackson APRN - CNP   azithromycin (ZITHROMAX) 250 MG tablet Take 1 tablet by mouth See Admin Instructions for 5 days 500mg on day 1 followed by 250mg on days 2 - 5 Yes Serena Kearns APRN - CNP   methylPREDNISolone (MEDROL, JENNIFER,) 4 MG tablet Take by mouth. Take 6 tabs on day 1, 5 tabs on day 2, 4 tabs on day 3, 3 tabs on day 4, 2 tabs on day 5, 1 tab on day 6 Yes Roshan Jackson APRN - CNP   rOPINIRole (REQUIP) 0.25 MG tablet Take 1 tablet by mouth nightly Yes Elias Leo MD   gabapentin (NEURONTIN) 300 MG capsule Take 1 capsule by mouth in the morning, at noon, and at bedtime for 90 days.  Intended supply: 30 days Yes Ari Cordoba MD   FLUoxetine (PROZAC) 20 MG capsule TAKE 1 CAPSULE BY MOUTH EVERY DAY Yes Ari Cordoba MD   colestipol (COLESTID) 1 g tablet Take 1 tablet by mouth 2 times daily Yes Ari Cordoba MD   montelukast (SINGULAIR) 10 MG tablet Take 1 tablet by mouth daily Yes Ari Cordoba MD   sildenafil (VIAGRA) 50 MG tablet Take 1 tablet by mouth daily as needed for Erectile Dysfunction Yes Ari Cordoba MD       Social History     Tobacco Use    Smoking status: Every Day     Packs/day: 1.00     Years: 46.00     Pack years: 46.00     Types: Cigarettes    Smokeless tobacco: Never   Vaping Use    Vaping Use: Never used   Substance Use Topics    Alcohol use: No    Drug use: No        No Known Allergies,   Past Medical History:   Diagnosis Date    A-fib ([facility]     Acid reflux     Cancer ([facility]     wife denies    Cholelithiasis 03/19/2019    Depression     DM (diabetes mellitus) ([facility]     no meds    History of left bundle branch block (LBBB)     HX OTHER MEDICAL     \"blood doesn't flow fast enough into heart\"    Wears dentures        PHYSICAL EXAMINATION:  [ INSTRUCTIONS:  \"[x]\" Indicates a positive item  \"[]\" Indicates a negative item  -- DELETE ALL ITEMS NOT EXAMINED]    Constitutional: [x] Appears well-developed and well-nourished [x] No apparent distress      [] Abnormal-   Mental status   [x]Alert and awake  [x] Oriented to person/place/time [x]Able to follow commands      Eyes:  EOM    [x]  Normal  [] Abnormal-  Sclera  [x]  Normal  [] Abnormal -         Discharge [x]  None visible  [] Abnormal -    HENT:   [x] Normocephalic, atraumatic.   [] Abnormal   [x] Mouth/Throat: Mucous membranes are moist.     External Ears [x] Normal  [] Abnormal-     Neck: [x] No visualized mass     Pulmonary/Chest: [x] Respiratory effort normal.  [x] No visualized signs of difficulty breathing or respiratory distress        [x] Abnormal- productive wheezy cough     Musculoskeletal:   [x] Normal gait with no signs of ataxia         [x] Normal range of motion of neck        [] Abnormal-       Neurological:        [x] No Facial Asymmetry (Cranial nerve 7 motor function) (limited exam to video visit)          [x] No gaze palsy        [] Abnormal-         Skin:        [x] No significant exanthematous lesions or discoloration noted on facial skin         [] Abnormal-            Psychiatric:       [x] Normal Affect [x] No Hallucinations        [] Abnormal-     Other pertinent observable physical exam findings- none    ASSESSMENT/PLAN:    1. Bronchitis  Symptoms are consistent with bronchitis. Given his history of COPD along with productive, wheezing cough we will start him on a Z-Jennifer and Medrol Dosepak. Mucinex DM and Flonase for symptom management. Rest and fluids. Monitor for worsening symptoms-go to ED for difficulty breathing.  - azithromycin (ZITHROMAX) 250 MG tablet; Take 1 tablet by mouth See Admin Instructions for 5 days 500mg on day 1 followed by 250mg on days 2 - 5  Dispense: 6 tablet; Refill: 0  - methylPREDNISolone (MEDROL, JENNIFER,) 4 MG tablet; Take by mouth. Take 6 tabs on day 1, 5 tabs on day 2, 4 tabs on day 3, 3 tabs on day 4, 2 tabs on day 5, 1 tab on day 6  Dispense: 21 tablet; Refill: 0    2. COPD   Still smokes cigarettes. Uses albuterol inhaler intermittently. - albuterol sulfate HFA (VENTOLIN HFA) 108 (90 Base) MCG/ACT inhaler; Inhale 2 puffs into the lungs 4 times daily as needed for Wheezing  Dispense: 18 g; Refill: 5    3. Seasonal allergies  Stable with singulair and flonase. Flonase refill requested. - fluticasone (FLONASE) 50 MCG/ACT nasal spray; 2 sprays by Each Nostril route daily  Dispense: 16 g; Refill: 5      Return if symptoms worsen or fail to improve. Anahi Olivarez is a 57 y.o. male being evaluated by a Virtual Visit (video visit) encounter to address concerns as mentioned above. A caregiver was present when appropriate.  Due to this being a TeleHealth encounter ([facility]), evaluation of the following organ systems was limited: Vitals/Constitutional/EENT/Resp/CV/GI//MS/Neuro/Skin/Heme-Lymph-Imm. Pursuant to the emergency declaration under the [address] [place] [address], [facility] authority and the [employer] [employer] Act, this Virtual Visit was conducted with patient's (and/or legal guardian's) consent, to reduce the patient's risk of exposure to COVID-19 and provide necessary medical care. The patient (and/or legal guardian) has also been advised to contact this office for worsening conditions or problems, and seek emergency medical treatment and/or call 911 if deemed necessary. Services were provided through a video synchronous discussion virtually to substitute for in-person clinic visit. Patient and provider were located at their individual homes. --TRACI Prajapati - CNP on 2/17/2023 at 3:26 PM    An electronic signature was used to authenticate this note. This chart was generated using the [address] [place] [address] ViSation system. I created this record but it may contain dictation errors due to the limitation of the software. This is a telehealth visit. Verbal consent to participate in video visit was obtained. Pursuant to the emergency declaration under the [address] [place] [address], [address] [place] [address] authority and the [employer] Act, this Virtual Visit was conducted, with patient's consent, to reduce the patient's risk of exposure to COVID-19 and provide continuity of care for an established/new patient. Services were provided through a video synchronous discussion virtually to substitute for in-person clinic visit.  I discussed with the patient the nature of our telehealth visits via interactive/real-time audio/video that:   - I would evaluate the patient and recommend diagnostics and treatments based on my assessment   - Our sessions are not being recorded and that personal health information is protected   - Our team would provide follow up care in person if/when the patient needs it.

## 2023-02-17 NOTE — PATIENT INSTRUCTIONS
Recommendations for symptom management:     1. Water: Drink 1/2 of body weight (lb) in ounces,  Up to 90 Ounces of water per day. This will loosen mucus in the head and chest & improve the weak feeling of dehydration, allow the body to get germ fighting resources to the infection. Half can be juice or sugar free powerade or garorate. Don't count drinks with caffeine or carbonation. Infants can have Pedialyte liquid or freezer pops. Avoid salt if you have high Blood Pressure, swelling in the feet or ankles or have heart problems. 2. Sleep: Get 8-10 hours a night and rest during the evening after work or school. If you have trouble sleeping, adults can take Melatonin 5mg up to 2 tabs at bedtime ( not for children or pregnant women). 3.Cough: Take cough medicines with Guaifenesin ( to loosen chest or head congestion) and Dextromethorphan ( to decrease excess cough). Robitussin D.M. Syrup every 4-6 hrs or Mucinex D. M. pills twice a day. Use the pediatric formulations for children over 6 months making sure they are alcohol & sugar free for children, pregnant women, and diabetics. 4. Pain And Fevers: Take Acetaminophen ( Tylenol) for fevers, aches, and headaches. 2-500 mg every 8 hours for adults. Appropriate doses at bedtime for children may help them sleep better. Ibuprofen may be used if not pregnant, but should be given with food to avoid nausea. Avoid Ibuprofen if you have high blood pressure, CHF, or kidney problems. 5.Gargle: (DAY ONE OF SYMPTOMS) Gargle in the back of the throat with the head tilted back and to the sides with a strong mouthwash  ( Listerine or Scope) after meals and at bedtime at least 4 -5 times a day. This helps kill bacteria and viruses in the back of the throat and will shorten the duration and decrease the severity of your symptoms: sore throat, cough, ear popping,/ear pain, and possibly dizziness. 6. Smoking: Avoid smoking or exposure to second hand smoke.     7. Zinc: (DAY ONE OF SYMPTOMS)  Zinc lozenges such as Cold Michael (available most stores), or Basic ([employer] brand) will help shorten the duration and lessen symptoms such as sore throat, cough, nasal congestion, runny nose, and post nasal drip. Use 1 lozenge every 2-4 hours ( after meals if stomach is sensitive). Children can use 10-15 mg or less 3-4 times a day or Zinc lollypops. In pregnancy limit to 50-60 mg a day for 7 days as prenatals have Zinc also. With diarrhea use zinc pills 50 mg 1/2 to 1 pill 2x/day. 8. Vitamins: Vitamin C 500 mg with breakfast and dinner. Children and pregnant women should drink citrus juices. This speeds healing and strengthens immune system    9. Chest Symptoms: Vicks Vapor rub to the chest at bedtime. 10. Decongestants: Avoid all decongestants if you have high blood pressure. Safe to take if you do not have high blood pressure. 11.Try all of the above starting with day 1 of symptoms. Adults with fevers over 103 degrees or shortness of breath should call the office immediately. 12. Nasal saline spray or rinse. There are many different ways to do this OTC.

## 2023-02-24 DIAGNOSIS — F32.A DEPRESSION, UNSPECIFIED DEPRESSION TYPE: ICD-10-CM

## 2023-02-24 DIAGNOSIS — K90.89 BILE SALT-INDUCED DIARRHEA: ICD-10-CM

## 2023-02-24 RX ORDER — FLUOXETINE HYDROCHLORIDE 20 MG/1
CAPSULE ORAL
Qty: 90 CAPSULE | Refills: 1 | Status: SHIPPED | OUTPATIENT
Start: 2023-02-24

## 2023-02-24 RX ORDER — MONTELUKAST SODIUM 4 MG/1
TABLET, CHEWABLE ORAL
Qty: 180 TABLET | Refills: 3 | Status: SHIPPED | OUTPATIENT
Start: 2023-02-24

## 2023-03-08 ENCOUNTER — OFFICE VISIT (OUTPATIENT)
Dept: FAMILY MEDICINE CLINIC | Age: 63
End: 2023-03-08
Payer: MEDICAID

## 2023-03-08 VITALS
HEART RATE: 78 BPM | SYSTOLIC BLOOD PRESSURE: 94 MMHG | OXYGEN SATURATION: 96 % | HEIGHT: 70 IN | WEIGHT: 160 LBS | BODY MASS INDEX: 22.9 KG/M2 | DIASTOLIC BLOOD PRESSURE: 56 MMHG

## 2023-03-08 DIAGNOSIS — I48.91 ATRIAL FIBRILLATION, UNSPECIFIED TYPE (HCC): ICD-10-CM

## 2023-03-08 DIAGNOSIS — G25.81 RLS (RESTLESS LEGS SYNDROME): Primary | ICD-10-CM

## 2023-03-08 DIAGNOSIS — E11.9 TYPE 2 DIABETES MELLITUS WITHOUT COMPLICATION, WITHOUT LONG-TERM CURRENT USE OF INSULIN (HCC): ICD-10-CM

## 2023-03-08 PROCEDURE — 3017F COLORECTAL CA SCREEN DOC REV: CPT | Performed by: FAMILY MEDICINE

## 2023-03-08 PROCEDURE — G8420 CALC BMI NORM PARAMETERS: HCPCS | Performed by: FAMILY MEDICINE

## 2023-03-08 PROCEDURE — G8427 DOCREV CUR MEDS BY ELIG CLIN: HCPCS | Performed by: FAMILY MEDICINE

## 2023-03-08 PROCEDURE — 4004F PT TOBACCO SCREEN RCVD TLK: CPT | Performed by: FAMILY MEDICINE

## 2023-03-08 PROCEDURE — 3046F HEMOGLOBIN A1C LEVEL >9.0%: CPT | Performed by: FAMILY MEDICINE

## 2023-03-08 PROCEDURE — 2022F DILAT RTA XM EVC RTNOPTHY: CPT | Performed by: FAMILY MEDICINE

## 2023-03-08 PROCEDURE — 99214 OFFICE O/P EST MOD 30 MIN: CPT | Performed by: FAMILY MEDICINE

## 2023-03-08 PROCEDURE — G8484 FLU IMMUNIZE NO ADMIN: HCPCS | Performed by: FAMILY MEDICINE

## 2023-03-08 RX ORDER — ROPINIROLE 0.25 MG/1
TABLET, FILM COATED ORAL
Qty: 180 TABLET | Refills: 0 | Status: SHIPPED | OUTPATIENT
Start: 2023-03-08 | End: 2023-03-17

## 2023-03-08 ASSESSMENT — PATIENT HEALTH QUESTIONNAIRE - PHQ9
SUM OF ALL RESPONSES TO PHQ QUESTIONS 1-9: 0
2. FEELING DOWN, DEPRESSED OR HOPELESS: 0
SUM OF ALL RESPONSES TO PHQ QUESTIONS 1-9: 0
SUM OF ALL RESPONSES TO PHQ QUESTIONS 1-9: 0
SUM OF ALL RESPONSES TO PHQ9 QUESTIONS 1 & 2: 0
SUM OF ALL RESPONSES TO PHQ QUESTIONS 1-9: 0
1. LITTLE INTEREST OR PLEASURE IN DOING THINGS: 0

## 2023-03-10 ENCOUNTER — TELEPHONE (OUTPATIENT)
Dept: FAMILY MEDICINE CLINIC | Age: 63
End: 2023-03-10

## 2023-03-10 NOTE — TELEPHONE ENCOUNTER
Pt called in stating he is wanting to let the office know that he is doing well on the Ropinirole.  Stated he will call in if he develops any issues

## 2023-03-10 NOTE — TELEPHONE ENCOUNTER
Received script from [facility]. Script for Requip was  already sent to [facility] on 3/8/23. Called and left a message to verify correct pharmacy.

## 2023-03-17 RX ORDER — ROPINIROLE 1 MG/1
1 TABLET, FILM COATED ORAL 3 TIMES DAILY
Qty: 90 TABLET | Refills: 3 | Status: SHIPPED | OUTPATIENT
Start: 2023-03-17

## 2023-03-19 NOTE — PROGRESS NOTES
Tamar Echeverria (:  1960) is a 57 y.o. male,Established patient, here for evaluation of the following chief complaint(s):  Medication Check (Wants B12 shot )         ASSESSMENT/PLAN:  1. RLS (restless legs syndrome)  Patient placed on a titrating dose of ropinirole. 2. Atrial fibrillation, unspecified type (HCC)  Currently sinus. 3. Type 2 diabetes mellitus without complication, without long-term current use of insulin ([facility])  Hemoglobin A1C   Date Value Ref Range Status   2022 6.1 % Final     This has been well controlled. No follow-ups on file. Subjective   SUBJECTIVE/OBJECTIVE:  Tamar Echeverria is a 57 y.o. male. Patient presents with:  Medication Check: Wants B12 shot       78-year-old male who has been having increasing issues with restless leg mounjaro. Patient notes that he is having trouble sleeping at night. His legs are always in pain. Patient notes that he also has type 2 diabetes mellitus but this has been well controlled recently. Patient denies any significant issues. Patient has a history of atrial fibrillation and this has been well controlled otherwise. The patients PMH, surgical history, family history, medications, allergies were all reviewed and updated as appropriate today. Review of Systems       Objective   Physical Exam  Vitals and nursing note reviewed. Constitutional:       Appearance: Normal appearance. He is well-developed. HENT:      Head: Normocephalic and atraumatic. Right Ear: External ear normal.      Left Ear: External ear normal.      Nose: Nose normal.   Eyes:      Conjunctiva/sclera: Conjunctivae normal.      Pupils: Pupils are equal, round, and reactive to light. Cardiovascular:      Rate and Rhythm: Normal rate and regular rhythm. Heart sounds: Normal heart sounds. No murmur heard. No friction rub. No gallop. Pulmonary:      Effort: Pulmonary effort is normal. No respiratory distress.       Breath sounds: Normal breath sounds. No wheezing. Abdominal:      General: Bowel sounds are normal. There is no distension. Palpations: Abdomen is soft. Tenderness: There is no abdominal tenderness. Musculoskeletal:         General: No tenderness. Normal range of motion. Cervical back: Normal range of motion and neck supple. Skin:     General: Skin is warm and dry. Neurological:      Mental Status: He is alert and oriented to person, place, and time. Deep Tendon Reflexes: Reflexes are normal and symmetric. Psychiatric:         Behavior: Behavior normal.         Thought Content: Thought content normal.         Judgment: Judgment normal.            An electronic signature was used to authenticate this note.     --Nadeen Pereira MD

## 2023-04-05 RX ORDER — ROPINIROLE 0.25 MG/1
TABLET, FILM COATED ORAL
Qty: 180 TABLET | Refills: 0 | Status: SHIPPED | OUTPATIENT
Start: 2023-04-05

## 2023-04-05 NOTE — TELEPHONE ENCOUNTER
Last Office Visit  -  03/08/2023  Next Office Visit  -  04/13/2023    Last Filled  -    Last UDS -    Contract -

## 2023-05-01 ENCOUNTER — OFFICE VISIT (OUTPATIENT)
Dept: FAMILY MEDICINE CLINIC | Age: 63
End: 2023-05-01

## 2023-05-01 VITALS
BODY MASS INDEX: 23.05 KG/M2 | HEIGHT: 70 IN | WEIGHT: 161 LBS | OXYGEN SATURATION: 98 % | HEART RATE: 81 BPM | SYSTOLIC BLOOD PRESSURE: 102 MMHG | DIASTOLIC BLOOD PRESSURE: 60 MMHG

## 2023-05-01 DIAGNOSIS — E53.8 VITAMIN B 12 DEFICIENCY: Primary | ICD-10-CM

## 2023-05-01 DIAGNOSIS — G25.81 RLS (RESTLESS LEGS SYNDROME): ICD-10-CM

## 2023-05-01 DIAGNOSIS — R53.83 OTHER FATIGUE: ICD-10-CM

## 2023-05-01 LAB
ALBUMIN SERPL-MCNC: 4.4 G/DL (ref 3.4–5)
ALBUMIN/GLOB SERPL: 2.3 {RATIO} (ref 1.1–2.2)
ALP SERPL-CCNC: 127 U/L (ref 40–129)
ALT SERPL-CCNC: 47 U/L (ref 10–40)
ANION GAP SERPL CALCULATED.3IONS-SCNC: 11 MMOL/L (ref 3–16)
AST SERPL-CCNC: 29 U/L (ref 15–37)
BASOPHILS # BLD: 0.1 K/UL (ref 0–0.2)
BASOPHILS NFR BLD: 0.9 %
BILIRUB SERPL-MCNC: 0.3 MG/DL (ref 0–1)
BUN SERPL-MCNC: 10 MG/DL (ref 7–20)
CALCIUM SERPL-MCNC: 9.2 MG/DL (ref 8.3–10.6)
CHLORIDE SERPL-SCNC: 101 MMOL/L (ref 99–110)
CO2 SERPL-SCNC: 23 MMOL/L (ref 21–32)
CREAT SERPL-MCNC: 0.8 MG/DL (ref 0.8–1.3)
DEPRECATED RDW RBC AUTO: 13.5 % (ref 12.4–15.4)
EOSINOPHIL # BLD: 0.2 K/UL (ref 0–0.6)
EOSINOPHIL NFR BLD: 2.6 %
GFR SERPLBLD CREATININE-BSD FMLA CKD-EPI: >60 ML/MIN/{1.73_M2}
GLUCOSE SERPL-MCNC: 211 MG/DL (ref 70–99)
HCT VFR BLD AUTO: 42 % (ref 40.5–52.5)
HGB BLD-MCNC: 14.4 G/DL (ref 13.5–17.5)
LYMPHOCYTES # BLD: 2.7 K/UL (ref 1–5.1)
LYMPHOCYTES NFR BLD: 30.3 %
MCH RBC QN AUTO: 31.1 PG (ref 26–34)
MCHC RBC AUTO-ENTMCNC: 34.2 G/DL (ref 31–36)
MCV RBC AUTO: 90.9 FL (ref 80–100)
MONOCYTES # BLD: 0.4 K/UL (ref 0–1.3)
MONOCYTES NFR BLD: 4.4 %
NEUTROPHILS # BLD: 5.5 K/UL (ref 1.7–7.7)
NEUTROPHILS NFR BLD: 61.8 %
PLATELET # BLD AUTO: 239 K/UL (ref 135–450)
PMV BLD AUTO: 7.1 FL (ref 5–10.5)
POTASSIUM SERPL-SCNC: 4.1 MMOL/L (ref 3.5–5.1)
PROT SERPL-MCNC: 6.3 G/DL (ref 6.4–8.2)
RBC # BLD AUTO: 4.62 M/UL (ref 4.2–5.9)
SODIUM SERPL-SCNC: 135 MMOL/L (ref 136–145)
TSH SERPL DL<=0.005 MIU/L-ACNC: 2.05 UIU/ML (ref 0.27–4.2)
WBC # BLD AUTO: 8.8 K/UL (ref 4–11)

## 2023-05-01 RX ORDER — GABAPENTIN 300 MG/1
300 CAPSULE ORAL 3 TIMES DAILY
Qty: 270 CAPSULE | Refills: 2 | Status: SHIPPED | OUTPATIENT
Start: 2023-05-01 | End: 2023-07-30

## 2023-05-01 RX ORDER — CYANOCOBALAMIN 1000 UG/ML
1000 INJECTION, SOLUTION INTRAMUSCULAR; SUBCUTANEOUS ONCE
Status: COMPLETED | OUTPATIENT
Start: 2023-05-01 | End: 2023-05-01

## 2023-05-01 RX ORDER — M-VIT,TX,IRON,MINS/CALC/FOLIC 27MG-0.4MG
1 TABLET ORAL DAILY
COMMUNITY

## 2023-05-01 RX ADMIN — CYANOCOBALAMIN 1000 MCG: 1000 INJECTION, SOLUTION INTRAMUSCULAR; SUBCUTANEOUS at 09:07

## 2023-05-02 ENCOUNTER — TELEPHONE (OUTPATIENT)
Dept: FAMILY MEDICINE CLINIC | Age: 63
End: 2023-05-02

## 2023-05-02 RX ORDER — ROPINIROLE 1 MG/1
1 TABLET, FILM COATED ORAL 3 TIMES DAILY
Qty: 90 TABLET | Refills: 3 | Status: SHIPPED | OUTPATIENT
Start: 2023-05-02

## 2023-05-13 NOTE — PROGRESS NOTES
Wilfredo Feldman (:  1960) is a 57 y.o. male,Established patient, here for evaluation of the following chief complaint(s):  1 Month Follow-Up         ASSESSMENT/PLAN:  1. Vitamin B 12 deficiency  -     cyanocobalamin injection 1,000 mcg; 1,000 mcg, IntraMUSCular, ONCE, 1 dose, On 23 at 0915  2. RLS (restless legs syndrome)  -     gabapentin (NEURONTIN) 300 MG capsule; Take 1 capsule by mouth in the morning, at noon, and at bedtime for 90 days. Intended supply: 30 days, Disp-270 capsule, R-2Normal  3. Other fatigue  -     CBC with Auto Differential; Future  -     Comprehensive Metabolic Panel; Future  -     TSH with Reflex; Future      No follow-ups on file. Subjective   SUBJECTIVE/OBJECTIVE:  Wilfredo Feldman is a 57 y.o. male. Patient presents with:  1 Month Follow-Up      This 64-year-old male who has had issues with restless leg syndrome. This is been worsening over time. He is also feeling more fatigued. Patient notes that this is worsening over time. Patient has had a history of B12 deficiency in the past.  Patient notes that he has feeling just more tired and run down all the time. He feels like he has no get up and go. Denies depression. Has had no significant stress or anxiety. Just feels overall that he is running out of gas. The patients PMH, surgical history, family history, medications, allergies were all reviewed and updated as appropriate today. Review of Systems       Objective   Physical Exam  Vitals and nursing note reviewed. Constitutional:       Appearance: Normal appearance. He is well-developed. HENT:      Head: Normocephalic and atraumatic. Right Ear: External ear normal.      Left Ear: External ear normal.      Nose: Nose normal.   Eyes:      Conjunctiva/sclera: Conjunctivae normal.      Pupils: Pupils are equal, round, and reactive to light. Cardiovascular:      Rate and Rhythm: Normal rate and regular rhythm.       Heart sounds: Normal heart

## 2023-05-23 DIAGNOSIS — G25.81 RLS (RESTLESS LEGS SYNDROME): ICD-10-CM

## 2023-05-23 RX ORDER — GABAPENTIN 300 MG/1
300 CAPSULE ORAL 3 TIMES DAILY
Qty: 270 CAPSULE | Refills: 2 | Status: SHIPPED | OUTPATIENT
Start: 2023-05-23 | End: 2023-05-24 | Stop reason: SDUPTHER

## 2023-05-23 NOTE — TELEPHONE ENCOUNTER
Pt requesting refill  gabapentin (NEURONTIN) 300 MG capsule   Last Office Visit  -  5/1/23  Next Office Visit  -  none

## 2023-05-24 ENCOUNTER — TELEPHONE (OUTPATIENT)
Dept: FAMILY MEDICINE CLINIC | Age: 63
End: 2023-05-24

## 2023-05-24 DIAGNOSIS — G25.81 RLS (RESTLESS LEGS SYNDROME): ICD-10-CM

## 2023-05-24 RX ORDER — GABAPENTIN 300 MG/1
600 CAPSULE ORAL 3 TIMES DAILY
Qty: 540 CAPSULE | Refills: 0 | Status: SHIPPED | OUTPATIENT
Start: 2023-05-24 | End: 2023-08-22

## 2023-06-12 ENCOUNTER — TELEPHONE (OUTPATIENT)
Dept: FAMILY MEDICINE CLINIC | Age: 63
End: 2023-06-12

## 2023-06-27 ENCOUNTER — HOSPITAL ENCOUNTER (EMERGENCY)
Age: 63
Discharge: HOME | End: 2023-06-27
Payer: MEDICAID

## 2023-06-27 ENCOUNTER — OFFICE VISIT (OUTPATIENT)
Dept: FAMILY MEDICINE CLINIC | Age: 63
End: 2023-06-27
Payer: MEDICAID

## 2023-06-27 ENCOUNTER — APPOINTMENT (OUTPATIENT)
Dept: GENERAL RADIOLOGY | Age: 63
End: 2023-06-27
Payer: MEDICAID

## 2023-06-27 VITALS
HEIGHT: 68 IN | SYSTOLIC BLOOD PRESSURE: 100 MMHG | HEART RATE: 86 BPM | TEMPERATURE: 97.8 F | RESPIRATION RATE: 17 BRPM | WEIGHT: 160 LBS | OXYGEN SATURATION: 98 % | BODY MASS INDEX: 24.25 KG/M2 | DIASTOLIC BLOOD PRESSURE: 74 MMHG

## 2023-06-27 VITALS
DIASTOLIC BLOOD PRESSURE: 60 MMHG | WEIGHT: 160.4 LBS | HEART RATE: 89 BPM | HEIGHT: 68 IN | SYSTOLIC BLOOD PRESSURE: 112 MMHG | OXYGEN SATURATION: 96 % | BODY MASS INDEX: 24.31 KG/M2

## 2023-06-27 DIAGNOSIS — R53.1 GENERALIZED WEAKNESS: ICD-10-CM

## 2023-06-27 DIAGNOSIS — R53.81 MALAISE AND FATIGUE: Primary | ICD-10-CM

## 2023-06-27 DIAGNOSIS — M79.642 PAIN OF LEFT HAND: Primary | ICD-10-CM

## 2023-06-27 DIAGNOSIS — N52.9 ERECTILE DYSFUNCTION, UNSPECIFIED ERECTILE DYSFUNCTION TYPE: ICD-10-CM

## 2023-06-27 DIAGNOSIS — R53.83 MALAISE AND FATIGUE: Primary | ICD-10-CM

## 2023-06-27 LAB
ABO + RH BLD: NORMAL
ALBUMIN SERPL-MCNC: 4.3 G/DL (ref 3.4–5)
ALBUMIN/GLOB SERPL: 1.8 {RATIO} (ref 1.1–2.2)
ALP SERPL-CCNC: 137 U/L (ref 40–129)
ALT SERPL-CCNC: 47 U/L (ref 10–40)
ANION GAP SERPL CALCULATED.3IONS-SCNC: 9 MMOL/L (ref 3–16)
AST SERPL-CCNC: 31 U/L (ref 15–37)
BASOPHILS # BLD: 0.1 K/UL (ref 0–0.2)
BASOPHILS NFR BLD: 1.2 %
BILIRUB SERPL-MCNC: <0.2 MG/DL (ref 0–1)
BILIRUB UR QL STRIP.AUTO: NEGATIVE
BLD GP AB SCN SERPL QL: NORMAL
BUN SERPL-MCNC: 10 MG/DL (ref 7–20)
CALCIUM SERPL-MCNC: 9.2 MG/DL (ref 8.3–10.6)
CHLORIDE SERPL-SCNC: 102 MMOL/L (ref 99–110)
CLARITY UR: CLEAR
CO2 SERPL-SCNC: 25 MMOL/L (ref 21–32)
COLOR UR: YELLOW
CREAT SERPL-MCNC: 0.7 MG/DL (ref 0.8–1.3)
DEPRECATED RDW RBC AUTO: 13.6 % (ref 12.4–15.4)
EKG ATRIAL RATE: 68 BPM
EKG DIAGNOSIS: NORMAL
EKG P AXIS: 68 DEGREES
EKG P-R INTERVAL: 182 MS
EKG Q-T INTERVAL: 434 MS
EKG QRS DURATION: 146 MS
EKG QTC CALCULATION (BAZETT): 461 MS
EKG R AXIS: -18 DEGREES
EKG T AXIS: 93 DEGREES
EKG VENTRICULAR RATE: 68 BPM
EOSINOPHIL # BLD: 0.2 K/UL (ref 0–0.6)
EOSINOPHIL NFR BLD: 3.3 %
GFR SERPLBLD CREATININE-BSD FMLA CKD-EPI: >60 ML/MIN/{1.73_M2}
GLUCOSE SERPL-MCNC: 212 MG/DL (ref 70–99)
GLUCOSE UR STRIP.AUTO-MCNC: 500 MG/DL
HCT VFR BLD AUTO: 41.7 % (ref 40.5–52.5)
HGB BLD-MCNC: 14.3 G/DL (ref 13.5–17.5)
HGB UR QL STRIP.AUTO: NEGATIVE
KETONES UR STRIP.AUTO-MCNC: NEGATIVE MG/DL
LEUKOCYTE ESTERASE UR QL STRIP.AUTO: NEGATIVE
LYMPHOCYTES # BLD: 3 K/UL (ref 1–5.1)
LYMPHOCYTES NFR BLD: 39.9 %
MCH RBC QN AUTO: 30.9 PG (ref 26–34)
MCHC RBC AUTO-ENTMCNC: 34.3 G/DL (ref 31–36)
MCV RBC AUTO: 89.9 FL (ref 80–100)
MONOCYTES # BLD: 0.3 K/UL (ref 0–1.3)
MONOCYTES NFR BLD: 4.4 %
NEUTROPHILS # BLD: 3.8 K/UL (ref 1.7–7.7)
NEUTROPHILS NFR BLD: 51.2 %
NITRITE UR QL STRIP.AUTO: NEGATIVE
PH UR STRIP.AUTO: 6 [PH] (ref 5–8)
PLATELET # BLD AUTO: 234 K/UL (ref 135–450)
PMV BLD AUTO: 6.6 FL (ref 5–10.5)
POTASSIUM SERPL-SCNC: 4.3 MMOL/L (ref 3.5–5.1)
PROT SERPL-MCNC: 6.7 G/DL (ref 6.4–8.2)
PROT UR STRIP.AUTO-MCNC: NEGATIVE MG/DL
RBC # BLD AUTO: 4.63 M/UL (ref 4.2–5.9)
SODIUM SERPL-SCNC: 136 MMOL/L (ref 136–145)
SP GR UR STRIP.AUTO: 1.01 (ref 1–1.03)
TROPONIN, HIGH SENSITIVITY: 9 NG/L (ref 0–22)
TROPONIN, HIGH SENSITIVITY: <6 NG/L (ref 0–22)
UA COMPLETE W REFLEX CULTURE PNL UR: ABNORMAL
UA DIPSTICK W REFLEX MICRO PNL UR: ABNORMAL
URN SPEC COLLECT METH UR: ABNORMAL
UROBILINOGEN UR STRIP-ACNC: 0.2 E.U./DL
WBC # BLD AUTO: 7.4 K/UL (ref 4–11)

## 2023-06-27 PROCEDURE — G8420 CALC BMI NORM PARAMETERS: HCPCS | Performed by: STUDENT IN AN ORGANIZED HEALTH CARE EDUCATION/TRAINING PROGRAM

## 2023-06-27 PROCEDURE — 80053 COMPREHEN METABOLIC PANEL: CPT

## 2023-06-27 PROCEDURE — 86901 BLOOD TYPING SEROLOGIC RH(D): CPT

## 2023-06-27 PROCEDURE — 3017F COLORECTAL CA SCREEN DOC REV: CPT | Performed by: STUDENT IN AN ORGANIZED HEALTH CARE EDUCATION/TRAINING PROGRAM

## 2023-06-27 PROCEDURE — 85025 COMPLETE CBC W/AUTO DIFF WBC: CPT

## 2023-06-27 PROCEDURE — 86900 BLOOD TYPING SEROLOGIC ABO: CPT

## 2023-06-27 PROCEDURE — 71045 X-RAY EXAM CHEST 1 VIEW: CPT

## 2023-06-27 PROCEDURE — 84484 ASSAY OF TROPONIN QUANT: CPT

## 2023-06-27 PROCEDURE — 4004F PT TOBACCO SCREEN RCVD TLK: CPT | Performed by: STUDENT IN AN ORGANIZED HEALTH CARE EDUCATION/TRAINING PROGRAM

## 2023-06-27 PROCEDURE — 99285 EMERGENCY DEPT VISIT HI MDM: CPT

## 2023-06-27 PROCEDURE — 81003 URINALYSIS AUTO W/O SCOPE: CPT

## 2023-06-27 PROCEDURE — G8427 DOCREV CUR MEDS BY ELIG CLIN: HCPCS | Performed by: STUDENT IN AN ORGANIZED HEALTH CARE EDUCATION/TRAINING PROGRAM

## 2023-06-27 PROCEDURE — 86850 RBC ANTIBODY SCREEN: CPT

## 2023-06-27 PROCEDURE — 99214 OFFICE O/P EST MOD 30 MIN: CPT | Performed by: STUDENT IN AN ORGANIZED HEALTH CARE EDUCATION/TRAINING PROGRAM

## 2023-06-27 PROCEDURE — 93010 ELECTROCARDIOGRAM REPORT: CPT | Performed by: INTERNAL MEDICINE

## 2023-06-27 PROCEDURE — 93005 ELECTROCARDIOGRAM TRACING: CPT | Performed by: EMERGENCY MEDICINE

## 2023-06-27 RX ORDER — SILDENAFIL 50 MG/1
50 TABLET, FILM COATED ORAL DAILY PRN
Qty: 20 TABLET | Refills: 0 | Status: SHIPPED | OUTPATIENT
Start: 2023-06-27

## 2023-06-27 ASSESSMENT — PAIN - FUNCTIONAL ASSESSMENT: PAIN_FUNCTIONAL_ASSESSMENT: NONE - DENIES PAIN

## 2023-08-31 DIAGNOSIS — F32.A DEPRESSION, UNSPECIFIED DEPRESSION TYPE: ICD-10-CM

## 2023-08-31 RX ORDER — FLUOXETINE HYDROCHLORIDE 20 MG/1
CAPSULE ORAL
Qty: 90 CAPSULE | Refills: 1 | Status: SHIPPED | OUTPATIENT
Start: 2023-08-31

## 2023-08-31 NOTE — TELEPHONE ENCOUNTER
[address] requesting refill  FLUoxetine (PROZAC) 20 MG capsule   Last Office Visit  -  6/27/23  Next Office Visit  -  none

## 2023-09-11 DIAGNOSIS — G25.81 RLS (RESTLESS LEGS SYNDROME): ICD-10-CM

## 2023-09-11 RX ORDER — GABAPENTIN 300 MG/1
600 CAPSULE ORAL 3 TIMES DAILY
Qty: 540 CAPSULE | Refills: 0 | Status: SHIPPED | OUTPATIENT
Start: 2023-09-11 | End: 2023-12-10

## 2023-09-11 RX ORDER — ROPINIROLE 0.25 MG/1
TABLET, FILM COATED ORAL
Qty: 270 TABLET | Refills: 1 | Status: SHIPPED | OUTPATIENT
Start: 2023-09-11

## 2023-09-11 NOTE — TELEPHONE ENCOUNTER
Pt requesting refill    rOPINIRole (REQUIP) 0.25 MG tablet       gabapentin (NEURONTIN) 300 MG capsule       [facility] [place]

## 2023-09-23 NOTE — ED NOTES
Pt reports his mother has a history of MS and he is concerned that is what is causing his symptoms.       Maurice Hinton RN  05/31/20 2004 without difficulty

## 2023-10-20 DIAGNOSIS — F32.A DEPRESSION, UNSPECIFIED DEPRESSION TYPE: ICD-10-CM

## 2023-10-20 RX ORDER — FLUOXETINE HYDROCHLORIDE 20 MG/1
CAPSULE ORAL
Qty: 90 CAPSULE | Refills: 1 | Status: SHIPPED | OUTPATIENT
Start: 2023-10-20

## 2023-10-20 NOTE — TELEPHONE ENCOUNTER
Last Office Visit  -  5/1/2023    Next Office Visit  -  No future appointments. Patient states he has been out of medication for about one month.

## 2023-10-26 RX ORDER — ROPINIROLE 1 MG/1
1 TABLET, FILM COATED ORAL 3 TIMES DAILY
Qty: 90 TABLET | Refills: 2 | Status: SHIPPED | OUTPATIENT
Start: 2023-10-26

## 2023-10-30 ENCOUNTER — TELEPHONE (OUTPATIENT)
Dept: FAMILY MEDICINE CLINIC | Age: 63
End: 2023-10-30

## 2023-10-30 DIAGNOSIS — F32.A DEPRESSION, UNSPECIFIED DEPRESSION TYPE: ICD-10-CM

## 2023-10-30 RX ORDER — ROPINIROLE 0.25 MG/1
TABLET, FILM COATED ORAL
Qty: 270 TABLET | Refills: 1 | Status: SHIPPED | OUTPATIENT
Start: 2023-10-30

## 2023-10-30 NOTE — TELEPHONE ENCOUNTER
Patient's spouse called in requesting rx below     FLUoxetine (PROZAC) 20 MG capsule      Please advise

## 2023-10-30 NOTE — TELEPHONE ENCOUNTER
Pharmacy req refill for patient on  Ropinrole 0.25mg tabs  Last visit 5/1/23  No future  [facility]      Please advise in PCP absence.

## 2023-11-04 DIAGNOSIS — G25.81 RLS (RESTLESS LEGS SYNDROME): ICD-10-CM

## 2023-11-05 RX ORDER — GABAPENTIN 300 MG/1
CAPSULE ORAL
Qty: 270 CAPSULE | Refills: 1 | Status: SHIPPED | OUTPATIENT
Start: 2023-11-05 | End: 2023-12-05

## 2023-12-28 ENCOUNTER — OFFICE VISIT (OUTPATIENT)
Dept: FAMILY MEDICINE CLINIC | Age: 63
End: 2023-12-28
Payer: MEDICARE

## 2023-12-28 VITALS
SYSTOLIC BLOOD PRESSURE: 108 MMHG | DIASTOLIC BLOOD PRESSURE: 64 MMHG | BODY MASS INDEX: 23.7 KG/M2 | WEIGHT: 156.4 LBS | HEART RATE: 68 BPM | OXYGEN SATURATION: 97 % | HEIGHT: 68 IN

## 2023-12-28 DIAGNOSIS — N52.9 ERECTILE DYSFUNCTION, UNSPECIFIED ERECTILE DYSFUNCTION TYPE: ICD-10-CM

## 2023-12-28 DIAGNOSIS — J30.9 ALLERGIC RHINITIS, UNSPECIFIED SEASONALITY, UNSPECIFIED TRIGGER: ICD-10-CM

## 2023-12-28 DIAGNOSIS — Z87.891 PERSONAL HISTORY OF TOBACCO USE: ICD-10-CM

## 2023-12-28 DIAGNOSIS — Z00.00 INITIAL MEDICARE ANNUAL WELLNESS VISIT: Primary | ICD-10-CM

## 2023-12-28 DIAGNOSIS — F32.A DEPRESSION, UNSPECIFIED DEPRESSION TYPE: ICD-10-CM

## 2023-12-28 DIAGNOSIS — J30.2 SEASONAL ALLERGIES: ICD-10-CM

## 2023-12-28 DIAGNOSIS — J44.9 CHRONIC OBSTRUCTIVE PULMONARY DISEASE, UNSPECIFIED COPD TYPE (HCC): ICD-10-CM

## 2023-12-28 DIAGNOSIS — G25.81 RLS (RESTLESS LEGS SYNDROME): ICD-10-CM

## 2023-12-28 DIAGNOSIS — Z12.5 ENCOUNTER FOR SCREENING FOR MALIGNANT NEOPLASM OF PROSTATE: ICD-10-CM

## 2023-12-28 DIAGNOSIS — K90.89 BILE SALT-INDUCED DIARRHEA: ICD-10-CM

## 2023-12-28 PROCEDURE — G0438 PPPS, INITIAL VISIT: HCPCS | Performed by: STUDENT IN AN ORGANIZED HEALTH CARE EDUCATION/TRAINING PROGRAM

## 2023-12-28 PROCEDURE — 1123F ACP DISCUSS/DSCN MKR DOCD: CPT | Performed by: STUDENT IN AN ORGANIZED HEALTH CARE EDUCATION/TRAINING PROGRAM

## 2023-12-28 PROCEDURE — G0296 VISIT TO DETERM LDCT ELIG: HCPCS | Performed by: STUDENT IN AN ORGANIZED HEALTH CARE EDUCATION/TRAINING PROGRAM

## 2023-12-28 RX ORDER — FLUOXETINE HYDROCHLORIDE 20 MG/1
CAPSULE ORAL
Qty: 90 CAPSULE | Refills: 1 | Status: SHIPPED | OUTPATIENT
Start: 2023-12-28

## 2023-12-28 RX ORDER — ROPINIROLE 0.25 MG/1
TABLET, FILM COATED ORAL
Qty: 270 TABLET | Refills: 1 | Status: SHIPPED | OUTPATIENT
Start: 2023-12-28

## 2023-12-28 RX ORDER — MONTELUKAST SODIUM 10 MG/1
10 TABLET ORAL DAILY
Qty: 90 TABLET | Refills: 1 | Status: SHIPPED | OUTPATIENT
Start: 2023-12-28

## 2023-12-28 RX ORDER — FLUTICASONE PROPIONATE 50 MCG
2 SPRAY, SUSPENSION (ML) NASAL DAILY
Qty: 16 G | Refills: 5 | Status: SHIPPED | OUTPATIENT
Start: 2023-12-28

## 2023-12-28 RX ORDER — SILDENAFIL 50 MG/1
50 TABLET, FILM COATED ORAL DAILY PRN
Qty: 20 TABLET | Refills: 0 | Status: SHIPPED | OUTPATIENT
Start: 2023-12-28

## 2023-12-28 RX ORDER — MONTELUKAST SODIUM 4 MG/1
1 TABLET, CHEWABLE ORAL 2 TIMES DAILY
Qty: 180 TABLET | Refills: 1 | Status: SHIPPED | OUTPATIENT
Start: 2023-12-28

## 2023-12-28 RX ORDER — GABAPENTIN 300 MG/1
300 CAPSULE ORAL 3 TIMES DAILY
Qty: 270 CAPSULE | Refills: 1 | Status: SHIPPED | OUTPATIENT
Start: 2023-12-28 | End: 2024-06-25

## 2023-12-28 RX ORDER — ALBUTEROL SULFATE 90 UG/1
2 AEROSOL, METERED RESPIRATORY (INHALATION) 4 TIMES DAILY PRN
Qty: 18 G | Refills: 5 | Status: SHIPPED | OUTPATIENT
Start: 2023-12-28

## 2023-12-28 NOTE — PATIENT INSTRUCTIONS
and older who are or were heavy smokers. That means people with a smoking history of at least 20 pack years. A pack year is a way to measure how heavy a smoker you are or were. To figure out your pack years, multiply how many packs a day on average (assuming 20 cigarettes per pack) you have smoked by how many years you have smoked. For example: If you smoked 1 pack a day for 20 years, that's 1 times 20. So you have a smoking history of 20 pack years. If you smoked 2 packs a day for 10 years, that's 2 times 10. So you have a smoking history of 20 pack years. Experts agree that screening is for people who have a high risk of lung cancer. But experts don't agree on what high risk means. Some say people age 48 or older with at least a 20-pack-year smoking history are high risk. Others say it's people age 54 or older with a 30-pack-year history. To see if you could benefit from screening, first find out if you are at high risk for lung cancer. Your doctor can help you decide your lung cancer risk. What are the risks of screening? CT screening for lung cancer isn't perfect. It can show an abnormal result when it turns out there wasn't any cancer. This is called a false-positive result. This means you may need more tests to make sure you don't have cancer. These tests can be harmful and cause a lot of worry. These tests may include more CT scans and invasive testing like a lung biopsy. In a biopsy, the doctor takes a sample of tissue from inside your lung so it can be looked at under a microscope. A biopsy is the only way to tell if you have lung cancer. If the biopsy finds cancer, you and your doctor will have to decide how or whether to treat it. Some lung cancers found on CT scans are harmless and would not have caused a problem if they had not been found through screening. But because doctors can't tell which ones will turn out to be harmless, most will be treated.  This means that you may get

## 2023-12-28 NOTE — PROGRESS NOTES
Medicare Annual Wellness Visit    Damari Azar is here for Medicare AWV    Assessment & Plan   Initial Medicare annual wellness visit  -     CBC; Future  -     Comprehensive Metabolic Panel; Future  -     Hemoglobin A1C; Future  -     Lipid, Fasting; Future  -     PSA Screening; Future  -     Vitamin B12; Future  Personal history of tobacco use  -     NE VISIT TO DISCUSS LUNG CA SCREEN W LDCT  -     CT Lung Screen (Initial/Annual/Baseline); Future  Encounter for screening for malignant neoplasm of prostate  -     PSA Screening; Future  Chronic obstructive pulmonary disease, unspecified COPD type (HCC)  -     albuterol sulfate HFA (VENTOLIN HFA) 108 (90 Base) MCG/ACT inhaler; Inhale 2 puffs into the lungs 4 times daily as needed for Wheezing, Disp-18 g, R-5Normal  Bile salt-induced diarrhea  -     colestipol (COLESTID) 1 g tablet; Take 1 tablet by mouth 2 times daily, Disp-180 tablet, R-1Normal  Depression, unspecified depression type  -     FLUoxetine (PROZAC) 20 MG capsule; Take 1 capsule by mouth daily. , Disp-90 capsule, R-1Normal  Seasonal allergies  -     fluticasone (FLONASE) 50 MCG/ACT nasal spray; 2 sprays by Each Nostril route daily, Disp-16 g, R-5Normal  RLS (restless legs syndrome)  -     gabapentin (NEURONTIN) 300 MG capsule; Take 1 capsule by mouth 3 times daily for 180 days. , Disp-270 capsule, R-1Normal  -     rOPINIRole (REQUIP) 0.25 MG tablet; TAKE 3 TAB 3 TIMES DAILY. , Disp-270 tablet, R-1Normal  Allergic rhinitis, unspecified seasonality, unspecified trigger  -     montelukast (SINGULAIR) 10 MG tablet; Take 1 tablet by mouth daily, Disp-90 tablet, R-1Normal  Erectile dysfunction, unspecified erectile dysfunction type  -     sildenafil (VIAGRA) 50 MG tablet;  Take 1 tablet by mouth daily as needed for Erectile Dysfunction, Disp-20 tablet, R-0BIN: [id number]; PCN: [id number] [facility],[address]; Group: [id number]; Member ID: [id number]    Recommendations for Preventive Services Due: see orders and patient

## 2023-12-29 DIAGNOSIS — Z12.5 ENCOUNTER FOR SCREENING FOR MALIGNANT NEOPLASM OF PROSTATE: ICD-10-CM

## 2023-12-29 DIAGNOSIS — Z00.00 INITIAL MEDICARE ANNUAL WELLNESS VISIT: ICD-10-CM

## 2023-12-29 LAB
ALBUMIN SERPL-MCNC: 4.5 G/DL (ref 3.4–5)
ALBUMIN/GLOB SERPL: 2.1 {RATIO} (ref 1.1–2.2)
ALP SERPL-CCNC: 131 U/L (ref 40–129)
ALT SERPL-CCNC: 30 U/L (ref 10–40)
ANION GAP SERPL CALCULATED.3IONS-SCNC: 10 MMOL/L (ref 3–16)
AST SERPL-CCNC: 22 U/L (ref 15–37)
BILIRUB SERPL-MCNC: 0.3 MG/DL (ref 0–1)
BUN SERPL-MCNC: 12 MG/DL (ref 7–20)
CALCIUM SERPL-MCNC: 9.2 MG/DL (ref 8.3–10.6)
CHLORIDE SERPL-SCNC: 100 MMOL/L (ref 99–110)
CHOLEST SERPL-MCNC: 144 MG/DL (ref 0–199)
CO2 SERPL-SCNC: 28 MMOL/L (ref 21–32)
CREAT SERPL-MCNC: 0.7 MG/DL (ref 0.8–1.3)
DEPRECATED RDW RBC AUTO: 13.5 % (ref 12.4–15.4)
GFR SERPLBLD CREATININE-BSD FMLA CKD-EPI: >60 ML/MIN/{1.73_M2}
GLUCOSE SERPL-MCNC: 150 MG/DL (ref 70–99)
HCT VFR BLD AUTO: 42.7 % (ref 40.5–52.5)
HDLC SERPL-MCNC: 30 MG/DL (ref 40–60)
HGB BLD-MCNC: 14.6 G/DL (ref 13.5–17.5)
LDL CHOLESTEROL CALCULATED: 87 MG/DL
MCH RBC QN AUTO: 30.5 PG (ref 26–34)
MCHC RBC AUTO-ENTMCNC: 34.3 G/DL (ref 31–36)
MCV RBC AUTO: 89 FL (ref 80–100)
PLATELET # BLD AUTO: 251 K/UL (ref 135–450)
PMV BLD AUTO: 6.9 FL (ref 5–10.5)
POTASSIUM SERPL-SCNC: 4.8 MMOL/L (ref 3.5–5.1)
PROT SERPL-MCNC: 6.6 G/DL (ref 6.4–8.2)
PSA SERPL DL<=0.01 NG/ML-MCNC: 0.85 NG/ML (ref 0–4)
RBC # BLD AUTO: 4.8 M/UL (ref 4.2–5.9)
SODIUM SERPL-SCNC: 138 MMOL/L (ref 136–145)
TRIGL SERPL-MCNC: 135 MG/DL (ref 0–150)
VIT B12 SERPL-MCNC: 250 PG/ML (ref 211–911)
VLDLC SERPL CALC-MCNC: 27 MG/DL
WBC # BLD AUTO: 7.3 K/UL (ref 4–11)

## 2023-12-30 LAB
EST. AVERAGE GLUCOSE BLD GHB EST-MCNC: 139.9 MG/DL
HBA1C MFR BLD: 6.5 %

## 2024-01-10 ENCOUNTER — HOSPITAL ENCOUNTER (OUTPATIENT)
Dept: CT IMAGING | Age: 64
Discharge: HOME | End: 2024-01-10
Attending: STUDENT IN AN ORGANIZED HEALTH CARE EDUCATION/TRAINING PROGRAM
Payer: MEDICARE

## 2024-01-10 DIAGNOSIS — Z87.891 PERSONAL HISTORY OF TOBACCO USE: ICD-10-CM

## 2024-01-10 PROCEDURE — 71271 CT THORAX LUNG CANCER SCR C-: CPT

## 2024-01-15 ENCOUNTER — OFFICE VISIT (OUTPATIENT)
Dept: FAMILY MEDICINE CLINIC | Age: 64
End: 2024-01-15
Payer: MEDICARE

## 2024-01-15 VITALS
SYSTOLIC BLOOD PRESSURE: 118 MMHG | HEIGHT: 68 IN | OXYGEN SATURATION: 98 % | BODY MASS INDEX: 23.19 KG/M2 | DIASTOLIC BLOOD PRESSURE: 58 MMHG | HEART RATE: 80 BPM | WEIGHT: 153 LBS

## 2024-01-15 DIAGNOSIS — E11.9 TYPE 2 DIABETES MELLITUS WITHOUT COMPLICATION, WITHOUT LONG-TERM CURRENT USE OF INSULIN (HCC): ICD-10-CM

## 2024-01-15 DIAGNOSIS — E53.8 B12 DEFICIENCY: Primary | ICD-10-CM

## 2024-01-15 DIAGNOSIS — I48.91 ATRIAL FIBRILLATION, UNSPECIFIED TYPE (HCC): ICD-10-CM

## 2024-01-15 DIAGNOSIS — J44.9 CHRONIC OBSTRUCTIVE PULMONARY DISEASE, UNSPECIFIED COPD TYPE (HCC): ICD-10-CM

## 2024-01-15 PROCEDURE — 99214 OFFICE O/P EST MOD 30 MIN: CPT | Performed by: FAMILY MEDICINE

## 2024-01-15 PROCEDURE — 96372 THER/PROPH/DIAG INJ SC/IM: CPT | Performed by: FAMILY MEDICINE

## 2024-01-15 PROCEDURE — 1123F ACP DISCUSS/DSCN MKR DOCD: CPT | Performed by: FAMILY MEDICINE

## 2024-01-15 RX ORDER — LANCETS 30 GAUGE
1 EACH MISCELLANEOUS 2 TIMES DAILY
Qty: 300 EACH | Refills: 1 | Status: SHIPPED | OUTPATIENT
Start: 2024-01-15

## 2024-01-15 RX ORDER — BLOOD-GLUCOSE METER
1 KIT MISCELLANEOUS DAILY
Qty: 1 KIT | Refills: 0 | Status: SHIPPED | OUTPATIENT
Start: 2024-01-15

## 2024-01-15 RX ORDER — BLOOD SUGAR DIAGNOSTIC
1 STRIP MISCELLANEOUS 2 TIMES DAILY
Qty: 100 EACH | Refills: 1 | Status: SHIPPED | OUTPATIENT
Start: 2024-01-15

## 2024-01-15 RX ORDER — GLUCOSAMINE HCL/CHONDROITIN SU 500-400 MG
CAPSULE ORAL
Qty: 100 STRIP | Refills: 0 | Status: SHIPPED | OUTPATIENT
Start: 2024-01-15

## 2024-01-15 RX ORDER — CYANOCOBALAMIN 1000 UG/ML
1000 INJECTION, SOLUTION INTRAMUSCULAR; SUBCUTANEOUS ONCE
Status: COMPLETED | OUTPATIENT
Start: 2024-01-15 | End: 2024-01-15

## 2024-01-15 RX ADMIN — CYANOCOBALAMIN 1000 MCG: 1000 INJECTION, SOLUTION INTRAMUSCULAR; SUBCUTANEOUS at 12:04

## 2024-01-15 ASSESSMENT — PATIENT HEALTH QUESTIONNAIRE - PHQ9
1. LITTLE INTEREST OR PLEASURE IN DOING THINGS: 0
8. MOVING OR SPEAKING SO SLOWLY THAT OTHER PEOPLE COULD HAVE NOTICED. OR THE OPPOSITE, BEING SO FIGETY OR RESTLESS THAT YOU HAVE BEEN MOVING AROUND A LOT MORE THAN USUAL: 1
SUM OF ALL RESPONSES TO PHQ QUESTIONS 1-9: 3
4. FEELING TIRED OR HAVING LITTLE ENERGY: 1
SUM OF ALL RESPONSES TO PHQ9 QUESTIONS 1 & 2: 0
SUM OF ALL RESPONSES TO PHQ QUESTIONS 1-9: 3
10. IF YOU CHECKED OFF ANY PROBLEMS, HOW DIFFICULT HAVE THESE PROBLEMS MADE IT FOR YOU TO DO YOUR WORK, TAKE CARE OF THINGS AT HOME, OR GET ALONG WITH OTHER PEOPLE: 0
3. TROUBLE FALLING OR STAYING ASLEEP: 1
2. FEELING DOWN, DEPRESSED OR HOPELESS: 0
5. POOR APPETITE OR OVEREATING: 0
SUM OF ALL RESPONSES TO PHQ QUESTIONS 1-9: 3
9. THOUGHTS THAT YOU WOULD BE BETTER OFF DEAD, OR OF HURTING YOURSELF: 0
SUM OF ALL RESPONSES TO PHQ QUESTIONS 1-9: 3
7. TROUBLE CONCENTRATING ON THINGS, SUCH AS READING THE NEWSPAPER OR WATCHING TELEVISION: 0
6. FEELING BAD ABOUT YOURSELF - OR THAT YOU ARE A FAILURE OR HAVE LET YOURSELF OR YOUR FAMILY DOWN: 0

## 2024-01-16 ENCOUNTER — TELEPHONE (OUTPATIENT)
Dept: FAMILY MEDICINE CLINIC | Age: 64
End: 2024-01-16

## 2024-01-16 NOTE — TELEPHONE ENCOUNTER
[facility] is calling to confirm rx for gabapentin. She states that he just picked up a script for 2-300 mg tabs three times daily, rx refill was from back in may.   The last two script in the chart from nov and dec states 1-300mg tab three times a day. He keeps going back and [facility]       Called pt he is unsure what he is to be on either he thought it may be 2 three times a day. I asked him which pharmacy would he like to [facility] using [facility]     At this time he is taking 2- 3oo mg tabs 3 times day, because that is the script he has received from [facility]

## 2024-01-18 DIAGNOSIS — K90.89 BILE SALT-INDUCED DIARRHEA: ICD-10-CM

## 2024-01-18 RX ORDER — MONTELUKAST SODIUM 4 MG/1
1 TABLET, CHEWABLE ORAL 2 TIMES DAILY
Qty: 180 TABLET | Refills: 1 | Status: SHIPPED | OUTPATIENT
Start: 2024-01-18

## 2024-01-18 NOTE — TELEPHONE ENCOUNTER
Last Office Visit  -  1-  Next Office Visit  -  4-    Last Filled  -    Last UDS -    Contract         Refill colestipol 1 gm      Pharmacy pillbox

## 2024-01-23 ENCOUNTER — TELEPHONE (OUTPATIENT)
Dept: FAMILY MEDICINE CLINIC | Age: 64
End: 2024-01-23

## 2024-01-23 ENCOUNTER — HOSPITAL ENCOUNTER (EMERGENCY)
Age: 64
Discharge: HOME | End: 2024-01-23
Payer: MEDICARE

## 2024-01-23 VITALS
SYSTOLIC BLOOD PRESSURE: 126 MMHG | RESPIRATION RATE: 16 BRPM | TEMPERATURE: 98.4 F | HEART RATE: 81 BPM | HEIGHT: 69 IN | DIASTOLIC BLOOD PRESSURE: 86 MMHG | WEIGHT: 153 LBS | BODY MASS INDEX: 22.66 KG/M2 | OXYGEN SATURATION: 98 %

## 2024-01-23 DIAGNOSIS — G25.81 RLS (RESTLESS LEGS SYNDROME): ICD-10-CM

## 2024-01-23 DIAGNOSIS — Z51.89 ENCOUNTER FOR MEDICATION ADJUSTMENT: Primary | ICD-10-CM

## 2024-01-23 PROCEDURE — 99282 EMERGENCY DEPT VISIT SF MDM: CPT

## 2024-01-23 RX ORDER — ROPINIROLE 0.25 MG/1
TABLET, FILM COATED ORAL
Qty: 270 TABLET | Refills: 1 | Status: SHIPPED | OUTPATIENT
Start: 2024-01-23

## 2024-01-23 ASSESSMENT — LIFESTYLE VARIABLES
HOW MANY STANDARD DRINKS CONTAINING ALCOHOL DO YOU HAVE ON A TYPICAL DAY: PATIENT DOES NOT DRINK
HOW OFTEN DO YOU HAVE A DRINK CONTAINING ALCOHOL: NEVER

## 2024-01-23 ASSESSMENT — PAIN - FUNCTIONAL ASSESSMENT: PAIN_FUNCTIONAL_ASSESSMENT: NONE - DENIES PAIN

## 2024-01-23 NOTE — ED PROVIDER NOTES
[facility] ED  EMERGENCY DEPARTMENT ENCOUNTER        Pt Name: Michael Luther  MRN: [id number]  Birthdate 1960  Date of evaluation: 1/23/2024  Provider: TRACI Lara - MARTÍN  PCP: Francis, Nando MOSER MD    This patient was not seen and evaluated by the attending physician No att. providers found.    I have evaluated this patient. My supervising physician was available for consultation.      CHIEF COMPLAINT       Chief Complaint   Patient presents with    Depression     States takes Prozac for this but feels his medication needs to be increased.  Denies SI/HI.  Denies any previous suicide attempts.  States didn't call provider who prescribes his meds for him.         HISTORY OF PRESENT ILLNESS   (Location/Symptom, Timing/Onset, Context/Setting, Quality, Duration, Modifying Factors, Severity)  Note limiting factors.     History from : Patient  Michael Luther is a 63 y.o. male who presents via private car for  depression medication adjustment. Onset was chronic. Duration has been since the onset. Context includes patient presents to the emergency department today stating that he currently takes Prozac for depression.  He states that he feels like he needs to increase his dose because he feels more depressed over the last couple of weeks.  He states he is having difficulty in his current housing situation and is trying to find another housing situation and states that he has been arguing with his wife more because he has felt more depressed.  Patient denies thoughts of wanting to harm himself or others.  He does state that he had an appointment with his PCP approximately a week ago for his annual visit but states that he forgot to ask them about the medication at that visit.  He is worried and has not called them because he thinks that it would take too long to get in for an appointment.  He thought if he came to the emergency department today we could \"just increase\" his medication dosage.

## 2024-01-23 NOTE — TELEPHONE ENCOUNTER
[facility] is requesting a 90 day rx for , #810    rOPINIRole (REQUIP) 0.25 MG tablet     OV 1/15/24  Next office visit   4/15/2024

## 2024-01-23 NOTE — TELEPHONE ENCOUNTER
Patient called stating he went [facility] for [facility], states ER dr suggested he contact PCP for increased dosage of prozac.     No in office appointments available this week. Pt does not have mychart, or video access thru cell phone. Please advise where to schedule.    [facility]

## 2024-01-23 NOTE — DISCHARGE INSTR - COC
Continuity of Care Form    Patient Name: Michael Luther   :  1960  MRN:  [id number]    Admit date:  2024  Discharge date:  ***    Code Status Order: Prior   Advance Directives:     Admitting Physician:  No admitting provider for patient encounter.  PCP: Nando Blanco MD    Discharging Nurse: ***  Discharging Hospital Unit/Room#: B2/B2  Discharging Unit Phone Number: ***    Emergency Contact:   Extended Emergency Contact Information  Primary Emergency Contact: Susie Luther  Address: [address] [place] [address] [address]           [place] [address] [facility]  Home Phone: [phone]  Mobile Phone: [phone]  Relation: Lay Caregiver  Secondary Emergency Contact: AshkanKimberly   [facility]  Home Phone: [phone]  Mobile Phone: [phone]  Relation: Child    Past Surgical History:  Past Surgical History:   Procedure Laterality Date    APPENDECTOMY      CHOLECYSTECTOMY, LAPAROSCOPIC N/A 3/19/2019    LAPAROSCOPIC CHOLECYSTECTOMY WITH INTRAOPERATIVE, CHOLANGIOGRAM performed by Monroe Odell MD at [facility] OR    COLONOSCOPY         Immunization History:   Immunization History   Administered Date(s) Administered    COVID-19, PFIZER PURPLE top, DILUTE for use, (age 12 y+), 30mcg/0.3mL 2021, 2021       Active Problems:  Patient Active Problem List   Diagnosis Code    Smoker F17.200    Abnormal stress test R94.39    Chest pain R07.9    SOB (shortness of breath) R06.02    Diabetes (HCC) E11.9    Symptomatic cholelithiasis K80.20    Pancytopenia (HCC) D61.818    Atrial fibrillation, unspecified type (HCC) I48.91    COPD exacerbation (HCC) J44.1       Isolation/Infection:   Isolation            No Isolation          Patient Infection Status       None to display            Nurse Assessment:  Last Vital Signs: /86   Pulse 81   Temp 98.4 °F (36.9 °C) (Oral)   Resp 16   Ht 1.753 m (5' 9\")   Wt 69.4 kg (153 lb)   SpO2 98%   BMI 22.59 kg/m²     Last

## 2024-01-24 DIAGNOSIS — N52.9 ERECTILE DYSFUNCTION, UNSPECIFIED ERECTILE DYSFUNCTION TYPE: ICD-10-CM

## 2024-01-24 RX ORDER — ROPINIROLE 1 MG/1
1 TABLET, FILM COATED ORAL 3 TIMES DAILY
Qty: 90 TABLET | Refills: 1 | Status: SHIPPED | OUTPATIENT
Start: 2024-01-24

## 2024-01-24 RX ORDER — SILDENAFIL 50 MG/1
50 TABLET, FILM COATED ORAL DAILY PRN
Qty: 20 TABLET | Refills: 0 | Status: SHIPPED | OUTPATIENT
Start: 2024-01-24

## 2024-01-24 NOTE — TELEPHONE ENCOUNTER
Last Office Visit  -  01/15/2024  Next Office Visit  -  04/15/2024    Last Filled  -    Last UDS -    Contract -

## 2024-01-24 NOTE — TELEPHONE ENCOUNTER
Last Office Visit  -  1/15/24  Next Office Visit  -  1/30/24    Last Filled  -  12/28/23  Last UDS -    Contract -

## 2024-01-26 NOTE — PROGRESS NOTES
Michael Luther (:  1960) is a 63 y.o. male,Established patient, here for evaluation of the following chief complaint(s):  Diabetes         ASSESSMENT/PLAN:  1. B12 deficiency  -     cyanocobalamin injection 1,000 mcg; 1,000 mcg, IntraMUSCular, ONCE, 1 dose, On Mon 1/15/24 at 1215  2. Type 2 diabetes mellitus without complication, without long-term current use of insulin (HCC)  -     blood glucose monitor strips; Test 2 times a day & as needed for symptoms of irregular blood glucose. Dispense sufficient amount for indicated testing frequency plus additional to accommodate PRN testing needs., Disp-100 strip, R-0, Normal  -     glucose monitoring kit; DAILY Starting Mon 1/15/2024, Disp-1 kit, R-0, Normal  -     Lancets MISC; 2 TIMES DAILY Starting Mon 1/15/2024, Disp-300 each, R-1, Normal    Hemoglobin A1C   Date Value Ref Range Status   2023 6.5 See comment % Final     Comment:     Comment:  Diagnosis of Diabetes: > or = 6.5%  Increased risk of diabetes (Prediabetes): 5.7-6.4%  Glycemic Control: Nonpregnant Adults: <7.0%                    Pregnant: <6.0%         Discuss home glucose monitoring today and glucose monitro prescribed.  3. Atrial fibrillation, unspecified type (HCC)  Stable, continue current meds.  4. Chronic obstructive pulmonary disease, unspecified COPD type (HCC)  Stable continue current meds.    No follow-ups on file.         Subjective   SUBJECTIVE/OBJECTIVE:  Michael Luther is a 63 y.o. male. Patient presents with:  Diabetes      This is a 64 yo male who presents for f/u of dm.  He somehow states that he was unaware that he had diabetes, but we have been treating him as such for multiple years.  Notes no issues currently.  No chest issues with excessive thirst or hunger. Notes no other issues currently.    Does have a hx of COPD and Afib.  He notes that he has been doing well otherwise.  Notes no other complaints currently.    The patients PMH, surgical history, family history,

## 2024-01-30 ENCOUNTER — OFFICE VISIT (OUTPATIENT)
Dept: FAMILY MEDICINE CLINIC | Age: 64
End: 2024-01-30
Payer: MEDICARE

## 2024-01-30 VITALS
SYSTOLIC BLOOD PRESSURE: 114 MMHG | HEIGHT: 69 IN | OXYGEN SATURATION: 98 % | HEART RATE: 74 BPM | BODY MASS INDEX: 22.66 KG/M2 | DIASTOLIC BLOOD PRESSURE: 58 MMHG | WEIGHT: 153 LBS

## 2024-01-30 DIAGNOSIS — Z09 HOSPITAL DISCHARGE FOLLOW-UP: Primary | ICD-10-CM

## 2024-01-30 DIAGNOSIS — F32.A DEPRESSION, UNSPECIFIED DEPRESSION TYPE: ICD-10-CM

## 2024-01-30 PROCEDURE — 1111F DSCHRG MED/CURRENT MED MERGE: CPT | Performed by: FAMILY MEDICINE

## 2024-01-30 PROCEDURE — 99214 OFFICE O/P EST MOD 30 MIN: CPT | Performed by: FAMILY MEDICINE

## 2024-01-30 PROCEDURE — 1123F ACP DISCUSS/DSCN MKR DOCD: CPT | Performed by: FAMILY MEDICINE

## 2024-01-30 RX ORDER — FLUOXETINE HYDROCHLORIDE 40 MG/1
CAPSULE ORAL
Qty: 90 CAPSULE | Refills: 3 | Status: SHIPPED | OUTPATIENT
Start: 2024-01-30

## 2024-02-23 DIAGNOSIS — E11.9 TYPE 2 DIABETES MELLITUS WITHOUT COMPLICATION, WITHOUT LONG-TERM CURRENT USE OF INSULIN (HCC): ICD-10-CM

## 2024-02-23 NOTE — TELEPHONE ENCOUNTER
Last Office Visit  -  1/30/24  Next Office Visit  -  2/29/24    Last Filled  -    Last UDS -    Contract -

## 2024-02-25 RX ORDER — BLOOD SUGAR DIAGNOSTIC
STRIP MISCELLANEOUS
Qty: 100 STRIP | Refills: 0 | Status: SHIPPED | OUTPATIENT
Start: 2024-02-25

## 2024-02-29 ENCOUNTER — OFFICE VISIT (OUTPATIENT)
Dept: FAMILY MEDICINE CLINIC | Age: 64
End: 2024-02-29
Payer: MEDICARE

## 2024-02-29 VITALS
OXYGEN SATURATION: 97 % | WEIGHT: 151 LBS | DIASTOLIC BLOOD PRESSURE: 46 MMHG | HEIGHT: 69 IN | HEART RATE: 66 BPM | SYSTOLIC BLOOD PRESSURE: 122 MMHG | BODY MASS INDEX: 22.36 KG/M2

## 2024-02-29 DIAGNOSIS — E53.8 B12 DEFICIENCY: ICD-10-CM

## 2024-02-29 DIAGNOSIS — F32.A DEPRESSION, UNSPECIFIED DEPRESSION TYPE: ICD-10-CM

## 2024-02-29 DIAGNOSIS — E11.9 TYPE 2 DIABETES MELLITUS WITHOUT COMPLICATION, WITHOUT LONG-TERM CURRENT USE OF INSULIN (HCC): Primary | ICD-10-CM

## 2024-02-29 PROCEDURE — 99214 OFFICE O/P EST MOD 30 MIN: CPT | Performed by: FAMILY MEDICINE

## 2024-02-29 PROCEDURE — 1123F ACP DISCUSS/DSCN MKR DOCD: CPT | Performed by: FAMILY MEDICINE

## 2024-02-29 PROCEDURE — 96372 THER/PROPH/DIAG INJ SC/IM: CPT | Performed by: FAMILY MEDICINE

## 2024-02-29 RX ORDER — CYANOCOBALAMIN 1000 UG/ML
1000 INJECTION, SOLUTION INTRAMUSCULAR; SUBCUTANEOUS ONCE
Status: COMPLETED | OUTPATIENT
Start: 2024-02-29 | End: 2024-02-29

## 2024-02-29 RX ADMIN — CYANOCOBALAMIN 1000 MCG: 1000 INJECTION, SOLUTION INTRAMUSCULAR; SUBCUTANEOUS at 12:52

## 2024-02-29 NOTE — PROGRESS NOTES
Michael Luther (:  1960) is a 63 y.o. male,Established patient, here for evaluation of the following chief complaint(s):  1 Month Follow-Up (Needs emotional support animal letter )         ASSESSMENT/PLAN:   1. Type 2 diabetes mellitus without complication, without long-term current use of insulin (HCC)  Hemoglobin A1C   Date Value Ref Range Status   2023 6.5 See comment % Final     Comment:     Comment:  Diagnosis of Diabetes: > or = 6.5%  Increased risk of diabetes (Prediabetes): 5.7-6.4%  Glycemic Control: Nonpregnant Adults: <7.0%                    Pregnant: <6.0%           2. Depression, unspecified depression type  Letter written for the patient today.  3. B12 deficiency  -     cyanocobalamin injection 1,000 mcg; 1,000 mcg, IntraMUSCular, ONCE, 1 dose, On Thu 24 at 1315      No follow-ups on file.         Subjective   SUBJECTIVE/OBJECTIVE:  Michael Luther is a 63 y.o. male. Patient presents with:  1 Month Follow-Up: Needs emotional support animal letter       This 63-year-old male presents for follow-up of diabetes and depression.  Patient also has B12 deficiency.  Patient notes that he has been feeling very tired recently.  He has been noticing that he is feeling very depressed.  Has a dog but needs to move his house and would like to have a letter for an emotional support animal.  Patient notes that this is has provided him a lot of comfort.  The patients PMH, surgical history, family history, medications, allergies were all reviewed and updated as appropriate today.          Review of Systems       Objective   Physical Exam  Vitals and nursing note reviewed.   Constitutional:       Appearance: Normal appearance. He is well-developed.   HENT:      Head: Normocephalic and atraumatic.      Right Ear: External ear normal.      Left Ear: External ear normal.      Nose: Nose normal.   Eyes:      Conjunctiva/sclera: Conjunctivae normal.      Pupils: Pupils are equal, round, and reactive to

## 2024-03-25 RX ORDER — ROPINIROLE 1 MG/1
1 TABLET, FILM COATED ORAL 3 TIMES DAILY
Qty: 90 TABLET | Refills: 0 | Status: SHIPPED | OUTPATIENT
Start: 2024-03-25

## 2024-04-09 DIAGNOSIS — G25.81 RLS (RESTLESS LEGS SYNDROME): ICD-10-CM

## 2024-04-09 RX ORDER — GABAPENTIN 300 MG/1
300 CAPSULE ORAL 3 TIMES DAILY
Qty: 270 CAPSULE | Refills: 1 | Status: SHIPPED | OUTPATIENT
Start: 2024-04-09 | End: 2024-10-06

## 2024-04-09 NOTE — TELEPHONE ENCOUNTER
Last Office Visit  -  2/29/24  Next Office Visit  -  4/15/24    Last Filled  -    Last UDS -    Contract -

## 2024-04-15 ENCOUNTER — OFFICE VISIT (OUTPATIENT)
Dept: FAMILY MEDICINE CLINIC | Age: 64
End: 2024-04-15

## 2024-04-15 VITALS
DIASTOLIC BLOOD PRESSURE: 70 MMHG | OXYGEN SATURATION: 95 % | WEIGHT: 152 LBS | HEART RATE: 78 BPM | BODY MASS INDEX: 22.51 KG/M2 | SYSTOLIC BLOOD PRESSURE: 104 MMHG | HEIGHT: 69 IN

## 2024-04-15 DIAGNOSIS — E11.9 TYPE 2 DIABETES MELLITUS WITHOUT COMPLICATION, WITHOUT LONG-TERM CURRENT USE OF INSULIN (HCC): Primary | ICD-10-CM

## 2024-04-15 DIAGNOSIS — G25.81 RLS (RESTLESS LEGS SYNDROME): ICD-10-CM

## 2024-04-15 LAB — HBA1C MFR BLD: 6.6 %

## 2024-04-15 RX ORDER — ROPINIROLE 2 MG/1
2 TABLET, FILM COATED ORAL 3 TIMES DAILY
Qty: 90 TABLET | Refills: 3 | Status: SHIPPED | OUTPATIENT
Start: 2024-04-15

## 2024-04-15 SDOH — ECONOMIC STABILITY: FOOD INSECURITY: WITHIN THE PAST 12 MONTHS, THE FOOD YOU BOUGHT JUST DIDN'T LAST AND YOU DIDN'T HAVE MONEY TO GET MORE.: SOMETIMES TRUE

## 2024-04-15 SDOH — ECONOMIC STABILITY: FOOD INSECURITY: WITHIN THE PAST 12 MONTHS, YOU WORRIED THAT YOUR FOOD WOULD RUN OUT BEFORE YOU GOT MONEY TO BUY MORE.: SOMETIMES TRUE

## 2024-04-15 SDOH — ECONOMIC STABILITY: INCOME INSECURITY: HOW HARD IS IT FOR YOU TO PAY FOR THE VERY BASICS LIKE FOOD, HOUSING, MEDICAL CARE, AND HEATING?: VERY HARD

## 2024-04-15 SDOH — ECONOMIC STABILITY: HOUSING INSECURITY
IN THE LAST 12 MONTHS, WAS THERE A TIME WHEN YOU DID NOT HAVE A STEADY PLACE TO SLEEP OR SLEPT IN A SHELTER (INCLUDING NOW)?: NO

## 2024-04-15 NOTE — PROGRESS NOTES
Michael Luther (:  1960) is a 63 y.o. male,Established patient, here for evaluation of the following chief complaint(s):  3 Month Follow-Up and Diabetes         ASSESSMENT/PLAN:  1. Type 2 diabetes mellitus without complication, without long-term current use of insulin ([facility])  -     POCT glycosylated hemoglobin (Hb A1C)  Hemoglobin A1C   Date Value Ref Range Status   04/15/2024 6.6 % Final     Stressed the long-term nature of this disease and that we need to continue to monitor it carefully.  It is well-controlled at this time.  Continue current medications.  Discussed good dietary strategies for control as well.  2. RLS (restless legs syndrome)  -     rOPINIRole (REQUIP) 2 MG tablet; Take 1 tablet by mouth 3 times daily, Disp-90 tablet, R-3Normal  Continuing to cause him some issues.  Possible that he could have movement disorder other than just restless leg syndrome.  Will increase Requip to 2 mg 3 times a day and see if this is helpful.    No follow-ups on file.         Subjective   SUBJECTIVE/OBJECTIVE:  Michael Luther is a 63 y.o. male. Patient presents with:  3 Month Follow-Up  Diabetes      This 63-year-old male who has a history of diabetes and restless leg syndrome.  Diabetes has been fairly well-controlled although he does not check his laboratories or his blood sugars at home.  Patient has noted no polyuria polyphagia or polydipsia.  Tries to follow a good diet.  Has been overall feeling stable recently without significant increase in symptoms or hypoglycemic episodes.  He is generally trying to stay in good health.    Patient does have restless leg syndrome.  This affects him all the time.  He feels that he has to move his legs and they feel uncomfortable.  He has had some response to propranolol but it has always lost its effect over time.  He has had increasing dosages which do have a incremental increase in affect.  The patients PMH, surgical history, family history, medications, allergies

## 2024-04-22 RX ORDER — ROPINIROLE 1 MG/1
1 TABLET, FILM COATED ORAL 3 TIMES DAILY
Qty: 90 TABLET | Refills: 0 | Status: SHIPPED | OUTPATIENT
Start: 2024-04-22

## 2024-04-26 DIAGNOSIS — E11.9 TYPE 2 DIABETES MELLITUS WITHOUT COMPLICATION, WITHOUT LONG-TERM CURRENT USE OF INSULIN (HCC): ICD-10-CM

## 2024-04-26 RX ORDER — BLOOD SUGAR DIAGNOSTIC
STRIP MISCELLANEOUS
Qty: 100 STRIP | Refills: 3 | Status: SHIPPED | OUTPATIENT
Start: 2024-04-26

## 2024-04-26 NOTE — TELEPHONE ENCOUNTER
Last Office Visit  -  4/15/24  Next Office Visit  -  7/15/24    Last Filled  -    Last UDS -    Contract -

## 2024-05-13 DIAGNOSIS — J30.9 ALLERGIC RHINITIS, UNSPECIFIED SEASONALITY, UNSPECIFIED TRIGGER: ICD-10-CM

## 2024-05-13 DIAGNOSIS — J30.2 SEASONAL ALLERGIES: ICD-10-CM

## 2024-05-13 RX ORDER — FLUTICASONE PROPIONATE 50 MCG
2 SPRAY, SUSPENSION (ML) NASAL DAILY
Qty: 48 G | Refills: 1 | Status: SHIPPED | OUTPATIENT
Start: 2024-05-13

## 2024-05-13 RX ORDER — MONTELUKAST SODIUM 10 MG/1
10 TABLET ORAL DAILY
Qty: 90 TABLET | Refills: 1 | Status: SHIPPED | OUTPATIENT
Start: 2024-05-13

## 2024-05-13 NOTE — TELEPHONE ENCOUNTER
Last Office Visit  -  4/15/24  Next Office Visit  -  7/15/24    Last Filled  -  12/28/23  Last UDS -    Contract -

## 2024-05-20 RX ORDER — ROPINIROLE 1 MG/1
1 TABLET, FILM COATED ORAL 3 TIMES DAILY
Qty: 270 TABLET | Refills: 0 | Status: SHIPPED | OUTPATIENT
Start: 2024-05-20

## 2024-06-05 ENCOUNTER — TELEPHONE (OUTPATIENT)
Dept: FAMILY MEDICINE CLINIC | Age: 64
End: 2024-06-05

## 2024-06-05 DIAGNOSIS — G25.81 RLS (RESTLESS LEGS SYNDROME): ICD-10-CM

## 2024-06-05 RX ORDER — GABAPENTIN 300 MG/1
900 CAPSULE ORAL 3 TIMES DAILY
Qty: 810 CAPSULE | Refills: 0 | Status: SHIPPED | OUTPATIENT
Start: 2024-06-05 | End: 2024-09-03

## 2024-06-05 NOTE — TELEPHONE ENCOUNTER
Pt requesting early refill on Gabapentin. Pt reported that he has been taking 3 capsules TID and not 1 cap TID like the rx states. Pt c/o increase in RLS sx and that is the reason he has been over taking med. Please advise.

## 2024-06-11 ENCOUNTER — OFFICE VISIT (OUTPATIENT)
Dept: FAMILY MEDICINE CLINIC | Age: 64
End: 2024-06-11
Payer: MEDICARE

## 2024-06-11 VITALS
BODY MASS INDEX: 22.66 KG/M2 | DIASTOLIC BLOOD PRESSURE: 60 MMHG | HEART RATE: 89 BPM | HEIGHT: 69 IN | OXYGEN SATURATION: 97 % | SYSTOLIC BLOOD PRESSURE: 114 MMHG | WEIGHT: 153 LBS

## 2024-06-11 DIAGNOSIS — G25.81 RLS (RESTLESS LEGS SYNDROME): Primary | ICD-10-CM

## 2024-06-11 DIAGNOSIS — R20.0 LEG NUMBNESS: ICD-10-CM

## 2024-06-11 DIAGNOSIS — E53.8 B12 DEFICIENCY: ICD-10-CM

## 2024-06-11 PROCEDURE — 99214 OFFICE O/P EST MOD 30 MIN: CPT | Performed by: FAMILY MEDICINE

## 2024-06-11 PROCEDURE — 1123F ACP DISCUSS/DSCN MKR DOCD: CPT | Performed by: FAMILY MEDICINE

## 2024-06-11 PROCEDURE — 96372 THER/PROPH/DIAG INJ SC/IM: CPT | Performed by: FAMILY MEDICINE

## 2024-06-11 RX ORDER — CYANOCOBALAMIN 1000 UG/ML
1000 INJECTION, SOLUTION INTRAMUSCULAR; SUBCUTANEOUS ONCE
Status: COMPLETED | OUTPATIENT
Start: 2024-06-11 | End: 2024-06-11

## 2024-06-11 RX ADMIN — CYANOCOBALAMIN 1000 MCG: 1000 INJECTION, SOLUTION INTRAMUSCULAR; SUBCUTANEOUS at 16:23

## 2024-06-14 ENCOUNTER — HOSPITAL ENCOUNTER (EMERGENCY)
Age: 64
Discharge: HOME | End: 2024-06-14
Payer: MEDICARE

## 2024-06-14 ENCOUNTER — APPOINTMENT (OUTPATIENT)
Dept: GENERAL RADIOLOGY | Age: 64
End: 2024-06-14
Payer: MEDICARE

## 2024-06-14 VITALS
DIASTOLIC BLOOD PRESSURE: 73 MMHG | TEMPERATURE: 98.2 F | RESPIRATION RATE: 16 BRPM | HEIGHT: 69 IN | HEART RATE: 75 BPM | SYSTOLIC BLOOD PRESSURE: 115 MMHG | BODY MASS INDEX: 22.93 KG/M2 | OXYGEN SATURATION: 98 % | WEIGHT: 154.8 LBS

## 2024-06-14 DIAGNOSIS — K90.89 BILE SALT-INDUCED DIARRHEA: ICD-10-CM

## 2024-06-14 DIAGNOSIS — G25.81 RLS (RESTLESS LEGS SYNDROME): ICD-10-CM

## 2024-06-14 DIAGNOSIS — S29.011A MUSCLE STRAIN OF CHEST WALL, INITIAL ENCOUNTER: Primary | ICD-10-CM

## 2024-06-14 DIAGNOSIS — F32.A DEPRESSION, UNSPECIFIED DEPRESSION TYPE: ICD-10-CM

## 2024-06-14 LAB
EKG ATRIAL RATE: 67 BPM
EKG DIAGNOSIS: NORMAL
EKG P AXIS: 80 DEGREES
EKG P-R INTERVAL: 176 MS
EKG Q-T INTERVAL: 440 MS
EKG QRS DURATION: 146 MS
EKG QTC CALCULATION (BAZETT): 464 MS
EKG R AXIS: -47 DEGREES
EKG T AXIS: 92 DEGREES
EKG VENTRICULAR RATE: 67 BPM

## 2024-06-14 PROCEDURE — 6370000000 HC RX 637 (ALT 250 FOR IP): Performed by: NURSE PRACTITIONER

## 2024-06-14 PROCEDURE — 99284 EMERGENCY DEPT VISIT MOD MDM: CPT

## 2024-06-14 PROCEDURE — 71101 X-RAY EXAM UNILAT RIBS/CHEST: CPT

## 2024-06-14 PROCEDURE — 93010 ELECTROCARDIOGRAM REPORT: CPT | Performed by: INTERNAL MEDICINE

## 2024-06-14 PROCEDURE — 93005 ELECTROCARDIOGRAM TRACING: CPT | Performed by: NURSE PRACTITIONER

## 2024-06-14 RX ORDER — LIDOCAINE 4 G/G
1 PATCH TOPICAL ONCE
Status: DISCONTINUED | OUTPATIENT
Start: 2024-06-14 | End: 2024-06-14 | Stop reason: HOSPADM

## 2024-06-14 RX ORDER — LIDOCAINE 50 MG/G
1 PATCH TOPICAL DAILY
Qty: 10 PATCH | Refills: 0 | Status: SHIPPED | OUTPATIENT
Start: 2024-06-14 | End: 2024-06-24

## 2024-06-14 RX ORDER — NAPROXEN 250 MG/1
250 TABLET ORAL 2 TIMES DAILY WITH MEALS
Qty: 20 TABLET | Refills: 1 | Status: SHIPPED | OUTPATIENT
Start: 2024-06-14

## 2024-06-14 ASSESSMENT — ENCOUNTER SYMPTOMS
COLOR CHANGE: 0
FACIAL SWELLING: 0
ABDOMINAL PAIN: 0
SORE THROAT: 0
RHINORRHEA: 0
SHORTNESS OF BREATH: 0

## 2024-06-14 ASSESSMENT — PAIN - FUNCTIONAL ASSESSMENT: PAIN_FUNCTIONAL_ASSESSMENT: 0-10

## 2024-06-14 ASSESSMENT — PAIN SCALES - GENERAL: PAINLEVEL_OUTOF10: 4

## 2024-06-14 ASSESSMENT — PAIN DESCRIPTION - PAIN TYPE: TYPE: ACUTE PAIN

## 2024-06-14 ASSESSMENT — PAIN DESCRIPTION - ORIENTATION: ORIENTATION: RIGHT

## 2024-06-14 ASSESSMENT — PAIN DESCRIPTION - LOCATION: LOCATION: RIB CAGE

## 2024-06-14 NOTE — ED PROVIDER NOTES
[facility] ED  EMERGENCY DEPARTMENT ENCOUNTER      I am the Primary Clinician of Record    Note started: 2:25 PM EDT 6/14/24    YOUSIF. I have evaluated this patient.          Pt Name: Michael Luther  MRN: [id number]  Birthdate 1960  Dateof evaluation: 6/14/2024  Provider: Joanne Fitch, APRN - CNP  PCP: Nando Blanco MD  ED Attending: No att. providers found      CHIEF COMPLAINT       Chief Complaint   Patient presents with    Rib Pain (injury)     Patient reports he fell asleep in recliner, now right side of ribs sore.        HISTORY OF PRESENTILLNESS   (Location/Symptom, Timing/Onset, Context/Setting, Quality, Duration, Modifying Factors, Severity)  Note limiting factors.     Michael Luther is a 63 y.o. male for right rib pain. Onset was today.  Context includes patient states that he fell asleep on the recliner and is now having pain to his right upper rib cage.  He denies any chest pain or shortness of breath he does have reproducible tenderness with palpation. Alleviating factors include nothing.  Aggravating factors include nothing. Pain is 4/10.  Motrin has been used for pain today.     Nursing Notes were all reviewed and agreed with or any disagreements were addressed  in the HPI.      REVIEW OF SYSTEMS       Review of Systems   Constitutional:  Negative for activity change, appetite change and fever.   HENT:  Negative for congestion, facial swelling, rhinorrhea and sore throat.    Eyes:  Negative for visual disturbance.   Respiratory:  Negative for shortness of breath.         Rib pain   Cardiovascular:  Negative for chest pain.   Gastrointestinal:  Negative for abdominal pain.   Genitourinary:  Negative for difficulty urinating.   Musculoskeletal:  Negative for arthralgias and myalgias.   Skin:  Negative for color change and rash.   Neurological:  Negative for dizziness and light-headedness.   Psychiatric/Behavioral:  Negative for agitation.    All other systems reviewed

## 2024-06-17 RX ORDER — FLUOXETINE HYDROCHLORIDE 20 MG/1
CAPSULE ORAL
Qty: 90 CAPSULE | Refills: 1 | Status: SHIPPED | OUTPATIENT
Start: 2024-06-17

## 2024-06-17 RX ORDER — MONTELUKAST SODIUM 4 MG/1
1 TABLET, CHEWABLE ORAL 2 TIMES DAILY
Qty: 180 TABLET | Refills: 1 | Status: SHIPPED | OUTPATIENT
Start: 2024-06-17

## 2024-06-17 RX ORDER — ROPINIROLE 2 MG/1
2 TABLET, FILM COATED ORAL 3 TIMES DAILY
Qty: 270 TABLET | Refills: 1 | Status: SHIPPED | OUTPATIENT
Start: 2024-06-17

## 2024-07-01 DIAGNOSIS — G25.81 RLS (RESTLESS LEGS SYNDROME): ICD-10-CM

## 2024-07-01 RX ORDER — GABAPENTIN 300 MG/1
CAPSULE ORAL
Qty: 270 CAPSULE | Refills: 0 | Status: SHIPPED | OUTPATIENT
Start: 2024-07-01 | End: 2024-09-29

## 2024-07-01 NOTE — TELEPHONE ENCOUNTER
Last Office Visit  -  06/11/2024  Next Office Visit  -  07//15/2024    Last Filled  -  06/05/2024  Last UDS -    Contract -

## 2024-07-15 ENCOUNTER — OFFICE VISIT (OUTPATIENT)
Dept: FAMILY MEDICINE CLINIC | Age: 64
End: 2024-07-15
Payer: MEDICARE

## 2024-07-15 ENCOUNTER — TELEPHONE (OUTPATIENT)
Dept: FAMILY MEDICINE CLINIC | Age: 64
End: 2024-07-15

## 2024-07-15 VITALS
DIASTOLIC BLOOD PRESSURE: 50 MMHG | BODY MASS INDEX: 23.11 KG/M2 | HEIGHT: 69 IN | OXYGEN SATURATION: 98 % | SYSTOLIC BLOOD PRESSURE: 120 MMHG | HEART RATE: 76 BPM | WEIGHT: 156 LBS

## 2024-07-15 DIAGNOSIS — R20.0 LEG NUMBNESS: ICD-10-CM

## 2024-07-15 DIAGNOSIS — F32.A DEPRESSION, UNSPECIFIED DEPRESSION TYPE: ICD-10-CM

## 2024-07-15 DIAGNOSIS — F17.210 CIGARETTE NICOTINE DEPENDENCE WITHOUT COMPLICATION: ICD-10-CM

## 2024-07-15 DIAGNOSIS — E11.9 TYPE 2 DIABETES MELLITUS WITHOUT COMPLICATION, WITHOUT LONG-TERM CURRENT USE OF INSULIN (HCC): Primary | ICD-10-CM

## 2024-07-15 DIAGNOSIS — R26.81 GAIT INSTABILITY: ICD-10-CM

## 2024-07-15 LAB — HBA1C MFR BLD: 7 %

## 2024-07-15 PROCEDURE — 1123F ACP DISCUSS/DSCN MKR DOCD: CPT | Performed by: FAMILY MEDICINE

## 2024-07-15 PROCEDURE — 99214 OFFICE O/P EST MOD 30 MIN: CPT | Performed by: FAMILY MEDICINE

## 2024-07-15 PROCEDURE — 83036 HEMOGLOBIN GLYCOSYLATED A1C: CPT | Performed by: FAMILY MEDICINE

## 2024-07-15 PROCEDURE — G2211 COMPLEX E/M VISIT ADD ON: HCPCS | Performed by: FAMILY MEDICINE

## 2024-07-15 PROCEDURE — 3051F HG A1C>EQUAL 7.0%<8.0%: CPT | Performed by: FAMILY MEDICINE

## 2024-07-15 RX ORDER — NICOTINE 21 MG/24HR
1 PATCH, TRANSDERMAL 24 HOURS TRANSDERMAL DAILY
Qty: 42 PATCH | Refills: 0 | Status: SHIPPED | OUTPATIENT
Start: 2024-07-15 | End: 2024-08-26

## 2024-07-15 NOTE — TELEPHONE ENCOUNTER
[facility] called- pt needs new order for clawed cane, office notes also needed to support need of cane. Fax [phone]

## 2024-07-15 NOTE — TELEPHONE ENCOUNTER
Lore Toure called from [facility], needs office notes/documentation that supports the use & need for a cane   () [phone]  (F) [phone]

## 2024-07-15 NOTE — PROGRESS NOTES
Michael Luther (:  1960) is a 63 y.o. male,Established patient, here for evaluation of the following chief complaint(s):  3 Month Follow-Up and Diabetes      Assessment & Plan   1. Type 2 diabetes mellitus without complication, without long-term current use of insulin (HCC)  -     DME Order for Cane as OP  -     POCT glycosylated hemoglobin (Hb A1C)  Hemoglobin A1C   Date Value Ref Range Status   07/15/2024 7.0 % Final     Well-controlled at this time.  Continue current medication.  2. Gait instability  -     DME Order for Cane as OP  I do believe that patient would benefit from a cane.  3. Cigarette nicotine dependence without complication  -     nicotine (NICODERM CQ) 21 MG/24HR; Place 1 patch onto the skin daily, Disp-42 patch, R-0Normal  4. Depression, unspecified depression type  This has been fair control at this time but seems to be situational in nature.  5. Leg numbness  Follow-up with neurology as an outpatient.    No follow-ups on file.       Subjective   Michael Luther is a 63 y.o. male. Patient presents with:  3 Month Follow-Up  Diabetes      63-year-old male who presents for the following conditions:    1. Type 2 diabetes mellitus without complication, without long-term current use of insulin   Patient has been taking his medications regularly.  Denies polyuria polyphagia or polydipsia.  Notes that this has been worsening over time with his sugar controls but generally he has been trying to watch these things.    2. Gait instability  Has been having progressive instability of his gait due to leg numbness.  He does have a appointment to be seen I believe by neurology.  Patient has not been improving with measures to address restless leg syndrome and or neuropathy.    3. Cigarette nicotine dependence without complication  Patient continues to smoke up to 3 packs/day.  Patient would like to quit but is having a struggle with this.    4. Depression, unspecified depression type  Patient's

## 2024-07-17 NOTE — TELEPHONE ENCOUNTER
[facility] is requesting the prescription for the quad cane to be faxed over in order to supply the cane to the pt.

## 2024-07-22 DIAGNOSIS — F32.A DEPRESSION, UNSPECIFIED DEPRESSION TYPE: ICD-10-CM

## 2024-07-22 RX ORDER — FLUOXETINE HYDROCHLORIDE 20 MG/1
CAPSULE ORAL
Qty: 90 CAPSULE | Refills: 3 | Status: SHIPPED | OUTPATIENT
Start: 2024-07-22

## 2024-08-26 ENCOUNTER — TELEPHONE (OUTPATIENT)
Age: 64
End: 2024-08-26

## 2024-08-26 ENCOUNTER — HOSPITAL ENCOUNTER (OUTPATIENT)
Age: 64
Discharge: HOME | End: 2024-08-26
Payer: MEDICARE

## 2024-08-26 ENCOUNTER — OFFICE VISIT (OUTPATIENT)
Age: 64
End: 2024-08-26
Payer: MEDICARE

## 2024-08-26 VITALS
HEART RATE: 83 BPM | DIASTOLIC BLOOD PRESSURE: 74 MMHG | OXYGEN SATURATION: 96 % | BODY MASS INDEX: 23.55 KG/M2 | SYSTOLIC BLOOD PRESSURE: 128 MMHG | WEIGHT: 159 LBS | RESPIRATION RATE: 16 BRPM | HEIGHT: 69 IN

## 2024-08-26 DIAGNOSIS — E61.1 IRON DEFICIENCY: ICD-10-CM

## 2024-08-26 DIAGNOSIS — G62.9 NEUROPATHY: ICD-10-CM

## 2024-08-26 DIAGNOSIS — G25.81 RESTLESS LEG SYNDROME: Primary | ICD-10-CM

## 2024-08-26 PROCEDURE — 82607 VITAMIN B-12: CPT

## 2024-08-26 PROCEDURE — 83540 ASSAY OF IRON: CPT

## 2024-08-26 PROCEDURE — 99204 OFFICE O/P NEW MOD 45 MIN: CPT | Performed by: PSYCHIATRY & NEUROLOGY

## 2024-08-26 PROCEDURE — 82728 ASSAY OF FERRITIN: CPT

## 2024-08-26 PROCEDURE — 36415 COLL VENOUS BLD VENIPUNCTURE: CPT

## 2024-08-26 PROCEDURE — 82746 ASSAY OF FOLIC ACID SERUM: CPT

## 2024-08-26 PROCEDURE — 1123F ACP DISCUSS/DSCN MKR DOCD: CPT | Performed by: PSYCHIATRY & NEUROLOGY

## 2024-08-26 PROCEDURE — 83550 IRON BINDING TEST: CPT

## 2024-08-26 NOTE — PATIENT INSTRUCTIONS

## 2024-08-26 NOTE — TELEPHONE ENCOUNTER
Pt had NP consult today with Dr. Prasad & he ordered labs.  Per pt - said Dr. Prasad would determine if meds need to be sent in after he saw results of labs from today.

## 2024-08-26 NOTE — PROGRESS NOTES
Neurology outpatient new visit    Patient name: Michael Luther      Chief Complaint:  Intermittent muscle cramps on both legs.    History of present illness:  This is a 64 years old right-handed male.  The patient is here for evaluation of intermittent muscle cramps on both legs.  It usually occurs at rest or during sleep.  The patient reports some improvement with walking.  The patient has had this problem for more than 10 years.  The patient is currently on gabapentin and ropinirole from his PCP.  Unfortunately, the patient denies significant benefit from those medication.  The patient denies significant tremor or frequent falls.  The patient also denies significant side effects from gabapentin or ropinirole.      Past medical history:    Past Medical History:   Diagnosis Date    A-fib (HCC)     Acid reflux     Cancer (HCC)     wife denies    Cholelithiasis 03/19/2019    Depression     DM (diabetes mellitus) (HCC)     no meds    History of left bundle branch block (LBBB)     HX OTHER MEDICAL     \"blood doesn't flow fast enough into heart\"    Wears dentures        Past surgical history:    Past Surgical History:   Procedure Laterality Date    APPENDECTOMY      CHOLECYSTECTOMY, LAPAROSCOPIC N/A 3/19/2019    LAPAROSCOPIC CHOLECYSTECTOMY WITH INTRAOPERATIVE, CHOLANGIOGRAM performed by Monroe Odell MD at [facility] OR    COLONOSCOPY          Medication:    Current Outpatient Medications   Medication Sig Dispense Refill    FLUoxetine (PROZAC) 20 MG capsule Take 1 capsule by mouth daily. 90 capsule 3    nicotine (NICODERM CQ) 21 MG/24HR Place 1 patch onto the skin daily 42 patch 0    gabapentin (NEURONTIN) 300 MG capsule Take 1 capsule by mouth in the morning, at noon, and at bedtime. 270 capsule 0    colestipol (COLESTID) 1 g tablet TAKE 1 TABLET BY MOUTH TWICE A  tablet 1    rOPINIRole (REQUIP) 2 MG tablet TAKE 1 TABLET BY MOUTH THREE TIMES A  tablet 1    naproxen (NAPROSYN) 250 MG tablet Take 1

## 2024-08-27 LAB
FERRITIN SERPL IA-MCNC: 40.6 NG/ML (ref 30–400)
FOLATE SERPL-MCNC: 26.9 NG/ML (ref 4.78–24.2)
IRON SATN MFR SERPL: 23 % (ref 20–50)
IRON SERPL-MCNC: 77 UG/DL (ref 59–158)
TIBC SERPL-MCNC: 340 UG/DL (ref 260–445)
VIT B12 SERPL-MCNC: 299 PG/ML (ref 211–911)

## 2024-08-27 RX ORDER — PREGABALIN 50 MG/1
50 CAPSULE ORAL 2 TIMES DAILY
Qty: 60 CAPSULE | Refills: 2 | Status: SHIPPED | OUTPATIENT
Start: 2024-08-27 | End: 2024-11-25

## 2024-08-27 RX ORDER — FERROUS SULFATE 325(65) MG
325 TABLET, DELAYED RELEASE (ENTERIC COATED) ORAL
Qty: 90 TABLET | Refills: 1 | Status: SHIPPED | OUTPATIENT
Start: 2024-08-27

## 2024-08-28 ENCOUNTER — TELEPHONE (OUTPATIENT)
Age: 64
End: 2024-08-28

## 2024-09-17 DIAGNOSIS — G25.81 RLS (RESTLESS LEGS SYNDROME): ICD-10-CM

## 2024-09-17 RX ORDER — GABAPENTIN 300 MG/1
300 CAPSULE ORAL 3 TIMES DAILY
Qty: 270 CAPSULE | Refills: 0 | Status: SHIPPED | OUTPATIENT
Start: 2024-09-17 | End: 2024-12-16

## 2024-10-03 DIAGNOSIS — G25.81 RLS (RESTLESS LEGS SYNDROME): ICD-10-CM

## 2024-10-03 RX ORDER — GABAPENTIN 300 MG/1
300 CAPSULE ORAL 3 TIMES DAILY
Qty: 270 CAPSULE | Refills: 0 | Status: SHIPPED | OUTPATIENT
Start: 2024-10-03 | End: 2025-01-01

## 2024-10-03 NOTE — TELEPHONE ENCOUNTER
Patient is requesting a rx for gabapentin    Last seen 7/15/2024        Next visit 10/15/2024    To the [facility] pharmacy on file

## 2024-10-15 ENCOUNTER — OFFICE VISIT (OUTPATIENT)
Dept: FAMILY MEDICINE CLINIC | Age: 64
End: 2024-10-15

## 2024-10-15 VITALS
SYSTOLIC BLOOD PRESSURE: 124 MMHG | BODY MASS INDEX: 23.85 KG/M2 | HEART RATE: 74 BPM | WEIGHT: 161 LBS | HEIGHT: 69 IN | OXYGEN SATURATION: 98 % | DIASTOLIC BLOOD PRESSURE: 68 MMHG

## 2024-10-15 DIAGNOSIS — G25.81 RLS (RESTLESS LEGS SYNDROME): ICD-10-CM

## 2024-10-15 DIAGNOSIS — K90.89 BILE SALT-INDUCED DIARRHEA: ICD-10-CM

## 2024-10-15 DIAGNOSIS — E11.9 TYPE 2 DIABETES MELLITUS WITHOUT COMPLICATION, WITHOUT LONG-TERM CURRENT USE OF INSULIN (HCC): Primary | ICD-10-CM

## 2024-10-15 DIAGNOSIS — F32.A DEPRESSION, UNSPECIFIED DEPRESSION TYPE: ICD-10-CM

## 2024-10-15 PROBLEM — D61.818 PANCYTOPENIA (HCC): Status: RESOLVED | Noted: 2020-06-01 | Resolved: 2024-10-15

## 2024-10-15 LAB — HBA1C MFR BLD: 8 %

## 2024-10-15 RX ORDER — MONTELUKAST SODIUM 4 MG/1
1 TABLET, CHEWABLE ORAL 2 TIMES DAILY
Qty: 180 TABLET | Refills: 0 | Status: SHIPPED | OUTPATIENT
Start: 2024-10-15

## 2024-10-21 ENCOUNTER — APPOINTMENT (OUTPATIENT)
Dept: CT IMAGING | Age: 64
End: 2024-10-21
Payer: MEDICARE

## 2024-10-21 ENCOUNTER — APPOINTMENT (OUTPATIENT)
Dept: ULTRASOUND IMAGING | Age: 64
End: 2024-10-21
Payer: MEDICARE

## 2024-10-21 ENCOUNTER — HOSPITAL ENCOUNTER (EMERGENCY)
Age: 64
Discharge: HOME | End: 2024-10-21
Attending: STUDENT IN AN ORGANIZED HEALTH CARE EDUCATION/TRAINING PROGRAM
Payer: MEDICARE

## 2024-10-21 VITALS
HEIGHT: 69 IN | BODY MASS INDEX: 23.93 KG/M2 | HEART RATE: 90 BPM | OXYGEN SATURATION: 99 % | WEIGHT: 161.6 LBS | RESPIRATION RATE: 18 BRPM | TEMPERATURE: 98.3 F | SYSTOLIC BLOOD PRESSURE: 110 MMHG | DIASTOLIC BLOOD PRESSURE: 69 MMHG

## 2024-10-21 DIAGNOSIS — N50.89 SKIN ULCER OF SCROTUM: ICD-10-CM

## 2024-10-21 DIAGNOSIS — B35.6 TINEA CRURIS: Primary | ICD-10-CM

## 2024-10-21 LAB
ALBUMIN SERPL-MCNC: 4.3 G/DL (ref 3.4–5)
ALBUMIN/GLOB SERPL: 1.7 {RATIO} (ref 1.1–2.2)
ALP SERPL-CCNC: 136 U/L (ref 40–129)
ALT SERPL-CCNC: 44 U/L (ref 10–40)
ANION GAP SERPL CALCULATED.3IONS-SCNC: 10 MMOL/L (ref 3–16)
AST SERPL-CCNC: 28 U/L (ref 15–37)
BASOPHILS # BLD: 0.1 K/UL (ref 0–0.2)
BASOPHILS NFR BLD: 1 %
BILIRUB SERPL-MCNC: 0.4 MG/DL (ref 0–1)
BUN SERPL-MCNC: 14 MG/DL (ref 7–20)
CALCIUM SERPL-MCNC: 9.4 MG/DL (ref 8.3–10.6)
CHLORIDE SERPL-SCNC: 96 MMOL/L (ref 99–110)
CO2 SERPL-SCNC: 26 MMOL/L (ref 21–32)
CREAT SERPL-MCNC: 0.7 MG/DL (ref 0.8–1.3)
CRP SERPL-MCNC: 11.4 MG/L (ref 0–5.1)
DEPRECATED RDW RBC AUTO: 14 % (ref 12.4–15.4)
EOSINOPHIL # BLD: 0.3 K/UL (ref 0–0.6)
EOSINOPHIL NFR BLD: 4.9 %
GFR SERPLBLD CREATININE-BSD FMLA CKD-EPI: >90 ML/MIN/{1.73_M2}
GLUCOSE SERPL-MCNC: 138 MG/DL (ref 70–99)
HCT VFR BLD AUTO: 41.5 % (ref 40.5–52.5)
HGB BLD-MCNC: 14.4 G/DL (ref 13.5–17.5)
LACTATE BLDV-SCNC: 1 MMOL/L (ref 0.4–2)
LYMPHOCYTES # BLD: 2.4 K/UL (ref 1–5.1)
LYMPHOCYTES NFR BLD: 35.9 %
MCH RBC QN AUTO: 30.9 PG (ref 26–34)
MCHC RBC AUTO-ENTMCNC: 34.8 G/DL (ref 31–36)
MCV RBC AUTO: 88.7 FL (ref 80–100)
MONOCYTES # BLD: 0.6 K/UL (ref 0–1.3)
MONOCYTES NFR BLD: 8.7 %
NEUTROPHILS # BLD: 3.3 K/UL (ref 1.7–7.7)
NEUTROPHILS NFR BLD: 49.5 %
PLATELET # BLD AUTO: 187 K/UL (ref 135–450)
PMV BLD AUTO: 6.2 FL (ref 5–10.5)
POTASSIUM SERPL-SCNC: 4.1 MMOL/L (ref 3.5–5.1)
PROT SERPL-MCNC: 6.8 G/DL (ref 6.4–8.2)
RBC # BLD AUTO: 4.68 M/UL (ref 4.2–5.9)
SODIUM SERPL-SCNC: 132 MMOL/L (ref 136–145)
WBC # BLD AUTO: 6.7 K/UL (ref 4–11)

## 2024-10-21 PROCEDURE — 96366 THER/PROPH/DIAG IV INF ADDON: CPT

## 2024-10-21 PROCEDURE — 85025 COMPLETE CBC W/AUTO DIFF WBC: CPT

## 2024-10-21 PROCEDURE — 99285 EMERGENCY DEPT VISIT HI MDM: CPT

## 2024-10-21 PROCEDURE — 6360000002 HC RX W HCPCS: Performed by: STUDENT IN AN ORGANIZED HEALTH CARE EDUCATION/TRAINING PROGRAM

## 2024-10-21 PROCEDURE — 83605 ASSAY OF LACTIC ACID: CPT

## 2024-10-21 PROCEDURE — 86140 C-REACTIVE PROTEIN: CPT

## 2024-10-21 PROCEDURE — 2580000003 HC RX 258: Performed by: STUDENT IN AN ORGANIZED HEALTH CARE EDUCATION/TRAINING PROGRAM

## 2024-10-21 PROCEDURE — 6370000000 HC RX 637 (ALT 250 FOR IP)

## 2024-10-21 PROCEDURE — 6360000004 HC RX CONTRAST MEDICATION: Performed by: STUDENT IN AN ORGANIZED HEALTH CARE EDUCATION/TRAINING PROGRAM

## 2024-10-21 PROCEDURE — 96365 THER/PROPH/DIAG IV INF INIT: CPT

## 2024-10-21 PROCEDURE — 6360000002 HC RX W HCPCS

## 2024-10-21 PROCEDURE — 96367 TX/PROPH/DG ADDL SEQ IV INF: CPT

## 2024-10-21 PROCEDURE — 76870 US EXAM SCROTUM: CPT

## 2024-10-21 PROCEDURE — 80053 COMPREHEN METABOLIC PANEL: CPT

## 2024-10-21 PROCEDURE — 96375 TX/PRO/DX INJ NEW DRUG ADDON: CPT

## 2024-10-21 PROCEDURE — 72193 CT PELVIS W/DYE: CPT

## 2024-10-21 RX ORDER — CEFPODOXIME PROXETIL 200 MG/1
400 TABLET, FILM COATED ORAL 2 TIMES DAILY
Qty: 28 TABLET | Refills: 0 | Status: SHIPPED | OUTPATIENT
Start: 2024-10-21 | End: 2024-10-28

## 2024-10-21 RX ORDER — IOPAMIDOL 755 MG/ML
75 INJECTION, SOLUTION INTRAVASCULAR
Status: COMPLETED | OUTPATIENT
Start: 2024-10-21 | End: 2024-10-21

## 2024-10-21 RX ORDER — GABAPENTIN 300 MG/1
300 CAPSULE ORAL ONCE
Status: COMPLETED | OUTPATIENT
Start: 2024-10-21 | End: 2024-10-21

## 2024-10-21 RX ORDER — KETOCONAZOLE 20 MG/G
CREAM TOPICAL
Qty: 30 G | Refills: 0 | Status: SHIPPED | OUTPATIENT
Start: 2024-10-21

## 2024-10-21 RX ORDER — VANCOMYCIN 1.5 G/300ML
1500 INJECTION, SOLUTION INTRAVENOUS ONCE
Status: COMPLETED | OUTPATIENT
Start: 2024-10-21 | End: 2024-10-21

## 2024-10-21 RX ORDER — DIPHENHYDRAMINE HYDROCHLORIDE 50 MG/ML
25 INJECTION INTRAMUSCULAR; INTRAVENOUS ONCE
Status: COMPLETED | OUTPATIENT
Start: 2024-10-21 | End: 2024-10-21

## 2024-10-21 RX ORDER — METRONIDAZOLE 500 MG/1
500 TABLET ORAL 2 TIMES DAILY
Qty: 14 TABLET | Refills: 0 | Status: SHIPPED | OUTPATIENT
Start: 2024-10-21 | End: 2024-10-28

## 2024-10-21 RX ADMIN — VANCOMYCIN 1500 MG: 1.5 INJECTION, SOLUTION INTRAVENOUS at 16:53

## 2024-10-21 RX ADMIN — PIPERACILLIN AND TAZOBACTAM 3375 MG: 3; .375 INJECTION, POWDER, LYOPHILIZED, FOR SOLUTION INTRAVENOUS at 16:18

## 2024-10-21 RX ADMIN — DIPHENHYDRAMINE HYDROCHLORIDE 25 MG: 50 INJECTION INTRAMUSCULAR; INTRAVENOUS at 18:59

## 2024-10-21 RX ADMIN — IOPAMIDOL 75 ML: 755 INJECTION, SOLUTION INTRAVENOUS at 17:14

## 2024-10-21 RX ADMIN — GABAPENTIN 300 MG: 300 CAPSULE ORAL at 17:11

## 2024-10-21 ASSESSMENT — PAIN DESCRIPTION - DESCRIPTORS: DESCRIPTORS: ITCHING

## 2024-10-21 ASSESSMENT — PAIN DESCRIPTION - LOCATION: LOCATION: SCROTUM

## 2024-10-21 ASSESSMENT — PAIN - FUNCTIONAL ASSESSMENT: PAIN_FUNCTIONAL_ASSESSMENT: 0-10

## 2024-10-21 ASSESSMENT — PAIN SCALES - GENERAL: PAINLEVEL_OUTOF10: 5

## 2024-10-21 NOTE — ED NOTES
1836 - Perfect Serve sent to Dr. Jarret Donato for Urology consult    1838 - Dr. Donato called back and spoke with TIM Sotelo at this time   Statement Selected

## 2024-10-21 NOTE — DISCHARGE INSTRUCTIONS
A referral was given for urology, please call tomorrow to schedule a follow-up appointment for this week.  Please follow-up with your primary care provider.  Return to this department if you develop new or worsening symptoms including pain, continued spreading.  A prescription was given for

## 2024-10-21 NOTE — ED PROVIDER NOTES
[facility] ED  EMERGENCY DEPARTMENT ENCOUNTER        Pt Name: Michael Luther  MRN: [id number]  Birthdate 1960  Date of evaluation: 10/21/2024  Provider: TIM Parsons  PCP: Nando Blanco MD  Note Started: 12:15 PM EDT 10/21/24       I have seen and evaluated this patient with my supervising physician Graeme Patel MD.      CHIEF COMPLAINT       Chief Complaint   Patient presents with    Rash     Under scrotum x 1 week       HISTORY OF PRESENT ILLNESS: 1 or more Elements     History From: Patient    Limitations to history : None    Social Determinants Significantly Affecting Health : None    Chief Complaint: Rash    Michael Luther is a 64 y.o. male who presents to the emergency department with a rash.  Patient states for the last week he has had a itchy rash behind his scrotal area.  States that he thinks his wife may have changed laundry detergent otherwise denies changes in soaps, lotions, foods, clothing.  Denies pain or tenderness to the area.  Denies open wounds or sores.  Denies drainage from rash.  Denies fever, chills, nausea, vomiting.  Has been trying to put lotion on the area without relief.    Nursing Notes were all reviewed and agreed with or any disagreements were addressed in the HPI.    REVIEW OF SYSTEMS :      Review of Systems    Positives and Pertinent negatives as per HPI.     SURGICAL HISTORY     Past Surgical History:   Procedure Laterality Date    APPENDECTOMY      CHOLECYSTECTOMY, LAPAROSCOPIC N/A 3/19/2019    LAPAROSCOPIC CHOLECYSTECTOMY WITH INTRAOPERATIVE, CHOLANGIOGRAM performed by Monroe Odell MD at [facility] OR    COLONOSCOPY         CURRENTMEDICATIONS       Discharge Medication List as of 10/21/2024  7:18 PM        CONTINUE these medications which have NOT CHANGED    Details   colestipol (COLESTID) 1 g tablet Take 1 tablet by mouth twice daily., Disp-180 tablet, R-0Normal      gabapentin (NEURONTIN) 300 MG capsule Take 1 capsule by mouth 3

## 2024-10-21 NOTE — ED TRIAGE NOTES
Pt presents to ED for rash under scrotum. PT states that he thinks his wife changed laundry detergent last week.

## 2024-10-21 NOTE — ED PROVIDER NOTES
I independently performed a history and physical on Michael Luther.     I have discussed the case with the YOUSIF/resident at 1300 and approve / take responsibility for the initial management plan and anticipated disposition as documented below.     In summary the patient presents with concern for a painful skin rash about the groin.  It would seem there are 2 discrete processes.  He has had a more chronic erythematous rash of the left proximal thigh which on my examination I favor represents a tinea cruris amenable to routine outpatient care with topical antifungals.  Of greater concern the patient has a small dime size shallow ulcer of unclear acuity at the inferior convexity of the scrotum.  The patient denies trauma or other inciting factors.  While the rash on the thigh has been more chronic the ulcer seems to have developed over the past few days.  The patient denies fever chills or systemic symptoms.  Of note he is a diabetic however and states that his blood sugars have been high.  Regarding the ulcer there is some induration around the margins there is no fluctuance or crepitus remainder of the scrotum is benign.  The remainder of the patient's examination is also reassuring breath sounds clear abdomen soft nontender nondistended extremities warm and well-perfused.  He is clinically euvolemic and nontoxic-appearing.  Given his comorbidities however and with a painful ulcer along the scrotum well clinically have low suspicion for a necrotizing fasciitis or Constantino's gangrene we will advance his workup to further characterize and his disposition will be pending lab evaluation discussion with urology and overall patient clinical course.    I interpreted the following studies:  na    I personally discussed the patient's management with the following:  na    ED Course as of 10/21/24 1800   Mon Oct 21, 2024   1757 Of note the patient was started on broad-spectrum antibiotics as a precaution while his workup was

## 2024-10-26 NOTE — PROGRESS NOTES
Michael Luther (:  1960) is a 64 y.o. male,Established patient, here for evaluation of the following chief complaint(s):  3 Month Follow-Up and Diabetes         Assessment & Plan  Type 2 diabetes mellitus without complication, without long-term current use of insulin (HCC)   Chronic, not at goal (unstable),   , medication adherence emphasized, and lifestyle modifications recommended    Orders:    POCT glycosylated hemoglobin (Hb A1C)    Hemoglobin A1C   Date Value Ref Range Status   10/15/2024 8.0 % Final       Depression, unspecified depression type   Chronic, at goal (stable), continue current treatment plan         RLS (restless legs syndrome)   Chronic, at goal (stable), continue current treatment plan           No follow-ups on file.       Subjective   Michael Luther is a 64 y.o. male. Patient presents with:  3 Month Follow-Up  Diabetes    64-year-old male presents for follow-up of multiple medical conditions.  Patient has diabetes mellitus.  Has been controlling his diet and taking his medications regularly.  Denies any exacerbation of symptoms.  Denies any significant high or low blood sugars at home.    Patient has restless leg syndrome.  Continues on therapy for that.  Patient notes that this is worsening over time but well-controlled on medications at this time.    Depression has been well-controlled.  Patient notes that this has been well-controlled.  He has been feeling like he is in good mood in general.  No other concerns at this time.    The patients PMH, surgical history, family history, medications, allergies were all reviewed and updated as appropriate today.      Diabetes        Review of Systems       Objective   Physical Exam  Vitals and nursing note reviewed.   Constitutional:       Appearance: Normal appearance. He is well-developed.   HENT:      Head: Normocephalic and atraumatic.      Right Ear: External ear normal.      Left Ear: External ear normal.      Nose: Nose normal.

## 2024-10-26 NOTE — ASSESSMENT & PLAN NOTE
Chronic, not at goal (unstable),  , medication adherence emphasized, and lifestyle modifications recommended    Orders:    POCT glycosylated hemoglobin (Hb A1C)    Hemoglobin A1C   Date Value Ref Range Status   10/15/2024 8.0 % Final

## 2024-11-13 DIAGNOSIS — G25.81 RLS (RESTLESS LEGS SYNDROME): ICD-10-CM

## 2024-11-13 RX ORDER — ROPINIROLE 2 MG/1
2 TABLET, FILM COATED ORAL 3 TIMES DAILY
Qty: 270 TABLET | Refills: 1 | Status: SHIPPED | OUTPATIENT
Start: 2024-11-13

## 2024-11-13 RX ORDER — GABAPENTIN 300 MG/1
CAPSULE ORAL
Qty: 810 CAPSULE | Refills: 0 | Status: SHIPPED | OUTPATIENT
Start: 2024-11-13 | End: 2024-12-13

## 2024-11-13 NOTE — TELEPHONE ENCOUNTER
Patient is requesting a rx for rOPINIRole (REQUIP) 2 MG tablet .  [facility]   Last seen 10/15/24    Next office visit   11/25/2024

## 2024-11-13 NOTE — TELEPHONE ENCOUNTER
Last Office Visit  -  10/15/24  Next Office Visit  -  11/25/24    Last Filled  -  6/17/24  Last UDS -    Contract -     negative...

## 2024-11-25 ENCOUNTER — OFFICE VISIT (OUTPATIENT)
Dept: FAMILY MEDICINE CLINIC | Age: 64
End: 2024-11-25

## 2024-11-25 VITALS
DIASTOLIC BLOOD PRESSURE: 58 MMHG | SYSTOLIC BLOOD PRESSURE: 112 MMHG | OXYGEN SATURATION: 98 % | BODY MASS INDEX: 23.85 KG/M2 | HEIGHT: 69 IN | HEART RATE: 90 BPM | WEIGHT: 161 LBS

## 2024-11-25 DIAGNOSIS — Z12.5 SCREENING FOR MALIGNANT NEOPLASM OF PROSTATE: ICD-10-CM

## 2024-11-25 DIAGNOSIS — Z13.6 SCREENING FOR AAA (ABDOMINAL AORTIC ANEURYSM): ICD-10-CM

## 2024-11-25 DIAGNOSIS — Z00.00 MEDICARE ANNUAL WELLNESS VISIT, SUBSEQUENT: Primary | ICD-10-CM

## 2024-11-25 DIAGNOSIS — E11.9 TYPE 2 DIABETES MELLITUS WITHOUT COMPLICATION, WITHOUT LONG-TERM CURRENT USE OF INSULIN (HCC): ICD-10-CM

## 2024-11-25 DIAGNOSIS — J44.9 CHRONIC OBSTRUCTIVE PULMONARY DISEASE, UNSPECIFIED COPD TYPE (HCC): ICD-10-CM

## 2024-11-25 DIAGNOSIS — E11.65 TYPE 2 DIABETES MELLITUS WITH HYPERGLYCEMIA, WITHOUT LONG-TERM CURRENT USE OF INSULIN (HCC): ICD-10-CM

## 2024-11-25 DIAGNOSIS — Z11.59 NEED FOR HEPATITIS C SCREENING TEST: ICD-10-CM

## 2024-11-25 DIAGNOSIS — F32.A DEPRESSION, UNSPECIFIED DEPRESSION TYPE: ICD-10-CM

## 2024-11-25 ASSESSMENT — LIFESTYLE VARIABLES
HOW OFTEN DO YOU HAVE A DRINK CONTAINING ALCOHOL: NEVER
HOW MANY STANDARD DRINKS CONTAINING ALCOHOL DO YOU HAVE ON A TYPICAL DAY: PATIENT DOES NOT DRINK

## 2024-11-25 ASSESSMENT — PATIENT HEALTH QUESTIONNAIRE - PHQ9
7. TROUBLE CONCENTRATING ON THINGS, SUCH AS READING THE NEWSPAPER OR WATCHING TELEVISION: SEVERAL DAYS
9. THOUGHTS THAT YOU WOULD BE BETTER OFF DEAD, OR OF HURTING YOURSELF: NOT AT ALL
6. FEELING BAD ABOUT YOURSELF - OR THAT YOU ARE A FAILURE OR HAVE LET YOURSELF OR YOUR FAMILY DOWN: NOT AT ALL
5. POOR APPETITE OR OVEREATING: NOT AT ALL
2. FEELING DOWN, DEPRESSED OR HOPELESS: SEVERAL DAYS
SUM OF ALL RESPONSES TO PHQ QUESTIONS 1-9: 5
3. TROUBLE FALLING OR STAYING ASLEEP: MORE THAN HALF THE DAYS
SUM OF ALL RESPONSES TO PHQ QUESTIONS 1-9: 5
SUM OF ALL RESPONSES TO PHQ QUESTIONS 1-9: 5
10. IF YOU CHECKED OFF ANY PROBLEMS, HOW DIFFICULT HAVE THESE PROBLEMS MADE IT FOR YOU TO DO YOUR WORK, TAKE CARE OF THINGS AT HOME, OR GET ALONG WITH OTHER PEOPLE: SOMEWHAT DIFFICULT
4. FEELING TIRED OR HAVING LITTLE ENERGY: NOT AT ALL
SUM OF ALL RESPONSES TO PHQ9 QUESTIONS 1 & 2: 1
SUM OF ALL RESPONSES TO PHQ QUESTIONS 1-9: 5
8. MOVING OR SPEAKING SO SLOWLY THAT OTHER PEOPLE COULD HAVE NOTICED. OR THE OPPOSITE, BEING SO FIGETY OR RESTLESS THAT YOU HAVE BEEN MOVING AROUND A LOT MORE THAN USUAL: SEVERAL DAYS
1. LITTLE INTEREST OR PLEASURE IN DOING THINGS: NOT AT ALL

## 2024-11-25 NOTE — PATIENT INSTRUCTIONS
closely for changes in your health, and be sure to contact your doctor if you have any problems.  Where can you learn more?  Go to https://www.piSociety.net/patientEd and enter F075 to learn more about \"A Healthy Heart: Care Instructions.\"  Current as of: June 24, 2023  Content Version: 14.2  © 2024 [employer].   Care instructions adapted under license by [employer]. If you have questions about a medical condition or this instruction, always ask your healthcare professional. [employer] disclaims any warranty or liability for your use of this information.      Personalized Preventive Plan for Michael Luther - 11/25/2024  Medicare offers a range of preventive health benefits. Some of the tests and screenings are paid in full while other may be subject to a deductible, co-insurance, and/or copay.    Some of these benefits include a comprehensive review of your medical history including lifestyle, illnesses that may run in your family, and various assessments and screenings as appropriate.    After reviewing your medical record and screening and assessments performed today your provider may have ordered immunizations, labs, imaging, and/or referrals for you.  A list of these orders (if applicable) as well as your Preventive Care list are included within your After Visit Summary for your review.    Other Preventive Recommendations:    A preventive eye exam performed by an eye specialist is recommended every 1-2 years to screen for glaucoma; cataracts, macular degeneration, and other eye disorders.  A preventive dental visit is recommended every 6 months.  Try to get at least 150 minutes of exercise per week or 10,000 steps per day on a pedometer .  Order or download the FREE \"Exercise & Physical Activity: Your Everyday Guide\" from [employer]. Call 1-377.851.9659 or search [employer] online.  You need 4672-6881 mg of calcium and 7533-7122 IU of vitamin

## 2024-11-25 NOTE — PROGRESS NOTES
rales or rhonchi, normal air movement, no respiratory distress  Cardiovascular: normal rate, regular rhythm, normal S1 and S2, no murmurs, rubs, clicks, or gallops, distal pulses intact, no carotid bruits  Abdomen: soft, non-tender, non-distended, normal bowel sounds, no masses or organomegaly  Extremities: no cyanosis, clubbing or edema  Musculoskeletal: normal range of motion, no joint swelling, deformity or tenderness  Neurologic: reflexes normal and symmetric, no cranial nerve deficit, gait, coordination and speech normal          No Known Allergies  Prior to Visit Medications    Medication Sig Taking? Authorizing Provider   gabapentin (NEURONTIN) 300 MG capsule TAKE 3 CAPSULES BY MOUTH 3 TIMES DAILY FOR 90 DAYS. Yes Nando Blanco MD   rOPINIRole (REQUIP) 2 MG tablet Take 1 tablet by mouth 3 times daily Yes Nando Blanco MD   ketoconazole (NIZORAL) 2 % cream Apply topically daily. Yes Sera Cortez PA   colestipol (COLESTID) 1 g tablet Take 1 tablet by mouth twice daily. Yes Nando Blanco MD   pregabalin (LYRICA) 50 MG capsule Take 1 capsule by mouth 2 times daily for 90 days. Max Daily Amount: 100 mg Yes Last Ricci MD   ferrous sulfate (FE TABS 325) 325 (65 Fe) MG EC tablet Take 1 tablet by mouth daily (with breakfast) Yes Last Ricci MD   naproxen (NAPROSYN) 250 MG tablet Take 1 tablet by mouth 2 times daily (with meals) Yes Joanne Fitch APRN - CNP   montelukast (SINGULAIR) 10 MG tablet TAKE 1 TABLET BY MOUTH EVERY DAY Yes Nando Blanco MD   fluticasone (FLONASE) 50 MCG/ACT nasal spray SPRAY 2 SPRAYS INTO EACH NOSTRIL EVERY DAY Yes Nando Blanco MD   ONETOUCH VERIO strip TEST TWICE A DAY AND AS NEEDED FOR IRREGULAR BLOOD SUGAR SYMPTOMS Yes Nando Blanco MD   FLUoxetine (PROZAC) 40 MG capsule Take 1 capsule by mouth daily. Yes Nando Blanco MD   sildenafil (VIAGRA) 50 MG tablet TAKE 1 TABLET BY MOUTH DAILY AS NEEDED FOR ERECTILE DYSFUNCTION Yes

## 2024-11-26 LAB
ALBUMIN SERPL-MCNC: 4.6 G/DL (ref 3.4–5)
ALBUMIN/GLOB SERPL: 2.1 {RATIO} (ref 1.1–2.2)
ALP SERPL-CCNC: 134 U/L (ref 40–129)
ALT SERPL-CCNC: 54 U/L (ref 10–40)
ANION GAP SERPL CALCULATED.3IONS-SCNC: 11 MMOL/L (ref 3–16)
AST SERPL-CCNC: 33 U/L (ref 15–37)
BILIRUB SERPL-MCNC: 0.4 MG/DL (ref 0–1)
BUN SERPL-MCNC: 15 MG/DL (ref 7–20)
CALCIUM SERPL-MCNC: 10 MG/DL (ref 8.3–10.6)
CHLORIDE SERPL-SCNC: 99 MMOL/L (ref 99–110)
CHOLEST SERPL-MCNC: 169 MG/DL (ref 0–199)
CO2 SERPL-SCNC: 28 MMOL/L (ref 21–32)
CREAT SERPL-MCNC: 0.7 MG/DL (ref 0.8–1.3)
CREAT UR-MCNC: 177 MG/DL (ref 39–259)
GFR SERPLBLD CREATININE-BSD FMLA CKD-EPI: >90 ML/MIN/{1.73_M2}
GLUCOSE SERPL-MCNC: 203 MG/DL (ref 70–99)
HDLC SERPL-MCNC: 31 MG/DL (ref 40–60)
LDLC SERPL CALC-MCNC: 104 MG/DL
MICROALBUMIN UR DL<=1MG/L-MCNC: <1.2 MG/DL
MICROALBUMIN/CREAT UR: NORMAL MG/G (ref 0–30)
POTASSIUM SERPL-SCNC: 4.5 MMOL/L (ref 3.5–5.1)
PROT SERPL-MCNC: 6.8 G/DL (ref 6.4–8.2)
PSA SERPL DL<=0.01 NG/ML-MCNC: 0.58 NG/ML (ref 0–4)
SODIUM SERPL-SCNC: 138 MMOL/L (ref 136–145)
TRIGL SERPL-MCNC: 170 MG/DL (ref 0–150)
VLDLC SERPL CALC-MCNC: 34 MG/DL

## 2024-11-28 LAB
HCV AB S/CO SERPL IA: 0.09 IV
HCV AB SERPL QL IA: NEGATIVE

## 2024-12-04 ENCOUNTER — OFFICE VISIT (OUTPATIENT)
Dept: FAMILY MEDICINE CLINIC | Age: 64
End: 2024-12-04

## 2024-12-04 VITALS
HEART RATE: 88 BPM | BODY MASS INDEX: 23.98 KG/M2 | DIASTOLIC BLOOD PRESSURE: 86 MMHG | OXYGEN SATURATION: 97 % | WEIGHT: 162.4 LBS | SYSTOLIC BLOOD PRESSURE: 132 MMHG

## 2024-12-04 DIAGNOSIS — R68.89 FLU-LIKE SYMPTOMS: ICD-10-CM

## 2024-12-04 DIAGNOSIS — J20.8 ACUTE BACTERIAL BRONCHITIS: Primary | ICD-10-CM

## 2024-12-04 DIAGNOSIS — B96.89 ACUTE BACTERIAL BRONCHITIS: Primary | ICD-10-CM

## 2024-12-04 LAB
INFLUENZA A ANTIGEN, POC: NEGATIVE
INFLUENZA B ANTIGEN, POC: NEGATIVE
LOT EXPIRE DATE: NORMAL
LOT KIT NUMBER: NORMAL
RSV RNA: NORMAL
SARS-COV-2, POC: NORMAL
VALID INTERNAL CONTROL: NORMAL
VENDOR AND KIT NAME POC: NORMAL

## 2024-12-04 ASSESSMENT — ENCOUNTER SYMPTOMS
DIARRHEA: 0
COUGH: 0
CONSTIPATION: 0
SHORTNESS OF BREATH: 0

## 2024-12-04 NOTE — PROGRESS NOTES
[facility]  [address],  [place] [address]    Assessment/Plan:    Michael was seen today for sinus problem.    Diagnoses and all orders for this visit:    Acute bacterial bronchitis  -     amoxicillin-clavulanate (AUGMENTIN) 875-125 MG per tablet; Take 1 tablet by mouth 2 times daily for 7 days    Flu-like symptoms  -     POCT COVID-19 & Influenza A/B  -     POCT RSV Molecular (drops CPT 74220)    Discussed point-of-care testing for RSV, COVID and flu.  Per shared decision making we will hold off on this at this time.  Patient with prolonged symptoms (greater than 7 days) and worsening.  Will treat with antibiotics given concern for bacterial bronchitis.    Recommended point-of-care testing for RSV, COVID and flu and patient accepted.    Counseling provided for the following:  - Encouraged hydration  - Humidifier to alleviate symptoms  - Okay to use honey to alleviate cough symptoms  - Tea with lemon or ginger are very soothing for URI symptoms  - Alternate between tylenol 1000mg every six hours and ibuprofen 400mg every eight hours as needed for symptom relief  - Approximately 5 minutes of smoking cessation counseling provided today.      Patient with risk factors including emphysema, elderly.  Rescue antibiotics provided if patient's symptoms progress. Appropriate counseling for side effects of this medication provided.    ED precautions provided if shortness of breath and chest pain symptoms worsen or symptoms persist after 5-10 days    No follow-ups on file.     Patient: Michael Luther is a pleasant 64 y.o.male who presents today for:      Chief Complaint   Patient presents with    Sinus Problem       HPI:     Wife accompanies patient.     Cough:  Patient reports symptoms of productive cough.  Symptoms began approximately 1 week ago. Associated symptoms include rhinorrhea, fatigue. Denies any symptoms of fever , nausea/vomiting, chest pain, wheezing.  shortness of

## 2024-12-18 ENCOUNTER — HOSPITAL ENCOUNTER (OUTPATIENT)
Age: 64
Discharge: HOME | End: 2024-12-20
Payer: MEDICARE

## 2024-12-18 DIAGNOSIS — Z13.6 SCREENING FOR AAA (ABDOMINAL AORTIC ANEURYSM): ICD-10-CM

## 2024-12-18 LAB
VAS AORTA DIST AP: 1.6 CM
VAS AORTA DIST PSV: 61.1 CM/S
VAS AORTA DIST TR: 1.5 CM
VAS AORTA MID AP: 1.5 CM
VAS AORTA MID PSV: 67.6 CM/S
VAS AORTA MID TRANS: 1.8 CM
VAS AORTA PROX AP: 2.1 CM
VAS AORTA PROX PSV: 77.1 CM/S
VAS AORTA PROX TR: 1.9 CM
VAS LEFT COM ILIAC PROX PSV: 126 CM/S
VAS RIGHT COM ILIAC PROX PSV: 78.3 CM/S

## 2024-12-18 PROCEDURE — 76706 US ABDL AORTA SCREEN AAA: CPT

## 2025-01-13 DIAGNOSIS — K90.89 BILE SALT-INDUCED DIARRHEA: ICD-10-CM

## 2025-01-13 RX ORDER — COLESTIPOL HYDROCHLORIDE 1 G/1
1 TABLET ORAL 2 TIMES DAILY
Qty: 180 TABLET | Refills: 0 | Status: SHIPPED | OUTPATIENT
Start: 2025-01-13

## 2025-01-15 ENCOUNTER — OFFICE VISIT (OUTPATIENT)
Dept: FAMILY MEDICINE CLINIC | Age: 65
End: 2025-01-15
Payer: MEDICAID

## 2025-01-15 VITALS
BODY MASS INDEX: 23.79 KG/M2 | TEMPERATURE: 97.3 F | HEIGHT: 69 IN | SYSTOLIC BLOOD PRESSURE: 106 MMHG | OXYGEN SATURATION: 97 % | HEART RATE: 80 BPM | WEIGHT: 160.6 LBS | DIASTOLIC BLOOD PRESSURE: 60 MMHG

## 2025-01-15 DIAGNOSIS — G25.81 RESTLESS LEG SYNDROME: ICD-10-CM

## 2025-01-15 DIAGNOSIS — J44.1 COPD EXACERBATION (HCC): Primary | ICD-10-CM

## 2025-01-15 DIAGNOSIS — G25.81 RLS (RESTLESS LEGS SYNDROME): ICD-10-CM

## 2025-01-15 DIAGNOSIS — E11.9 TYPE 2 DIABETES MELLITUS WITHOUT COMPLICATION, WITHOUT LONG-TERM CURRENT USE OF INSULIN (HCC): ICD-10-CM

## 2025-01-15 LAB — HBA1C MFR BLD: 8.3 %

## 2025-01-15 PROCEDURE — 83036 HEMOGLOBIN GLYCOSYLATED A1C: CPT | Performed by: STUDENT IN AN ORGANIZED HEALTH CARE EDUCATION/TRAINING PROGRAM

## 2025-01-15 PROCEDURE — 1123F ACP DISCUSS/DSCN MKR DOCD: CPT | Performed by: STUDENT IN AN ORGANIZED HEALTH CARE EDUCATION/TRAINING PROGRAM

## 2025-01-15 PROCEDURE — 3052F HG A1C>EQUAL 8.0%<EQUAL 9.0%: CPT | Performed by: STUDENT IN AN ORGANIZED HEALTH CARE EDUCATION/TRAINING PROGRAM

## 2025-01-15 PROCEDURE — 99214 OFFICE O/P EST MOD 30 MIN: CPT | Performed by: STUDENT IN AN ORGANIZED HEALTH CARE EDUCATION/TRAINING PROGRAM

## 2025-01-15 RX ORDER — GABAPENTIN 300 MG/1
300 CAPSULE ORAL 3 TIMES DAILY
Qty: 810 CAPSULE | Refills: 0 | Status: SHIPPED | OUTPATIENT
Start: 2025-01-15 | End: 2025-10-12

## 2025-01-15 RX ORDER — PREGABALIN 50 MG/1
50 CAPSULE ORAL 2 TIMES DAILY
Qty: 60 CAPSULE | Refills: 2 | Status: SHIPPED | OUTPATIENT
Start: 2025-01-15 | End: 2025-04-15

## 2025-01-15 RX ORDER — PREDNISONE 20 MG/1
40 TABLET ORAL DAILY
Qty: 20 TABLET | Refills: 0 | Status: SHIPPED | OUTPATIENT
Start: 2025-01-15 | End: 2025-01-25

## 2025-01-15 SDOH — ECONOMIC STABILITY: FOOD INSECURITY: WITHIN THE PAST 12 MONTHS, YOU WORRIED THAT YOUR FOOD WOULD RUN OUT BEFORE YOU GOT MONEY TO BUY MORE.: NEVER TRUE

## 2025-01-15 SDOH — ECONOMIC STABILITY: FOOD INSECURITY: WITHIN THE PAST 12 MONTHS, THE FOOD YOU BOUGHT JUST DIDN'T LAST AND YOU DIDN'T HAVE MONEY TO GET MORE.: NEVER TRUE

## 2025-01-15 ASSESSMENT — PATIENT HEALTH QUESTIONNAIRE - PHQ9
7. TROUBLE CONCENTRATING ON THINGS, SUCH AS READING THE NEWSPAPER OR WATCHING TELEVISION: NOT AT ALL
SUM OF ALL RESPONSES TO PHQ9 QUESTIONS 1 & 2: 1
SUM OF ALL RESPONSES TO PHQ QUESTIONS 1-9: 5
10. IF YOU CHECKED OFF ANY PROBLEMS, HOW DIFFICULT HAVE THESE PROBLEMS MADE IT FOR YOU TO DO YOUR WORK, TAKE CARE OF THINGS AT HOME, OR GET ALONG WITH OTHER PEOPLE: VERY DIFFICULT
6. FEELING BAD ABOUT YOURSELF - OR THAT YOU ARE A FAILURE OR HAVE LET YOURSELF OR YOUR FAMILY DOWN: NOT AT ALL
5. POOR APPETITE OR OVEREATING: NEARLY EVERY DAY
8. MOVING OR SPEAKING SO SLOWLY THAT OTHER PEOPLE COULD HAVE NOTICED. OR THE OPPOSITE, BEING SO FIGETY OR RESTLESS THAT YOU HAVE BEEN MOVING AROUND A LOT MORE THAN USUAL: NOT AT ALL
2. FEELING DOWN, DEPRESSED OR HOPELESS: SEVERAL DAYS
1. LITTLE INTEREST OR PLEASURE IN DOING THINGS: NOT AT ALL
3. TROUBLE FALLING OR STAYING ASLEEP: NOT AT ALL
SUM OF ALL RESPONSES TO PHQ QUESTIONS 1-9: 5
9. THOUGHTS THAT YOU WOULD BE BETTER OFF DEAD, OR OF HURTING YOURSELF: NOT AT ALL
4. FEELING TIRED OR HAVING LITTLE ENERGY: SEVERAL DAYS
SUM OF ALL RESPONSES TO PHQ QUESTIONS 1-9: 5
SUM OF ALL RESPONSES TO PHQ QUESTIONS 1-9: 5

## 2025-01-15 ASSESSMENT — ENCOUNTER SYMPTOMS
COUGH: 0
SHORTNESS OF BREATH: 0
CONSTIPATION: 0
DIARRHEA: 0

## 2025-01-15 NOTE — PROGRESS NOTES
[facility]  [address],  [place] [address]    Assessment/Plan:    Michael was seen today for diabetes and follow-up.    Diagnoses and all orders for this visit:    COPD exacerbation (HCC)          -     predniSONE (DELTASONE) 20 MG tablet; Take 2 tablets by mouth daily for 10 days  COVID and flu tests declined  Encouraged to use albuterol  and ipratropium inhalers needed  Oral prednisone prescribed for total of 10 days  Continue with maintenance and rescue inhaler treatments.  Patient without fever or lung exam findings concerning for infection.  Will hold off on antibiotics at this time as patient recently completed antibiotics a month ago.  Return precautions provided    RLS (restless legs syndrome)  -     gabapentin (NEURONTIN) 300 MG capsule; Take 1 capsule by mouth 3 times daily for 270 days.    Restless leg syndrome  -     pregabalin (LYRICA) 50 MG capsule; Take 1 capsule by mouth 2 times daily for 90 days. Max Daily Amount: 100 mg    Type 2 diabetes mellitus without complication, without long-term current use of insulin (HCC)  -     POCT glycosylated hemoglobin (Hb A1C)  -     metFORMIN (GLUCOPHAGE) 500 MG tablet; Take 1 tablet by mouth daily (with breakfast) for 4 days, THEN 2 tablets daily (with breakfast) for 28 days.  Not at goal.  A1c 8.3 from 8.0 following attempted lifestyle modifications.  Will begin metformin and encouraged continued lifestyle modifications.  Follow-up in 3 months.    Return in about 3 months (around 4/15/2025) for DM, COPD .     Patient: Michael Luther is a pleasant 64 y.o.male who presents today for:      Chief Complaint   Patient presents with    Diabetes    Follow-up       HPI:     Accompanied to visit by his wife.    COPD exacerbation:  Currently reports increased cough frequency and severity.  Cough is productive.  Denies increase of breath at this time.    Diabetes:  Since last visit has attempted lifestyle modifications to improve

## 2025-01-20 DIAGNOSIS — J30.2 SEASONAL ALLERGIES: ICD-10-CM

## 2025-01-20 DIAGNOSIS — J44.9 CHRONIC OBSTRUCTIVE PULMONARY DISEASE, UNSPECIFIED COPD TYPE (HCC): ICD-10-CM

## 2025-01-20 DIAGNOSIS — E11.9 TYPE 2 DIABETES MELLITUS WITHOUT COMPLICATION, WITHOUT LONG-TERM CURRENT USE OF INSULIN (HCC): ICD-10-CM

## 2025-01-20 DIAGNOSIS — E11.9 TYPE 2 DIABETES MELLITUS WITHOUT COMPLICATION, WITHOUT LONG-TERM CURRENT USE OF INSULIN: ICD-10-CM

## 2025-01-20 DIAGNOSIS — J30.9 ALLERGIC RHINITIS, UNSPECIFIED SEASONALITY, UNSPECIFIED TRIGGER: ICD-10-CM

## 2025-01-20 DIAGNOSIS — F32.A DEPRESSION, UNSPECIFIED DEPRESSION TYPE: ICD-10-CM

## 2025-01-20 RX ORDER — ALBUTEROL SULFATE 90 UG/1
2 INHALANT RESPIRATORY (INHALATION) 4 TIMES DAILY PRN
Qty: 8.5 EACH | Refills: 5 | Status: SHIPPED | OUTPATIENT
Start: 2025-01-20

## 2025-01-20 RX ORDER — MONTELUKAST SODIUM 10 MG/1
10 TABLET ORAL DAILY
Qty: 90 TABLET | Refills: 1 | Status: SHIPPED | OUTPATIENT
Start: 2025-01-20

## 2025-01-20 RX ORDER — BLOOD SUGAR DIAGNOSTIC
STRIP MISCELLANEOUS
Qty: 100 STRIP | Refills: 3 | Status: SHIPPED | OUTPATIENT
Start: 2025-01-20

## 2025-01-20 RX ORDER — LANCETS 30 GAUGE
1 EACH MISCELLANEOUS 2 TIMES DAILY
Qty: 300 EACH | Refills: 1 | Status: SHIPPED | OUTPATIENT
Start: 2025-01-20

## 2025-01-20 RX ORDER — BLOOD-GLUCOSE METER
1 KIT MISCELLANEOUS DAILY
Qty: 1 KIT | Refills: 0 | Status: SHIPPED | OUTPATIENT
Start: 2025-01-20

## 2025-01-20 RX ORDER — FLUTICASONE PROPIONATE 50 MCG
2 SPRAY, SUSPENSION (ML) NASAL DAILY
Qty: 16 G | Refills: 1 | Status: SHIPPED | OUTPATIENT
Start: 2025-01-20

## 2025-01-20 NOTE — TELEPHONE ENCOUNTER
Last ov 01/15/2025   Future Appointments   Date Time Provider Department Center   1/29/2025  9:45 AM Last Ricci MD [facility] Neurology -

## 2025-01-20 NOTE — TELEPHONE ENCOUNTER
Last ov 01/15/025   Future Appointments   Date Time Provider Department Center   1/29/2025  9:45 AM Lats Ricci MD [facility] Neurology -

## 2025-01-20 NOTE — TELEPHONE ENCOUNTER
Pt requesting refill    glucose monitoring kit     ONETOUCH VERIO strip     Lancets MISC     [facility]

## 2025-01-23 ENCOUNTER — TELEPHONE (OUTPATIENT)
Dept: FAMILY MEDICINE CLINIC | Age: 65
End: 2025-01-23

## 2025-01-23 DIAGNOSIS — Z72.0 TOBACCO USE: Primary | ICD-10-CM

## 2025-01-23 NOTE — TELEPHONE ENCOUNTER
Pt called he received a letter stating that he needs an order for his CT lung screening test. Please call to advise

## 2025-01-24 ENCOUNTER — OFFICE VISIT (OUTPATIENT)
Dept: FAMILY MEDICINE CLINIC | Age: 65
End: 2025-01-24

## 2025-01-24 VITALS
DIASTOLIC BLOOD PRESSURE: 64 MMHG | WEIGHT: 158.2 LBS | OXYGEN SATURATION: 96 % | BODY MASS INDEX: 23.43 KG/M2 | TEMPERATURE: 97.3 F | HEART RATE: 84 BPM | SYSTOLIC BLOOD PRESSURE: 116 MMHG | HEIGHT: 69 IN

## 2025-01-24 DIAGNOSIS — J44.9 CHRONIC OBSTRUCTIVE PULMONARY DISEASE, UNSPECIFIED COPD TYPE (HCC): ICD-10-CM

## 2025-01-24 DIAGNOSIS — Z87.891 PERSONAL HISTORY OF TOBACCO USE: ICD-10-CM

## 2025-01-24 DIAGNOSIS — G25.81 RESTLESS LEG SYNDROME: ICD-10-CM

## 2025-01-24 DIAGNOSIS — R05.1 ACUTE COUGH: Primary | ICD-10-CM

## 2025-01-24 RX ORDER — DOXYCYCLINE HYCLATE 100 MG
100 TABLET ORAL 2 TIMES DAILY
Qty: 20 TABLET | Refills: 0 | Status: SHIPPED | OUTPATIENT
Start: 2025-01-24 | End: 2025-01-24

## 2025-01-24 RX ORDER — DOXYCYCLINE HYCLATE 100 MG
100 TABLET ORAL 2 TIMES DAILY
Qty: 20 TABLET | Refills: 0 | Status: SHIPPED | OUTPATIENT
Start: 2025-01-24 | End: 2025-01-31

## 2025-01-24 RX ORDER — PREGABALIN 50 MG/1
50 CAPSULE ORAL 2 TIMES DAILY
Qty: 60 CAPSULE | Refills: 2 | Status: SHIPPED | OUTPATIENT
Start: 2025-01-24 | End: 2025-04-24

## 2025-01-24 ASSESSMENT — ENCOUNTER SYMPTOMS
SHORTNESS OF BREATH: 0
CONSTIPATION: 0
DIARRHEA: 0
COUGH: 1

## 2025-01-24 NOTE — PROGRESS NOTES
[facility]  [address],  [place] [address]    Assessment/Plan:    Michael was seen today for cough.    Diagnoses and all orders for this visit:    Personal history of tobacco use  -     [place] VISIT TO DISCUSS LUNG CA SCREEN W LDCT  -     CT Lung Screen (Initial/Annual/Baseline); Future  -     [facility]  -     Spirometry With OR Without Bronchodilator    Chronic obstructive pulmonary disease, unspecified COPD type (HCC)  -     Humberto Carvajal MD, Pulmonary, [facility]  -     Spirometry With OR Without Bronchodilator  -     umeclidinium-vilanterol (ANORO ELLIPTA) 62.5-25 MCG/ACT inhaler; Inhale 1 puff into the lungs daily  -     doxycycline hyclate (VIBRA-TABS) 100 MG tablet; Take 1 tablet by mouth 2 times daily for 7 days  Not at goal.  Possible underlying bacterial infection.  Previously treated with Augmentin will treat with doxycycline at this time.  Will begin patient on LABA LAMA.  Lung cancer screening ordered.  Spirometry ordered.  Referral placed to pulmonology.  Follow-up in 1 month.    Acute cough  -     Cancel: POCT Respiratory Syncytial Virus  -     Cancel: POCT COVID-19 & Influenza A/B    Restless leg syndrome  -     pregabalin (LYRICA) 50 MG capsule; Take 1 capsule by mouth 2 times daily for 90 days. Max Daily Amount: 100 mg    Other orders  -     Discontinue: doxycycline hyclate (VIBRA-TABS) 100 MG tablet; Take 1 tablet by mouth 2 times daily for 10 days      Return for 3 weeks COPD management. EOV.     Patient: Michael Luther is a pleasant 64 y.o.male who presents today for:      Chief Complaint   Patient presents with    Cough     Chest Congest. Mucus is green  x 1 month       HPI:     Cough:  Patient presents with ongoing productive cough for almost 2 months.  Has been treated with oral steroids and antibiotics (Augmentin) in the past without much improvement.  Associated symptoms include

## 2025-01-24 NOTE — PATIENT INSTRUCTIONS
Please complete antibiotic for 7 days (doxycycline)  Begin new inhalor (Annoro Ellipta) everday  CT scan for lung screening  Breathing test to see how strong our lungs are  Schedule appointment with Dr. Khan (pulmonologist)     Learning About Lung Cancer Screening  What is screening for lung cancer?     Lung cancer screening is a way to find some lung cancers early, before a person has any symptoms of the cancer.  Lung cancer screening may help those who have the highest risk for lung cancer--people age 50 and older who are or were heavy smokers. For most people, who aren't at increased risk, screening for lung cancer probably isn't helpful.  Screening won't prevent cancer. And it may not find all lung cancers. Lung cancer screening may lower the risk of dying from lung cancer in a small number of people.  How is it done?  Lung cancer screening is done with a low-dose CT (computed tomography) scan. A CT scan uses X-rays, or radiation, to make detailed pictures of your body. Experts recommend that screening be done in medical centers that focus on finding and treating lung cancer.  Who is screening recommended for?  Lung cancer screening is recommended for people age 50 and older who are or were heavy smokers. That means people with a smoking history of at least 20 pack years. A pack year is a way to measure how heavy a smoker you are or were.  To figure out your pack years, multiply how many packs a day on average (assuming 20 cigarettes per pack) you have smoked by how many years you have smoked. For example:  If you smoked 1 pack a day for 20 years, that's 1 times 20. So you have a smoking history of 20 pack years.  If you smoked 2 packs a day for 10 years, that's 2 times 10. So you have a smoking history of 20 pack years.  Experts agree that screening is for people who have a high risk of lung cancer. But experts don't agree on what high risk means. Some say people age 50 or older with at least a 20-pack-year

## 2025-01-27 ENCOUNTER — HOSPITAL ENCOUNTER (EMERGENCY)
Age: 65
Discharge: HOME | End: 2025-01-27
Attending: EMERGENCY MEDICINE
Payer: MEDICAID

## 2025-01-27 VITALS
HEART RATE: 83 BPM | OXYGEN SATURATION: 97 % | HEIGHT: 70 IN | WEIGHT: 160 LBS | TEMPERATURE: 97.9 F | RESPIRATION RATE: 19 BRPM | BODY MASS INDEX: 22.9 KG/M2 | SYSTOLIC BLOOD PRESSURE: 134 MMHG | DIASTOLIC BLOOD PRESSURE: 91 MMHG

## 2025-01-27 DIAGNOSIS — G25.81 RLS (RESTLESS LEGS SYNDROME): ICD-10-CM

## 2025-01-27 DIAGNOSIS — G25.81 RESTLESS LEGS SYNDROME (RLS): Primary | ICD-10-CM

## 2025-01-27 PROCEDURE — 6370000000 HC RX 637 (ALT 250 FOR IP): Performed by: EMERGENCY MEDICINE

## 2025-01-27 PROCEDURE — 99283 EMERGENCY DEPT VISIT LOW MDM: CPT

## 2025-01-27 RX ORDER — ROPINIROLE 1 MG/1
4 TABLET, FILM COATED ORAL ONCE
Status: COMPLETED | OUTPATIENT
Start: 2025-01-27 | End: 2025-01-27

## 2025-01-27 RX ORDER — ROPINIROLE 1 MG/1
2 TABLET, FILM COATED ORAL ONCE
Status: DISCONTINUED | OUTPATIENT
Start: 2025-01-27 | End: 2025-01-27

## 2025-01-27 RX ORDER — ROPINIROLE 2 MG/1
2 TABLET, FILM COATED ORAL 3 TIMES DAILY
Qty: 270 TABLET | Refills: 0 | Status: SHIPPED | OUTPATIENT
Start: 2025-01-27

## 2025-01-27 RX ADMIN — ROPINIROLE HYDROCHLORIDE 4 MG: 1 TABLET, FILM COATED ORAL at 05:24

## 2025-01-27 NOTE — ED PROVIDER NOTES
[facility] EMERGENCY DEPARTMENT  EMERGENCY DEPARTMENT ENCOUNTER        Patient Name: Michael Luther  MRN: [id number]  Birthdate 1960  Date of evaluation: 1/27/2025  Provider: Ezio Alvarez MD  PCP: Nando Blanco MD  Note Started: 5:13 AM EST 1/27/25    CHIEF COMPLAINT       OTHER (Pt reports his legs won't stop \"jumping\".pt has been taking gabapentin and requip for 5 years but he said its not helping with the symptoms.)      HISTORY OF PRESENT ILLNESS: 1 or more Elements     History from : Patient    Limitations to history : None    Michael Luther is a 64 y.o. male who presents for evaluation of leg pain.  Patient states that he is having issues with his legs \"jumping\".  He states that he takes gabapentin as well as Requip however it is not helping with the symptoms.  He states that he has been eval by multiple specialist who have not been able to help with control of his symptoms he has been out of the Requip for the past day    Nursing Notes were all reviewed and agreed with or any disagreements were addressed in the HPI.    REVIEW OF SYSTEMS :      Review of Systems    Positives and Pertinent negatives as per HPI.     SURGICAL HISTORY     Past Surgical History:   Procedure Laterality Date    APPENDECTOMY      CHOLECYSTECTOMY, LAPAROSCOPIC N/A 3/19/2019    LAPAROSCOPIC CHOLECYSTECTOMY WITH INTRAOPERATIVE, CHOLANGIOGRAM performed by Monroe Odell MD at [facility] OR    COLONOSCOPY         CURRENTMEDICATIONS       Discharge Medication List as of 1/27/2025  5:24 AM        CONTINUE these medications which have NOT CHANGED    Details   NONFORMULARY Take 65 mg by mouth DailyHistorical Med      pregabalin (LYRICA) 50 MG capsule Take 1 capsule by mouth 2 times daily for 90 days. Max Daily Amount: 100 mg, Disp-60 capsule, R-2Normal      umeclidinium-vilanterol (ANORO ELLIPTA) 62.5-25 MCG/ACT inhaler Inhale 1 puff into the lungs daily, Disp-14 each, R-0Normal      doxycycline hyclate (VIBRA-TABS) 100

## 2025-01-28 ENCOUNTER — TELEPHONE (OUTPATIENT)
Age: 65
End: 2025-01-28

## 2025-01-28 NOTE — TELEPHONE ENCOUNTER
Pt cancelled 3 mo fu RLS with Dr. Prasad on 1/29/25. Pt stated he would cb to reschedule   Full Thickness Lip Wedge Repair (Flap) Text: Given the location of the defect and the proximity to free margins a full thickness wedge repair was deemed most appropriate.  Using a sterile surgical marker, the appropriate repair was drawn incorporating the defect and placing the expected incisions perpendicular to the vermilion border.  The vermilion border was also meticulously outlined to ensure appropriate reapproximation during the repair.  The area thus outlined was incised through and through with a #15 scalpel blade.  The muscularis and dermis were reaproximated with deep sutures following hemostasis. Care was taken to realign the vermilion border before proceeding with the superficial closure.  Once the vermilion was realigned the superfical and mucosal closure was finished.

## 2025-02-10 ENCOUNTER — TELEPHONE (OUTPATIENT)
Dept: FAMILY MEDICINE CLINIC | Age: 65
End: 2025-02-10

## 2025-02-10 DIAGNOSIS — G25.81 RLS (RESTLESS LEGS SYNDROME): ICD-10-CM

## 2025-02-10 NOTE — TELEPHONE ENCOUNTER
Please send over to Dr. Prasad office for review.  Patient follows with neurology for restless leg syndrome management.  Thanks!

## 2025-02-11 ENCOUNTER — TELEPHONE (OUTPATIENT)
Dept: FAMILY MEDICINE CLINIC | Age: 65
End: 2025-02-11

## 2025-02-11 DIAGNOSIS — J30.2 SEASONAL ALLERGIES: ICD-10-CM

## 2025-02-11 RX ORDER — FLUTICASONE PROPIONATE 50 MCG
2 SPRAY, SUSPENSION (ML) NASAL DAILY
Qty: 16 G | Refills: 1 | Status: SHIPPED | OUTPATIENT
Start: 2025-02-11 | End: 2025-02-14 | Stop reason: SDUPTHER

## 2025-02-11 RX ORDER — GABAPENTIN 300 MG/1
300 CAPSULE ORAL 3 TIMES DAILY
Qty: 810 CAPSULE | Refills: 0 | Status: SHIPPED | OUTPATIENT
Start: 2025-02-11 | End: 2025-11-08

## 2025-02-11 NOTE — TELEPHONE ENCOUNTER
Last Office Visit  -  1/24/25  Next Office Visit  -  2/14/25    Last Filled  -  1/20/25  Last UDS -    Contract -

## 2025-02-11 NOTE — TELEPHONE ENCOUNTER
[facility] on  fluticasone (FLONASE) 50 MCG/ACT nasal spray   Last visit 1/24/25  Future 2/14/25  [facility]

## 2025-02-11 NOTE — TELEPHONE ENCOUNTER
Patient taking gabapentin with Lyrica.  Confirmed with patient that he is taking 800 mg 3 times daily.  Called pharmacy and prescription resent. Thanks!

## 2025-02-13 ENCOUNTER — TELEPHONE (OUTPATIENT)
Dept: FAMILY MEDICINE CLINIC | Age: 65
End: 2025-02-13

## 2025-02-13 DIAGNOSIS — J30.2 SEASONAL ALLERGIES: ICD-10-CM

## 2025-02-14 ENCOUNTER — OFFICE VISIT (OUTPATIENT)
Dept: FAMILY MEDICINE CLINIC | Age: 65
End: 2025-02-14

## 2025-02-14 VITALS
WEIGHT: 159.4 LBS | BODY MASS INDEX: 22.82 KG/M2 | DIASTOLIC BLOOD PRESSURE: 60 MMHG | SYSTOLIC BLOOD PRESSURE: 100 MMHG | OXYGEN SATURATION: 95 % | HEIGHT: 70 IN | HEART RATE: 79 BPM

## 2025-02-14 DIAGNOSIS — G25.81 RLS (RESTLESS LEGS SYNDROME): Primary | ICD-10-CM

## 2025-02-14 DIAGNOSIS — J44.9 CHRONIC OBSTRUCTIVE PULMONARY DISEASE, UNSPECIFIED COPD TYPE (HCC): ICD-10-CM

## 2025-02-14 RX ORDER — FLUTICASONE PROPIONATE 50 MCG
2 SPRAY, SUSPENSION (ML) NASAL DAILY
Qty: 1 EACH | Refills: 3 | Status: SHIPPED | OUTPATIENT
Start: 2025-02-14 | End: 2025-05-15

## 2025-02-14 ASSESSMENT — ENCOUNTER SYMPTOMS
DIARRHEA: 0
CONSTIPATION: 0
COUGH: 1
SHORTNESS OF BREATH: 0

## 2025-02-14 NOTE — PROGRESS NOTES
[facility]  [address],  [place] [address]    Assessment/Plan:    Michael was seen today for copd.    Diagnoses and all orders for this visit:    RLS (restless legs syndrome)  -     [facility] Last Ricci MD, Neurology, [facility]  Not at goal.  Referral placed to neurology.  Continue gabapentin and Lyrica as per PCP recommendations.    Chronic obstructive pulmonary disease, unspecified COPD type (HCC)  -     tiotropium-olodaterol (STIOLTO RESPIMAT) 2.5-2.5 MCG/ACT AERS; Inhale 2 puffs into the lungs daily  Not at goal.  Will switch LAMA LABA to Stiolto as per above.  Continue follow-up with pulmonology as scheduled.  No evidence of COPD exacerbation at this time    Return in about 1 month (around 3/14/2025) for COPD med check.     Patient: Michael Luther is a pleasant 64 y.o.male who presents today for:      Chief Complaint   Patient presents with    COPD       HPI:     COPD:  Since last visit began on neuroleptic medication however states that because mouth rotations therefore discontinued.  Has upcoming appointment with pulmonology in 2 months and has also made appointments for PFTs and CT lung cancer screening scan.  Denies any new symptoms however ongoing cough.  Denies any increase in dyspnea or sputum production.    Restless leg syndrome:  Also reports ongoing restless leg symptoms despite being on gabapentin 800 3 times daily and Lyrica.  Has not made appointment with neurology as time.    Patient's pertinent past medical history and medications were reviewed and updated as appropriate today.    Review of Systems:  Review of Systems   Constitutional:  Negative for fatigue and fever.   HENT:  Negative for congestion.    Respiratory:  Positive for cough. Negative for shortness of breath.    Cardiovascular:  Negative for leg swelling.   Gastrointestinal:  Negative for constipation and diarrhea.   Genitourinary:  Negative for dysuria.   Neurological:

## 2025-02-18 ENCOUNTER — HOSPITAL ENCOUNTER (OUTPATIENT)
Dept: CT IMAGING | Age: 65
Discharge: HOME | End: 2025-02-18
Attending: STUDENT IN AN ORGANIZED HEALTH CARE EDUCATION/TRAINING PROGRAM
Payer: MEDICARE

## 2025-02-18 ENCOUNTER — HOSPITAL ENCOUNTER (OUTPATIENT)
Dept: PULMONOLOGY | Age: 65
Discharge: HOME | End: 2025-02-18
Attending: STUDENT IN AN ORGANIZED HEALTH CARE EDUCATION/TRAINING PROGRAM
Payer: MEDICARE

## 2025-02-18 VITALS — HEART RATE: 79 BPM | OXYGEN SATURATION: 97 %

## 2025-02-18 DIAGNOSIS — Z87.891 PERSONAL HISTORY OF TOBACCO USE: ICD-10-CM

## 2025-02-18 LAB
EXPIRATORY TIME-POST: NORMAL
EXPIRATORY TIME: NORMAL
FEF 25-75 %CHNG: NORMAL
FEF 25-75 POST %PRED: NORMAL
FEF 25-75% %PRED-PRE: NORMAL
FEF 25-75% PRED: NORMAL
FEF 25-75-POST: NORMAL
FEF 25-75-PRE: NORMAL
FEV1 %PRED-POST: NORMAL
FEV1 %PRED-PRE: 85 %
FEV1 PRED: NORMAL
FEV1-POST: NORMAL
FEV1-PRE: NORMAL
FEV1/FVC %PRED-POST: NORMAL
FEV1/FVC %PRED-PRE: NORMAL
FEV1/FVC PRED: NORMAL
FEV1/FVC-POST: NORMAL
FEV1/FVC-PRE: NORMAL
FVC %PRED-POST: NORMAL
FVC %PRED-PRE: NORMAL
FVC PRED: NORMAL
FVC-POST: NORMAL
FVC-PRE: NORMAL
PEF %PRED-POST: NORMAL
PEF %PRED-PRE: NORMAL
PEF PRED: NORMAL
PEF%CHNG: NORMAL
PEF-POST: NORMAL
PEF-PRE: NORMAL

## 2025-02-18 PROCEDURE — 71271 CT THORAX LUNG CANCER SCR C-: CPT

## 2025-02-18 PROCEDURE — 94760 N-INVAS EAR/PLS OXIMETRY 1: CPT

## 2025-02-18 PROCEDURE — 94010 BREATHING CAPACITY TEST: CPT

## 2025-02-18 RX ORDER — ALBUTEROL SULFATE 90 UG/1
4 INHALANT RESPIRATORY (INHALATION) ONCE
Status: CANCELLED | OUTPATIENT
Start: 2025-02-18

## 2025-02-18 ASSESSMENT — PULMONARY FUNCTION TESTS: FEV1_PERCENT_PREDICTED_PRE: 85

## 2025-03-20 ENCOUNTER — TELEPHONE (OUTPATIENT)
Dept: FAMILY MEDICINE CLINIC | Age: 65
End: 2025-03-20

## 2025-03-20 NOTE — TELEPHONE ENCOUNTER
Patient is requesting a letter for housing. He stated that he needs something from the Dr that will allow him and family to be placed theodore 2 bedroom apartment.    [phone] ([place])

## 2025-03-21 NOTE — TELEPHONE ENCOUNTER
Patient is stating that his RLS keeps his wife up at night and they are in need of separate rooms.

## 2025-04-03 ENCOUNTER — OFFICE VISIT (OUTPATIENT)
Dept: FAMILY MEDICINE CLINIC | Age: 65
End: 2025-04-03

## 2025-04-03 VITALS
OXYGEN SATURATION: 98 % | SYSTOLIC BLOOD PRESSURE: 122 MMHG | HEART RATE: 85 BPM | WEIGHT: 160 LBS | DIASTOLIC BLOOD PRESSURE: 58 MMHG | BODY MASS INDEX: 22.9 KG/M2 | HEIGHT: 70 IN

## 2025-04-03 DIAGNOSIS — I48.91 ATRIAL FIBRILLATION, UNSPECIFIED TYPE (HCC): ICD-10-CM

## 2025-04-03 DIAGNOSIS — I73.9 CLAUDICATION: Primary | ICD-10-CM

## 2025-04-03 DIAGNOSIS — J44.9 CHRONIC OBSTRUCTIVE PULMONARY DISEASE, UNSPECIFIED COPD TYPE (HCC): ICD-10-CM

## 2025-04-07 ENCOUNTER — HOSPITAL ENCOUNTER (EMERGENCY)
Age: 65
Discharge: HOME | End: 2025-04-07
Payer: MEDICARE

## 2025-04-07 VITALS
RESPIRATION RATE: 18 BRPM | OXYGEN SATURATION: 98 % | TEMPERATURE: 97.5 F | HEART RATE: 96 BPM | DIASTOLIC BLOOD PRESSURE: 83 MMHG | SYSTOLIC BLOOD PRESSURE: 132 MMHG

## 2025-04-07 DIAGNOSIS — G25.81 RESTLESS LEG SYNDROME: ICD-10-CM

## 2025-04-07 DIAGNOSIS — Z76.0 ENCOUNTER FOR MEDICATION REFILL: Primary | ICD-10-CM

## 2025-04-07 PROCEDURE — 99283 EMERGENCY DEPT VISIT LOW MDM: CPT

## 2025-04-07 RX ORDER — ROPINIROLE 2 MG/1
2 TABLET, FILM COATED ORAL 3 TIMES DAILY
Qty: 21 TABLET | Refills: 0 | Status: SHIPPED | OUTPATIENT
Start: 2025-04-07 | End: 2025-04-14

## 2025-04-07 RX ORDER — ROPINIROLE 2 MG/1
2 TABLET, FILM COATED ORAL 3 TIMES DAILY
Qty: 90 TABLET | Refills: 3 | Status: SHIPPED | OUTPATIENT
Start: 2025-04-07 | End: 2025-04-08 | Stop reason: SDUPTHER

## 2025-04-07 ASSESSMENT — PAIN DESCRIPTION - LOCATION: LOCATION: LEG

## 2025-04-07 ASSESSMENT — PAIN DESCRIPTION - ORIENTATION: ORIENTATION: RIGHT;LEFT

## 2025-04-07 ASSESSMENT — PAIN SCALES - GENERAL: PAINLEVEL_OUTOF10: 7

## 2025-04-07 NOTE — TELEPHONE ENCOUNTER
Pt requesting refill. Pt stated that the [facility] told him he is too early for a refill and they can't fill til 04.23.25. Pt stated he is out of this med.    rOPINIRole (REQUIP) 2 MG tablet     [facility]

## 2025-04-07 NOTE — ED PROVIDER NOTES
ferrous sulfate (FE TABS 325) 325 (65 Fe) MG EC tablet Take 1 tablet by mouth daily (with breakfast), Disp-90 tablet, R-1Normal      naproxen (NAPROSYN) 250 MG tablet Take 1 tablet by mouth 2 times daily (with meals), Disp-20 tablet, R-1Normal      !! FLUoxetine (PROZAC) 40 MG capsule Take 1 capsule by mouth daily., Disp-90 capsule, R-3Normal      sildenafil (VIAGRA) 50 MG tablet TAKE 1 TABLET BY MOUTH DAILY AS NEEDED FOR ERECTILE DYSFUNCTION, Disp-20 tablet, R-0Normal      Alcohol Swabs (ALCOHOL PADS) 70 % PADS 2 times daily Starting Mon 1/15/2024, Disp-100 each, R-1, Normal      Multiple Vitamins-Minerals (THERAPEUTIC MULTIVITAMIN-MINERALS) tablet Take 1 tablet by mouth dailyHistorical Med       !! - Potential duplicate medications found. Please discuss with provider.          ALLERGIES     Patient has no known allergies.    FAMILYHISTORY       Family History   Problem Relation Age of Onset    Pneumonia Mother     COPD Mother     Cancer Father         lung    Mult Sclerosis Maternal Aunt         SOCIAL HISTORY       Social History     Tobacco Use    Smoking status: Every Day     Current packs/day: 2.00     Average packs/day: 2.0 packs/day for 46.0 years (92.0 ttl pk-yrs)     Types: Cigarettes     Passive exposure: Current    Smokeless tobacco: Never   Vaping Use    Vaping status: Never Used   Substance Use Topics    Alcohol use: No    Drug use: No       SCREENINGS          Maranda Coma Scale  Eye Opening: Spontaneous  Best Verbal Response: Oriented  Best Motor Response: Obeys commands  Maranda Coma Scale Score: 15              CIWA Assessment  BP: 132/83  Pulse: 96             PHYSICAL EXAM  1 or more Elements     ED Triage Vitals [04/07/25 1718]   BP Systolic BP Percentile Diastolic BP Percentile Temp Temp Source Pulse Respirations SpO2   132/83 -- -- 97.5 °F (36.4 °C) Oral 96 18 98 %      Height Weight         -- --             Physical Exam  Vitals reviewed.   Constitutional:       General: He is not in acute

## 2025-04-07 NOTE — DISCHARGE INSTRUCTIONS
Your prescription should have lasted until the end of April and since your PCP instructed you to take 2 pills 3 times a day instead of 1, you need to discuss this with your PCP to make sure your prescription lasts. I sent a prescription for your Requip to be taken 1 time, 3 times a day, for 10 days until you can get in to be seen by your PCP. Please come back to the ED if you have any new or worsening symptoms.

## 2025-04-08 DIAGNOSIS — F32.A DEPRESSION, UNSPECIFIED DEPRESSION TYPE: ICD-10-CM

## 2025-04-08 DIAGNOSIS — J30.2 SEASONAL ALLERGIES: ICD-10-CM

## 2025-04-08 DIAGNOSIS — E11.9 TYPE 2 DIABETES MELLITUS WITHOUT COMPLICATION, WITHOUT LONG-TERM CURRENT USE OF INSULIN: ICD-10-CM

## 2025-04-08 DIAGNOSIS — J44.9 CHRONIC OBSTRUCTIVE PULMONARY DISEASE, UNSPECIFIED COPD TYPE (HCC): ICD-10-CM

## 2025-04-08 RX ORDER — ROPINIROLE 2 MG/1
4 TABLET, FILM COATED ORAL 3 TIMES DAILY
Qty: 180 TABLET | Refills: 3 | Status: SHIPPED | OUTPATIENT
Start: 2025-04-08 | End: 2025-05-08

## 2025-04-08 NOTE — TELEPHONE ENCOUNTER
Pt called asking about his refill, he went to ED yesterday got a 7 day supply pt did not  at [facility]. Pt states that he has been taking TID  daily and needed a new script so they can fill. I called [facility] the insurance will not pay for early fill of the same dose.

## 2025-04-08 NOTE — TELEPHONE ENCOUNTER
Patient is requesting a new rx for 2 pills tid. Dr Santiago upped the dosage and the rx does not reflect that. Could he please send a corrected rx into the pharmacy pt is out of meds.    [phone] (home) [phone] (work)

## 2025-04-08 NOTE — TELEPHONE ENCOUNTER
Last Office Visit  -  4/3/25  Next Office Visit  -  4/10/25    Last Filled  -    Last UDS -    Contract -

## 2025-04-09 RX ORDER — FLUTICASONE PROPIONATE 50 MCG
2 SPRAY, SUSPENSION (ML) NASAL DAILY
Qty: 48 ML | Refills: 1 | Status: SHIPPED | OUTPATIENT
Start: 2025-04-09

## 2025-04-09 RX ORDER — TIOTROPIUM BROMIDE AND OLODATEROL 3.124; 2.736 UG/1; UG/1
SPRAY, METERED RESPIRATORY (INHALATION)
Qty: 1 EACH | Refills: 5 | Status: SHIPPED | OUTPATIENT
Start: 2025-04-09

## 2025-04-09 RX ORDER — FLUOXETINE HYDROCHLORIDE 40 MG/1
CAPSULE ORAL DAILY
Qty: 90 CAPSULE | Refills: 3 | Status: SHIPPED | OUTPATIENT
Start: 2025-04-09

## 2025-04-09 RX ORDER — UMECLIDINIUM BROMIDE AND VILANTEROL TRIFENATATE 62.5; 25 UG/1; UG/1
POWDER RESPIRATORY (INHALATION)
Qty: 60 EACH | Refills: 0 | Status: SHIPPED | OUTPATIENT
Start: 2025-04-09

## 2025-04-13 DIAGNOSIS — K90.89 BILE SALT-INDUCED DIARRHEA: ICD-10-CM

## 2025-04-14 RX ORDER — COLESTIPOL HYDROCHLORIDE 1 G/1
1 TABLET ORAL 2 TIMES DAILY
Qty: 180 TABLET | Refills: 0 | Status: SHIPPED | OUTPATIENT
Start: 2025-04-14

## 2025-04-21 ENCOUNTER — TELEPHONE (OUTPATIENT)
Dept: PULMONOLOGY | Age: 65
End: 2025-04-21

## 2025-04-21 NOTE — TELEPHONE ENCOUNTER
Called patient to see about reschedule appt that was no show on 4.17 vm was full unable to leave message.

## 2025-04-23 ENCOUNTER — OFFICE VISIT (OUTPATIENT)
Dept: FAMILY MEDICINE CLINIC | Age: 65
End: 2025-04-23

## 2025-04-23 VITALS
SYSTOLIC BLOOD PRESSURE: 126 MMHG | HEIGHT: 70 IN | DIASTOLIC BLOOD PRESSURE: 70 MMHG | OXYGEN SATURATION: 98 % | WEIGHT: 157.8 LBS | BODY MASS INDEX: 22.59 KG/M2 | HEART RATE: 99 BPM

## 2025-04-23 DIAGNOSIS — J44.9 CHRONIC OBSTRUCTIVE PULMONARY DISEASE, UNSPECIFIED COPD TYPE (HCC): ICD-10-CM

## 2025-04-23 DIAGNOSIS — G62.9 NEUROPATHY: ICD-10-CM

## 2025-04-23 DIAGNOSIS — G25.81 RESTLESS LEG SYNDROME: ICD-10-CM

## 2025-04-23 DIAGNOSIS — E11.9 TYPE 2 DIABETES MELLITUS WITHOUT COMPLICATION, WITHOUT LONG-TERM CURRENT USE OF INSULIN (HCC): Primary | ICD-10-CM

## 2025-04-23 LAB — HBA1C MFR BLD: 8.4 %

## 2025-04-23 RX ORDER — PREGABALIN 100 MG/1
100 CAPSULE ORAL 3 TIMES DAILY
Qty: 270 CAPSULE | Refills: 0 | Status: SHIPPED | OUTPATIENT
Start: 2025-04-23 | End: 2025-07-22

## 2025-05-19 ENCOUNTER — TELEMEDICINE (OUTPATIENT)
Dept: FAMILY MEDICINE CLINIC | Age: 65
End: 2025-05-19
Payer: MEDICARE

## 2025-05-19 VITALS
WEIGHT: 158.2 LBS | SYSTOLIC BLOOD PRESSURE: 98 MMHG | HEART RATE: 84 BPM | TEMPERATURE: 98.3 F | HEIGHT: 70 IN | BODY MASS INDEX: 22.65 KG/M2 | OXYGEN SATURATION: 98 % | DIASTOLIC BLOOD PRESSURE: 62 MMHG

## 2025-05-19 DIAGNOSIS — R06.2 WHEEZING: ICD-10-CM

## 2025-05-19 DIAGNOSIS — J22 LOWER RESPIRATORY INFECTION: Primary | ICD-10-CM

## 2025-05-19 PROCEDURE — 4004F PT TOBACCO SCREEN RCVD TLK: CPT | Performed by: NURSE PRACTITIONER

## 2025-05-19 PROCEDURE — G8420 CALC BMI NORM PARAMETERS: HCPCS | Performed by: NURSE PRACTITIONER

## 2025-05-19 PROCEDURE — 1123F ACP DISCUSS/DSCN MKR DOCD: CPT | Performed by: NURSE PRACTITIONER

## 2025-05-19 PROCEDURE — 1159F MED LIST DOCD IN RCRD: CPT | Performed by: NURSE PRACTITIONER

## 2025-05-19 PROCEDURE — G8427 DOCREV CUR MEDS BY ELIG CLIN: HCPCS | Performed by: NURSE PRACTITIONER

## 2025-05-19 PROCEDURE — 99213 OFFICE O/P EST LOW 20 MIN: CPT | Performed by: NURSE PRACTITIONER

## 2025-05-19 PROCEDURE — 3017F COLORECTAL CA SCREEN DOC REV: CPT | Performed by: NURSE PRACTITIONER

## 2025-05-19 PROCEDURE — 1160F RVW MEDS BY RX/DR IN RCRD: CPT | Performed by: NURSE PRACTITIONER

## 2025-05-19 RX ORDER — METHYLPREDNISOLONE 4 MG/1
TABLET ORAL
Qty: 21 TABLET | Refills: 0 | Status: SHIPPED | OUTPATIENT
Start: 2025-05-19

## 2025-05-19 ASSESSMENT — ENCOUNTER SYMPTOMS
SHORTNESS OF BREATH: 1
WHEEZING: 1
SORE THROAT: 1
COUGH: 1

## 2025-05-19 NOTE — PROGRESS NOTES
Tympanic membrane and external ear normal.      Nose: Nose normal.      Mouth/Throat:      Pharynx: No oropharyngeal exudate or posterior oropharyngeal erythema.   Eyes:      Conjunctiva/sclera: Conjunctivae normal.   Cardiovascular:      Rate and Rhythm: Normal rate and regular rhythm.      Heart sounds: Normal heart sounds. No murmur heard.  Pulmonary:      Effort: Pulmonary effort is normal. No respiratory distress.      Breath sounds: Normal breath sounds. No wheezing or rales.   Abdominal:      Palpations: Abdomen is soft.   Musculoskeletal:         General: Normal range of motion.      Cervical back: Normal range of motion.   Skin:     General: Skin is warm and dry.   Neurological:      General: No focal deficit present.      Mental Status: He is alert and oriented to person, place, and time.      Deep Tendon Reflexes: Reflexes are normal and symmetric.   Psychiatric:         Mood and Affect: Mood normal.         Behavior: Behavior normal.         Thought Content: Thought content normal.         Judgment: Judgment normal.       Vitals:    05/19/25 1646   BP: 98/62   Pulse: 84   Temp: 98.3 °F (36.8 °C)   SpO2: 98%       Assessment:  Encounter Diagnoses   Name Primary?    Lower respiratory infection Yes    Wheezing        Plan:  Assessment & Plan  Lower respiratory infection       Orders:    amoxicillin-clavulanate (AUGMENTIN) 875-125 MG per tablet; Take 1 tablet by mouth 2 times daily for 10 days    Wheezing       Orders:    methylPREDNISolone (MEDROL, JENNIFER,) 4 MG tablet; Take by mouth. Take 6 tabs on day 1, 5 tabs on day 2, 4 tabs on day 3, 3 tabs on day 4, 2 tabs on day 5, 1 tab on day 6           No follow-ups on file.

## 2025-06-30 ENCOUNTER — TELEPHONE (OUTPATIENT)
Dept: FAMILY MEDICINE CLINIC | Age: 65
End: 2025-06-30

## 2025-06-30 NOTE — TELEPHONE ENCOUNTER
Patient came into the office, letter was printed and paperwork placed in PCP folder for signature, please contact patient [phone] when complete.

## 2025-06-30 NOTE — TELEPHONE ENCOUNTER
Pt called to ask for a letter of accomodation stating that he and his wife need a 2 bedroom apartment. Pt stated his wife, Susie, keeps him up at night due to being restless. Also, the letter needs to state that they both need to keep their 2 dogs, one for him and one for her.     Please call pt is any questions or concerns. Pt stated to call him when letter is ready.

## 2025-07-02 NOTE — TELEPHONE ENCOUNTER
Pt came to  papers. He now is stating that he needs a letters for hm and his wife stating that they both need there service dogs. Needs letter each for dog. Pt is in lobby     Advise

## 2025-07-06 DIAGNOSIS — G62.9 NEUROPATHY: ICD-10-CM

## 2025-07-06 DIAGNOSIS — G25.81 RESTLESS LEG SYNDROME: ICD-10-CM

## 2025-07-07 RX ORDER — PREGABALIN 100 MG/1
100 CAPSULE ORAL 3 TIMES DAILY
Qty: 270 CAPSULE | Refills: 0 | Status: SHIPPED | OUTPATIENT
Start: 2025-07-07 | End: 2025-10-05

## 2025-07-12 DIAGNOSIS — K90.89 BILE SALT-INDUCED DIARRHEA: ICD-10-CM

## 2025-07-13 RX ORDER — COLESTIPOL HYDROCHLORIDE 1 G/1
1 TABLET ORAL 2 TIMES DAILY
Qty: 180 TABLET | Refills: 0 | Status: SHIPPED | OUTPATIENT
Start: 2025-07-13

## 2025-08-01 RX ORDER — ROPINIROLE 2 MG/1
2 TABLET, FILM COATED ORAL 3 TIMES DAILY
Qty: 270 TABLET | Refills: 1 | Status: SHIPPED | OUTPATIENT
Start: 2025-08-01

## 2025-08-01 NOTE — TELEPHONE ENCOUNTER
Last Office Visit  -  05/19/2025  Next Office Visit  -  n/a    Last Filled  -    Last UDS -    Contract -

## 2025-08-04 ENCOUNTER — APPOINTMENT (OUTPATIENT)
Dept: GENERAL RADIOLOGY | Age: 65
End: 2025-08-04
Payer: MEDICARE

## 2025-08-04 ENCOUNTER — HOSPITAL ENCOUNTER (EMERGENCY)
Age: 65
Discharge: HOME | End: 2025-08-04
Payer: MEDICARE

## 2025-08-04 VITALS
TEMPERATURE: 97.7 F | HEART RATE: 72 BPM | SYSTOLIC BLOOD PRESSURE: 112 MMHG | OXYGEN SATURATION: 97 % | DIASTOLIC BLOOD PRESSURE: 67 MMHG | RESPIRATION RATE: 18 BRPM

## 2025-08-04 DIAGNOSIS — S80.02XA CONTUSION OF LEFT KNEE, INITIAL ENCOUNTER: Primary | ICD-10-CM

## 2025-08-04 PROCEDURE — 73562 X-RAY EXAM OF KNEE 3: CPT

## 2025-08-04 PROCEDURE — 6370000000 HC RX 637 (ALT 250 FOR IP): Performed by: NURSE PRACTITIONER

## 2025-08-04 PROCEDURE — 99283 EMERGENCY DEPT VISIT LOW MDM: CPT

## 2025-08-04 RX ORDER — ACETAMINOPHEN 325 MG/1
650 TABLET ORAL ONCE
Status: COMPLETED | OUTPATIENT
Start: 2025-08-04 | End: 2025-08-04

## 2025-08-04 RX ADMIN — ACETAMINOPHEN 650 MG: 325 TABLET ORAL at 13:25

## 2025-08-04 ASSESSMENT — PAIN - FUNCTIONAL ASSESSMENT: PAIN_FUNCTIONAL_ASSESSMENT: 0-10

## 2025-08-04 ASSESSMENT — PAIN SCALES - GENERAL: PAINLEVEL_OUTOF10: 7

## (undated) DEVICE — TROCAR ENDOSCP L100MM DIA5MM BLDELSS STBL SL OBT RADLUC

## (undated) DEVICE — Device

## (undated) DEVICE — LAPAROSCOPIC CHOLANGIOGRAM CATHETER: Brand: AMERICAN CATHETER CORP

## (undated) DEVICE — GLOVE,SURG,SENSICARE SLT,LF,PF,7: Brand: MEDLINE

## (undated) DEVICE — SOLUTION IV 1000ML LAC RINGERS PH 6.5 INJ USP VIAFLX PLAS

## (undated) DEVICE — GAUZE,SPONGE,2"X2",8PLY,STERILE,LF,2'S: Brand: MEDLINE

## (undated) DEVICE — PUMP SUC IRR TBNG L10FT W/ HNDPC ASSEMB STRYKEFLOW 2